# Patient Record
Sex: FEMALE | Race: WHITE
[De-identification: names, ages, dates, MRNs, and addresses within clinical notes are randomized per-mention and may not be internally consistent; named-entity substitution may affect disease eponyms.]

---

## 2017-01-25 ENCOUNTER — APPOINTMENT (OUTPATIENT)
Dept: COLORECTAL SURGERY | Facility: CLINIC | Age: 52
End: 2017-01-25

## 2017-01-25 VITALS
BODY MASS INDEX: 28.89 KG/M2 | TEMPERATURE: 97.9 F | SYSTOLIC BLOOD PRESSURE: 118 MMHG | HEART RATE: 86 BPM | WEIGHT: 157 LBS | DIASTOLIC BLOOD PRESSURE: 80 MMHG | HEIGHT: 62 IN

## 2017-01-25 DIAGNOSIS — R15.9 FULL INCONTINENCE OF FECES: ICD-10-CM

## 2017-01-25 DIAGNOSIS — Z71.9 COUNSELING, UNSPECIFIED: ICD-10-CM

## 2017-01-26 ENCOUNTER — OTHER (OUTPATIENT)
Age: 52
End: 2017-01-26

## 2017-01-26 PROBLEM — R15.9 FECAL INCONTINENCE: Status: ACTIVE | Noted: 2017-01-26

## 2017-01-26 PROBLEM — Z71.9 REASON FOR CONSULTATION: Status: ACTIVE | Noted: 2017-01-25

## 2017-02-01 ENCOUNTER — OUTPATIENT (OUTPATIENT)
Dept: OUTPATIENT SERVICES | Facility: HOSPITAL | Age: 52
LOS: 1 days | Discharge: HOME | End: 2017-02-01
Payer: COMMERCIAL

## 2017-02-01 ENCOUNTER — OTHER (OUTPATIENT)
Age: 52
End: 2017-02-01

## 2017-02-01 VITALS
SYSTOLIC BLOOD PRESSURE: 110 MMHG | RESPIRATION RATE: 18 BRPM | HEART RATE: 88 BPM | HEIGHT: 62 IN | WEIGHT: 197.98 LBS | OXYGEN SATURATION: 100 % | TEMPERATURE: 98 F | DIASTOLIC BLOOD PRESSURE: 72 MMHG

## 2017-02-01 DIAGNOSIS — Q78.0 OSTEOGENESIS IMPERFECTA: Chronic | ICD-10-CM

## 2017-02-01 DIAGNOSIS — S96.812A STRAIN OF OTHER SPECIFIED MUSCLES AND TENDONS AT ANKLE AND FOOT LEVEL, LEFT FOOT, INITIAL ENCOUNTER: Chronic | ICD-10-CM

## 2017-02-01 DIAGNOSIS — R15.9 FULL INCONTINENCE OF FECES: ICD-10-CM

## 2017-02-01 DIAGNOSIS — Z98.890 OTHER SPECIFIED POSTPROCEDURAL STATES: Chronic | ICD-10-CM

## 2017-02-01 LAB
ANION GAP SERPL CALC-SCNC: 8 MMOL/L — SIGNIFICANT CHANGE UP (ref 5–17)
APTT BLD: 33.1 SEC — SIGNIFICANT CHANGE UP (ref 27.5–37.4)
BASOPHILS # BLD AUTO: 0.1 K/UL — SIGNIFICANT CHANGE UP (ref 0–0.2)
BASOPHILS NFR BLD AUTO: 1.3 % — SIGNIFICANT CHANGE UP (ref 0–2)
BUN SERPL-MCNC: 9 MG/DL — SIGNIFICANT CHANGE UP (ref 7–23)
CALCIUM SERPL-MCNC: 9.3 MG/DL — SIGNIFICANT CHANGE UP (ref 8.5–10.1)
CHLORIDE SERPL-SCNC: 103 MMOL/L — SIGNIFICANT CHANGE UP (ref 96–108)
CO2 SERPL-SCNC: 29 MMOL/L — SIGNIFICANT CHANGE UP (ref 22–31)
CREAT SERPL-MCNC: 0.87 MG/DL — SIGNIFICANT CHANGE UP (ref 0.5–1.3)
EOSINOPHIL # BLD AUTO: 0.1 K/UL — SIGNIFICANT CHANGE UP (ref 0–0.5)
EOSINOPHIL NFR BLD AUTO: 2.1 % — SIGNIFICANT CHANGE UP (ref 0–6)
GLUCOSE SERPL-MCNC: 105 MG/DL — HIGH (ref 70–99)
HCT VFR BLD CALC: 41.6 % — SIGNIFICANT CHANGE UP (ref 34.5–45)
HGB BLD-MCNC: 13.9 G/DL — SIGNIFICANT CHANGE UP (ref 11.5–15.5)
INR BLD: 1.04 RATIO — SIGNIFICANT CHANGE UP (ref 0.88–1.16)
LYMPHOCYTES # BLD AUTO: 3.1 K/UL — SIGNIFICANT CHANGE UP (ref 1–3.3)
LYMPHOCYTES # BLD AUTO: 51.1 % — HIGH (ref 13–44)
MCHC RBC-ENTMCNC: 29 PG — SIGNIFICANT CHANGE UP (ref 27–34)
MCHC RBC-ENTMCNC: 33.3 GM/DL — SIGNIFICANT CHANGE UP (ref 32–36)
MCV RBC AUTO: 87.3 FL — SIGNIFICANT CHANGE UP (ref 80–100)
MONOCYTES # BLD AUTO: 0.4 K/UL — SIGNIFICANT CHANGE UP (ref 0–0.9)
MONOCYTES NFR BLD AUTO: 6.5 % — SIGNIFICANT CHANGE UP (ref 2–14)
NEUTROPHILS # BLD AUTO: 2.4 K/UL — SIGNIFICANT CHANGE UP (ref 1.8–7.4)
NEUTROPHILS NFR BLD AUTO: 39 % — LOW (ref 43–77)
PLATELET # BLD AUTO: 223 K/UL — SIGNIFICANT CHANGE UP (ref 150–400)
POTASSIUM SERPL-MCNC: 3.2 MMOL/L — LOW (ref 3.5–5.3)
POTASSIUM SERPL-SCNC: 3.2 MMOL/L — LOW (ref 3.5–5.3)
PROTHROM AB SERPL-ACNC: 11.5 SEC — SIGNIFICANT CHANGE UP (ref 10–13.1)
RBC # BLD: 4.77 M/UL — SIGNIFICANT CHANGE UP (ref 3.8–5.2)
RBC # FLD: 11.3 % — SIGNIFICANT CHANGE UP (ref 10.3–14.5)
SODIUM SERPL-SCNC: 140 MMOL/L — SIGNIFICANT CHANGE UP (ref 135–145)
WBC # BLD: 6 K/UL — SIGNIFICANT CHANGE UP (ref 3.8–10.5)
WBC # FLD AUTO: 6 K/UL — SIGNIFICANT CHANGE UP (ref 3.8–10.5)

## 2017-02-01 PROCEDURE — 93010 ELECTROCARDIOGRAM REPORT: CPT

## 2017-02-01 NOTE — H&P PST ADULT - HISTORY OF PRESENT ILLNESS
51 years old female with  "damaged" sacral nerves. Patient had difficulty with bowel movements. She had a sacral nerve stimulator placed January , 2015. She admitted to improvement in bowel movements "for a couple of months". "And then it stopped working. Last bowel movement 1/20/ 2017. Admits to lower abdominal cramping. Fecal matter "in my vagina" at present.

## 2017-02-01 NOTE — H&P PST ADULT - PMH
Acid reflux    Ahn esophagus    Depression    Endometriosis  Hysterectomy  Gall bladder stones  History of. Gall bladder removed  Hemorrhoids, unspecified hemorrhoid type    Hepatic adenoma  History of. 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

## 2017-02-01 NOTE — H&P PST ADULT - ASSESSMENT
51 years old female present to PST prior to removal of sacral nerve stimulator. medical clearance Dr. Rodriguez. Cardiac clearance Dr. Kim.

## 2017-02-01 NOTE — H&P PST ADULT - FAMILY HISTORY
Father  Still living? No  Family history of heart attack, Age at diagnosis: Age Unknown  Family history of hypertension, Age at diagnosis: Age Unknown     Mother  Still living? No  Family history of hypertension, Age at diagnosis: Age Unknown  Family history of uterine cancer, Age at diagnosis: Age Unknown

## 2017-02-01 NOTE — H&P PST ADULT - PSH
Aplasia ossea microplastica    Hepatic adenoma  resected 2006  S/P appendectomy  age 14  S/P breast augmentation  reduction 2003  S/P exploratory laparotomy  12/12 cholecystectomy, ROZINA, endometriosis  S/P hysterectomy with oophorectomy  with mesh for stress incontinence 3/12  S/P LASIK surgery of both eyes  2000  S/P Nissen fundoplication (with gastrostomy tube placement)  for barrets esophagus 2006  S/P tonsillectomy  age 14  Stress incontinence, female  right side of mesh "cut" adjusted 9/12  Tendon rupture, post-op  repaired right elbow 2011  Traumatic rupture of plantar fascia of left foot, initial encounter  surgery

## 2017-02-01 NOTE — H&P PST ADULT - NEGATIVE FEMALE-SPECIFIC SYMPTOMS
no menorrhagia/no abnormal vaginal bleeding/no spotting/no amenorrhea/no dysmenorrhea/no pelvic pain/no irregular menses

## 2017-02-01 NOTE — H&P PST ADULT - TEACHING/LEARNING LEARNING PREFERENCES
video/skill demonstration/written material/computer/internet/individual instruction/verbal instruction/pictorial/group instruction/audio

## 2017-02-06 ENCOUNTER — APPOINTMENT (OUTPATIENT)
Dept: COLORECTAL SURGERY | Facility: HOSPITAL | Age: 52
End: 2017-02-06

## 2017-02-06 ENCOUNTER — OUTPATIENT (OUTPATIENT)
Dept: OUTPATIENT SERVICES | Facility: HOSPITAL | Age: 52
LOS: 1 days | Discharge: HOME | End: 2017-02-06
Payer: COMMERCIAL

## 2017-02-06 VITALS
HEART RATE: 83 BPM | TEMPERATURE: 98 F | SYSTOLIC BLOOD PRESSURE: 101 MMHG | RESPIRATION RATE: 16 BRPM | OXYGEN SATURATION: 95 % | DIASTOLIC BLOOD PRESSURE: 61 MMHG

## 2017-02-06 VITALS
HEART RATE: 84 BPM | SYSTOLIC BLOOD PRESSURE: 113 MMHG | TEMPERATURE: 98 F | DIASTOLIC BLOOD PRESSURE: 56 MMHG | OXYGEN SATURATION: 97 % | RESPIRATION RATE: 16 BRPM | WEIGHT: 198.42 LBS | HEIGHT: 62 IN

## 2017-02-06 DIAGNOSIS — Z98.890 OTHER SPECIFIED POSTPROCEDURAL STATES: Chronic | ICD-10-CM

## 2017-02-06 DIAGNOSIS — S96.812A STRAIN OF OTHER SPECIFIED MUSCLES AND TENDONS AT ANKLE AND FOOT LEVEL, LEFT FOOT, INITIAL ENCOUNTER: Chronic | ICD-10-CM

## 2017-02-06 DIAGNOSIS — Q78.0 OSTEOGENESIS IMPERFECTA: Chronic | ICD-10-CM

## 2017-02-06 PROCEDURE — 64585 REV/RMV PERPH NSTIM ELTRD RA: CPT

## 2017-02-06 PROCEDURE — 64595 REV/RMV PRPH SAC/GSTR NPG/R: CPT

## 2017-02-06 RX ORDER — FAMOTIDINE 10 MG/ML
20 INJECTION INTRAVENOUS ONCE
Qty: 0 | Refills: 0 | Status: COMPLETED | OUTPATIENT
Start: 2017-02-06 | End: 2017-02-06

## 2017-02-06 RX ORDER — SODIUM CHLORIDE 9 MG/ML
3 INJECTION INTRAMUSCULAR; INTRAVENOUS; SUBCUTANEOUS EVERY 8 HOURS
Qty: 0 | Refills: 0 | Status: DISCONTINUED | OUTPATIENT
Start: 2017-02-06 | End: 2017-02-06

## 2017-02-06 RX ORDER — OXYCODONE HYDROCHLORIDE 5 MG/1
10 TABLET ORAL ONCE
Qty: 0 | Refills: 0 | Status: DISCONTINUED | OUTPATIENT
Start: 2017-02-06 | End: 2017-02-06

## 2017-02-06 RX ORDER — FUROSEMIDE 40 MG
1 TABLET ORAL
Qty: 0 | Refills: 0 | COMMUNITY

## 2017-02-06 RX ORDER — CELECOXIB 200 MG/1
200 CAPSULE ORAL ONCE
Qty: 0 | Refills: 0 | Status: COMPLETED | OUTPATIENT
Start: 2017-02-06 | End: 2017-02-06

## 2017-02-06 RX ORDER — SODIUM CHLORIDE 9 MG/ML
1000 INJECTION, SOLUTION INTRAVENOUS
Qty: 0 | Refills: 0 | Status: DISCONTINUED | OUTPATIENT
Start: 2017-02-06 | End: 2017-02-21

## 2017-02-06 RX ORDER — FENTANYL CITRATE 50 UG/ML
50 INJECTION INTRAVENOUS
Qty: 0 | Refills: 0 | Status: DISCONTINUED | OUTPATIENT
Start: 2017-02-06 | End: 2017-02-06

## 2017-02-06 RX ORDER — SODIUM CHLORIDE 9 MG/ML
1000 INJECTION, SOLUTION INTRAVENOUS
Qty: 0 | Refills: 0 | Status: DISCONTINUED | OUTPATIENT
Start: 2017-02-06 | End: 2017-02-06

## 2017-02-06 RX ORDER — ONDANSETRON 8 MG/1
4 TABLET, FILM COATED ORAL ONCE
Qty: 0 | Refills: 0 | Status: DISCONTINUED | OUTPATIENT
Start: 2017-02-06 | End: 2017-02-06

## 2017-02-06 RX ORDER — ACETAMINOPHEN 500 MG
975 TABLET ORAL ONCE
Qty: 0 | Refills: 0 | Status: COMPLETED | OUTPATIENT
Start: 2017-02-06 | End: 2017-02-06

## 2017-02-06 RX ADMIN — SODIUM CHLORIDE 75 MILLILITER(S): 9 INJECTION, SOLUTION INTRAVENOUS at 10:00

## 2017-02-06 RX ADMIN — Medication 975 MILLIGRAM(S): at 07:16

## 2017-02-06 RX ADMIN — FAMOTIDINE 20 MILLIGRAM(S): 10 INJECTION INTRAVENOUS at 07:16

## 2017-02-06 RX ADMIN — OXYCODONE HYDROCHLORIDE 10 MILLIGRAM(S): 5 TABLET ORAL at 07:16

## 2017-02-06 NOTE — ASU DISCHARGE PLAN (ADULT/PEDIATRIC). - NURSING INSTRUCTIONS
For any problems or concerns,contact your doctor. [facility] patients should call the [facility]. If you cannot reach the doctor or clinic, call [facility] Emergency Department at [phone] or go to your local Emergency Department.  A responsible adult should be with you for the rest of the day and night for your safety and to help you if you needed. Resume your medications as listed on the attached Medication Record.

## 2017-02-06 NOTE — ASU DISCHARGE PLAN (ADULT/PEDIATRIC). - MEDICATION SUMMARY - MEDICATIONS TO TAKE
I will START or STAY ON the medications listed below when I get home from the hospital:    oxyCODONE 10 mg oral tablet  -- 10 milligram(s) by mouth 3 times a day  -- Indication: For home med    Valium 5 mg oral tablet  -- 5 milligram(s) by mouth 2 times a day  -- Indication: For home med    Sarafem 20 mg oral capsule  -- 1 cap(s) by mouth once a day (in the morning)  -- Indication: For home med    Elavil 10 mg oral tablet  -- 2 tab(s) by mouth once a day (at bedtime)  -- Indication: For home med    Bystolic 10 mg oral tablet  -- 1 tab(s) by mouth once a day (in the morning)  -- Indication: For home med    Movantik 25 mg oral tablet  -- 1 tab(s) by mouth once a day (in the morning)  -- Indication: For home med    Vitamin D2 50,000 intl units (1.25 mg) oral capsule  -- 1 cap(s) by mouth once a week  -- taken on Sundays  -- Indication: For home med

## 2017-02-08 DIAGNOSIS — Z46.2 ENCOUNTER FOR FITTING AND ADJUSTMENT OF OTHER DEVICES RELATED TO NERVOUS SYSTEM AND SPECIAL SENSES: ICD-10-CM

## 2017-02-08 DIAGNOSIS — R15.9 FULL INCONTINENCE OF FECES: ICD-10-CM

## 2017-02-08 DIAGNOSIS — Z88.2 ALLERGY STATUS TO SULFONAMIDES: ICD-10-CM

## 2017-02-08 DIAGNOSIS — E78.5 HYPERLIPIDEMIA, UNSPECIFIED: ICD-10-CM

## 2017-02-08 DIAGNOSIS — K57.30 DIVERTICULOSIS OF LARGE INTESTINE WITHOUT PERFORATION OR ABSCESS WITHOUT BLEEDING: ICD-10-CM

## 2017-02-08 DIAGNOSIS — K21.9 GASTRO-ESOPHAGEAL REFLUX DISEASE WITHOUT ESOPHAGITIS: ICD-10-CM

## 2017-02-08 DIAGNOSIS — K58.9 IRRITABLE BOWEL SYNDROME WITHOUT DIARRHEA: ICD-10-CM

## 2017-02-08 DIAGNOSIS — I10 ESSENTIAL (PRIMARY) HYPERTENSION: ICD-10-CM

## 2017-02-14 ENCOUNTER — APPOINTMENT (OUTPATIENT)
Dept: COLORECTAL SURGERY | Facility: CLINIC | Age: 52
End: 2017-02-14

## 2017-02-17 ENCOUNTER — APPOINTMENT (OUTPATIENT)
Dept: COLORECTAL SURGERY | Facility: CLINIC | Age: 52
End: 2017-02-17

## 2017-02-17 VITALS
SYSTOLIC BLOOD PRESSURE: 136 MMHG | HEIGHT: 62 IN | WEIGHT: 157 LBS | DIASTOLIC BLOOD PRESSURE: 90 MMHG | HEART RATE: 82 BPM | TEMPERATURE: 98.1 F | BODY MASS INDEX: 28.89 KG/M2

## 2017-02-17 RX ORDER — ERGOCALCIFEROL 1.25 MG/1
1.25 MG CAPSULE, LIQUID FILLED ORAL
Qty: 4 | Refills: 0 | Status: ACTIVE | COMMUNITY
Start: 2016-10-18

## 2017-02-20 ENCOUNTER — OTHER (OUTPATIENT)
Age: 52
End: 2017-02-20

## 2017-02-21 ENCOUNTER — OTHER (OUTPATIENT)
Age: 52
End: 2017-02-21

## 2017-03-07 ENCOUNTER — FORM ENCOUNTER (OUTPATIENT)
Age: 52
End: 2017-03-07

## 2017-03-08 ENCOUNTER — APPOINTMENT (OUTPATIENT)
Dept: MRI IMAGING | Facility: CLINIC | Age: 52
End: 2017-03-08

## 2017-03-08 ENCOUNTER — OUTPATIENT (OUTPATIENT)
Dept: OUTPATIENT SERVICES | Facility: HOSPITAL | Age: 52
LOS: 1 days | End: 2017-03-08
Payer: COMMERCIAL

## 2017-03-08 DIAGNOSIS — Q78.0 OSTEOGENESIS IMPERFECTA: Chronic | ICD-10-CM

## 2017-03-08 DIAGNOSIS — Z00.8 ENCOUNTER FOR OTHER GENERAL EXAMINATION: ICD-10-CM

## 2017-03-08 DIAGNOSIS — S96.812A STRAIN OF OTHER SPECIFIED MUSCLES AND TENDONS AT ANKLE AND FOOT LEVEL, LEFT FOOT, INITIAL ENCOUNTER: Chronic | ICD-10-CM

## 2017-03-08 DIAGNOSIS — Z98.890 OTHER SPECIFIED POSTPROCEDURAL STATES: Chronic | ICD-10-CM

## 2017-03-08 PROCEDURE — 82565 ASSAY OF CREATININE: CPT

## 2017-03-08 PROCEDURE — A9585: CPT

## 2017-03-08 PROCEDURE — 72197 MRI PELVIS W/O & W/DYE: CPT

## 2017-03-21 ENCOUNTER — APPOINTMENT (OUTPATIENT)
Dept: COLORECTAL SURGERY | Facility: CLINIC | Age: 52
End: 2017-03-21

## 2017-03-22 VITALS
BODY MASS INDEX: 28.89 KG/M2 | RESPIRATION RATE: 14 BRPM | WEIGHT: 157 LBS | DIASTOLIC BLOOD PRESSURE: 86 MMHG | HEIGHT: 62 IN | HEART RATE: 78 BPM | TEMPERATURE: 98 F | SYSTOLIC BLOOD PRESSURE: 128 MMHG

## 2017-03-30 ENCOUNTER — APPOINTMENT (OUTPATIENT)
Dept: UROLOGY | Facility: CLINIC | Age: 52
End: 2017-03-30

## 2017-03-30 DIAGNOSIS — N39.3 STRESS INCONTINENCE (FEMALE) (MALE): ICD-10-CM

## 2017-03-31 ENCOUNTER — APPOINTMENT (OUTPATIENT)
Dept: COLORECTAL SURGERY | Facility: CLINIC | Age: 52
End: 2017-03-31

## 2017-03-31 VITALS
BODY MASS INDEX: 28.89 KG/M2 | TEMPERATURE: 98.1 F | WEIGHT: 157 LBS | SYSTOLIC BLOOD PRESSURE: 129 MMHG | DIASTOLIC BLOOD PRESSURE: 82 MMHG | HEIGHT: 62 IN | HEART RATE: 69 BPM

## 2017-03-31 DIAGNOSIS — N32.1 VESICOINTESTINAL FISTULA: ICD-10-CM

## 2017-04-11 PROBLEM — N32.1 COLOVESICAL FISTULA: Status: ACTIVE | Noted: 2017-02-17

## 2017-04-14 ENCOUNTER — OUTPATIENT (OUTPATIENT)
Dept: OUTPATIENT SERVICES | Facility: HOSPITAL | Age: 52
LOS: 1 days | Discharge: HOME | End: 2017-04-14
Payer: COMMERCIAL

## 2017-04-14 VITALS
OXYGEN SATURATION: 97 % | RESPIRATION RATE: 14 BRPM | DIASTOLIC BLOOD PRESSURE: 68 MMHG | HEIGHT: 62.5 IN | HEART RATE: 70 BPM | TEMPERATURE: 98 F | SYSTOLIC BLOOD PRESSURE: 111 MMHG | WEIGHT: 194.01 LBS

## 2017-04-14 DIAGNOSIS — Z98.890 OTHER SPECIFIED POSTPROCEDURAL STATES: Chronic | ICD-10-CM

## 2017-04-14 DIAGNOSIS — N73.6 FEMALE PELVIC PERITONEAL ADHESIONS (POSTINFECTIVE): ICD-10-CM

## 2017-04-14 DIAGNOSIS — S96.812A STRAIN OF OTHER SPECIFIED MUSCLES AND TENDONS AT ANKLE AND FOOT LEVEL, LEFT FOOT, INITIAL ENCOUNTER: Chronic | ICD-10-CM

## 2017-04-14 DIAGNOSIS — N82.4 OTHER FEMALE INTESTINAL-GENITAL TRACT FISTULAE: ICD-10-CM

## 2017-04-14 DIAGNOSIS — N32.1 VESICOINTESTINAL FISTULA: ICD-10-CM

## 2017-04-14 DIAGNOSIS — Q78.0 OSTEOGENESIS IMPERFECTA: Chronic | ICD-10-CM

## 2017-04-14 LAB
ALBUMIN SERPL ELPH-MCNC: 4.3 G/DL — SIGNIFICANT CHANGE UP (ref 3.3–5)
ALP SERPL-CCNC: 107 U/L — SIGNIFICANT CHANGE UP (ref 40–120)
ALT FLD-CCNC: 36 U/L — SIGNIFICANT CHANGE UP (ref 12–78)
ANION GAP SERPL CALC-SCNC: 6 MMOL/L — SIGNIFICANT CHANGE UP (ref 5–17)
APPEARANCE UR: CLEAR — SIGNIFICANT CHANGE UP
APTT BLD: 34.2 SEC — SIGNIFICANT CHANGE UP (ref 27.5–37.4)
AST SERPL-CCNC: 24 U/L — SIGNIFICANT CHANGE UP (ref 15–37)
BASOPHILS # BLD AUTO: 0.1 K/UL — SIGNIFICANT CHANGE UP (ref 0–0.2)
BASOPHILS NFR BLD AUTO: 1.6 % — SIGNIFICANT CHANGE UP (ref 0–2)
BILIRUB DIRECT SERPL-MCNC: <0.1 MG/DL — SIGNIFICANT CHANGE UP (ref 0–0.2)
BILIRUB SERPL-MCNC: 0.5 MG/DL — SIGNIFICANT CHANGE UP (ref 0.2–1.2)
BILIRUB UR-MCNC: NEGATIVE — SIGNIFICANT CHANGE UP
BUN SERPL-MCNC: 16 MG/DL — SIGNIFICANT CHANGE UP (ref 7–23)
CALCIUM SERPL-MCNC: 9.1 MG/DL — SIGNIFICANT CHANGE UP (ref 8.5–10.1)
CHLORIDE SERPL-SCNC: 105 MMOL/L — SIGNIFICANT CHANGE UP (ref 96–108)
CO2 SERPL-SCNC: 30 MMOL/L — SIGNIFICANT CHANGE UP (ref 22–31)
COLOR SPEC: YELLOW — SIGNIFICANT CHANGE UP
CREAT SERPL-MCNC: 0.96 MG/DL — SIGNIFICANT CHANGE UP (ref 0.5–1.3)
DIFF PNL FLD: (no result)
EOSINOPHIL # BLD AUTO: 0.1 K/UL — SIGNIFICANT CHANGE UP (ref 0–0.5)
EOSINOPHIL NFR BLD AUTO: 1.6 % — SIGNIFICANT CHANGE UP (ref 0–6)
GLUCOSE SERPL-MCNC: 103 MG/DL — HIGH (ref 70–99)
GLUCOSE UR QL: NEGATIVE MG/DL — SIGNIFICANT CHANGE UP
HBA1C BLD-MCNC: 5.6 % — SIGNIFICANT CHANGE UP (ref 4–5.6)
HCT VFR BLD CALC: 43 % — SIGNIFICANT CHANGE UP (ref 34.5–45)
HGB BLD-MCNC: 14.9 G/DL — SIGNIFICANT CHANGE UP (ref 11.5–15.5)
INR BLD: 1.02 RATIO — SIGNIFICANT CHANGE UP (ref 0.88–1.16)
KETONES UR-MCNC: (no result)
LEUKOCYTE ESTERASE UR-ACNC: (no result)
LYMPHOCYTES # BLD AUTO: 2 K/UL — SIGNIFICANT CHANGE UP (ref 1–3.3)
LYMPHOCYTES # BLD AUTO: 33.1 % — SIGNIFICANT CHANGE UP (ref 13–44)
MCHC RBC-ENTMCNC: 30.8 PG — SIGNIFICANT CHANGE UP (ref 27–34)
MCHC RBC-ENTMCNC: 34.6 GM/DL — SIGNIFICANT CHANGE UP (ref 32–36)
MCV RBC AUTO: 88.9 FL — SIGNIFICANT CHANGE UP (ref 80–100)
MONOCYTES # BLD AUTO: 0.4 K/UL — SIGNIFICANT CHANGE UP (ref 0–0.9)
MONOCYTES NFR BLD AUTO: 7.3 % — SIGNIFICANT CHANGE UP (ref 2–14)
MRSA PCR RESULT.: SIGNIFICANT CHANGE UP
NEUTROPHILS # BLD AUTO: 3.4 K/UL — SIGNIFICANT CHANGE UP (ref 1.8–7.4)
NEUTROPHILS NFR BLD AUTO: 56.5 % — SIGNIFICANT CHANGE UP (ref 43–77)
NITRITE UR-MCNC: NEGATIVE — SIGNIFICANT CHANGE UP
PH UR: 5 — SIGNIFICANT CHANGE UP (ref 4.8–8)
PLATELET # BLD AUTO: 196 K/UL — SIGNIFICANT CHANGE UP (ref 150–400)
POTASSIUM SERPL-MCNC: 4.5 MMOL/L — SIGNIFICANT CHANGE UP (ref 3.5–5.3)
POTASSIUM SERPL-SCNC: 4.5 MMOL/L — SIGNIFICANT CHANGE UP (ref 3.5–5.3)
PROT SERPL-MCNC: 7.6 GM/DL — SIGNIFICANT CHANGE UP (ref 6–8.3)
PROT UR-MCNC: 15 MG/DL
PROTHROM AB SERPL-ACNC: 11 SEC — SIGNIFICANT CHANGE UP (ref 9.8–12.7)
RBC # BLD: 4.84 M/UL — SIGNIFICANT CHANGE UP (ref 3.8–5.2)
RBC # FLD: 12.3 % — SIGNIFICANT CHANGE UP (ref 10.3–14.5)
S AUREUS DNA NOSE QL NAA+PROBE: SIGNIFICANT CHANGE UP
SODIUM SERPL-SCNC: 141 MMOL/L — SIGNIFICANT CHANGE UP (ref 135–145)
SP GR SPEC: 1.02 — SIGNIFICANT CHANGE UP (ref 1.01–1.02)
TYPE + AB SCN PNL BLD: SIGNIFICANT CHANGE UP
UROBILINOGEN FLD QL: NEGATIVE MG/DL — SIGNIFICANT CHANGE UP
WBC # BLD: 6 K/UL — SIGNIFICANT CHANGE UP (ref 3.8–10.5)
WBC # FLD AUTO: 6 K/UL — SIGNIFICANT CHANGE UP (ref 3.8–10.5)

## 2017-04-14 PROCEDURE — 93010 ELECTROCARDIOGRAM REPORT: CPT

## 2017-04-14 RX ORDER — DIAZEPAM 5 MG
5 TABLET ORAL
Qty: 0 | Refills: 0 | COMMUNITY

## 2017-04-14 NOTE — H&P PST ADULT - PMH
Acid reflux    Ahn esophagus    Chronic UTI    Constipation    Endometriosis  Hysterectomy  Hemorrhoids, unspecified hemorrhoid type    Hepatic adenoma  History of. 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

## 2017-04-14 NOTE — H&P PST ADULT - HISTORY OF PRESENT ILLNESS
50yo female presents to PST prior to proposed procedure. Reports multiple  & rectal procedures with mesh. c/o stool & air "coming out of the vagina for at least 6 months. Reports chronic UTIs. Was referred to Dr Loomis by family member. Reports having MRI & it showed "diverticulum of the urethrum". Reports blood in stool. Denies fevers.   Now for said procedure.

## 2017-04-14 NOTE — H&P PST ADULT - GENITOURINARY COMMENTS
Followed by urologist for chronic UTI managed with oral antibiotics. Followed by pain management for pudendal nerve pain & pelvic floor dysfunction.

## 2017-04-14 NOTE — H&P PST ADULT - PSH
Aplasia ossea microplastica    Hepatic adenoma  resected 2006  History of rectal surgery  interstimulator for rectal incontinence  later removed 2016  S/P appendectomy  1979  S/P breast augmentation  reduction 2003  S/P colonoscopy  multiple  S/P exploratory laparotomy  cholecystectomy, ROZINA, endometriosis  S/P hysterectomy with oophorectomy  with mesh for stress incontinence  S/P LASIK surgery of both eyes  2000  S/P Nissen fundoplication (with gastrostomy tube placement)  for barrets esophagus 2006  S/P tonsillectomy  1979  Stress incontinence, female  right side of mesh "cut" adjusted  Tendon rupture, post-op  repaired right elbow 2011  Traumatic rupture of plantar fascia of left foot, initial encounter  surgery

## 2017-04-14 NOTE — H&P PST ADULT - ASSESSMENT
52 yo female scheduled for rectovaginal fistula, colon resection & related procedure with Dr Loomis on 4/24/17.     1. Labs as per surgeon  2. EKG  3. Medical clearance with PCP Dr Madhuri Rodriguez  4. discussed EZ sponges & day of procedure instructions

## 2017-04-18 RX ORDER — ALVIMOPAN 12 MG/1
12 CAPSULE ORAL ONCE
Qty: 0 | Refills: 0 | Status: COMPLETED | OUTPATIENT
Start: 2017-04-24 | End: 2017-04-24

## 2017-04-18 RX ORDER — SODIUM CHLORIDE 9 MG/ML
3 INJECTION INTRAMUSCULAR; INTRAVENOUS; SUBCUTANEOUS EVERY 8 HOURS
Qty: 0 | Refills: 0 | Status: DISCONTINUED | OUTPATIENT
Start: 2017-04-24 | End: 2017-04-25

## 2017-04-21 ENCOUNTER — OTHER (OUTPATIENT)
Age: 52
End: 2017-04-21

## 2017-04-21 RX ORDER — FAMOTIDINE 10 MG/ML
20 INJECTION INTRAVENOUS ONCE
Qty: 0 | Refills: 0 | Status: COMPLETED | OUTPATIENT
Start: 2017-04-24 | End: 2017-04-24

## 2017-04-21 RX ORDER — SODIUM CHLORIDE 9 MG/ML
3 INJECTION INTRAMUSCULAR; INTRAVENOUS; SUBCUTANEOUS EVERY 8 HOURS
Qty: 0 | Refills: 0 | Status: DISCONTINUED | OUTPATIENT
Start: 2017-04-24 | End: 2017-04-25

## 2017-04-21 RX ORDER — OXYCODONE HYDROCHLORIDE 5 MG/1
10 TABLET ORAL ONCE
Qty: 0 | Refills: 0 | Status: DISCONTINUED | OUTPATIENT
Start: 2017-04-24 | End: 2017-04-24

## 2017-04-21 RX ORDER — ACETAMINOPHEN 500 MG
975 TABLET ORAL ONCE
Qty: 0 | Refills: 0 | Status: COMPLETED | OUTPATIENT
Start: 2017-04-24 | End: 2017-04-24

## 2017-04-23 ENCOUNTER — RESULT REVIEW (OUTPATIENT)
Age: 52
End: 2017-04-23

## 2017-04-24 ENCOUNTER — INPATIENT (INPATIENT)
Facility: HOSPITAL | Age: 52
LOS: 0 days | Discharge: HOME | End: 2017-04-25
Attending: COLON & RECTAL SURGERY | Admitting: COLON & RECTAL SURGERY
Payer: COMMERCIAL

## 2017-04-24 ENCOUNTER — APPOINTMENT (OUTPATIENT)
Dept: COLORECTAL SURGERY | Facility: HOSPITAL | Age: 52
End: 2017-04-24

## 2017-04-24 VITALS
WEIGHT: 156.97 LBS | HEIGHT: 62 IN | HEART RATE: 72 BPM | OXYGEN SATURATION: 95 % | RESPIRATION RATE: 16 BRPM | DIASTOLIC BLOOD PRESSURE: 67 MMHG | TEMPERATURE: 98 F | SYSTOLIC BLOOD PRESSURE: 131 MMHG

## 2017-04-24 DIAGNOSIS — Z98.890 OTHER SPECIFIED POSTPROCEDURAL STATES: Chronic | ICD-10-CM

## 2017-04-24 DIAGNOSIS — S96.812A STRAIN OF OTHER SPECIFIED MUSCLES AND TENDONS AT ANKLE AND FOOT LEVEL, LEFT FOOT, INITIAL ENCOUNTER: Chronic | ICD-10-CM

## 2017-04-24 DIAGNOSIS — Q78.0 OSTEOGENESIS IMPERFECTA: Chronic | ICD-10-CM

## 2017-04-24 LAB
ANION GAP SERPL CALC-SCNC: 11 MMOL/L — SIGNIFICANT CHANGE UP (ref 5–17)
BASOPHILS # BLD AUTO: 0 K/UL — SIGNIFICANT CHANGE UP (ref 0–0.2)
BASOPHILS NFR BLD AUTO: 0.1 % — SIGNIFICANT CHANGE UP (ref 0–2)
BUN SERPL-MCNC: 12 MG/DL — SIGNIFICANT CHANGE UP (ref 7–23)
CALCIUM SERPL-MCNC: 8 MG/DL — LOW (ref 8.5–10.1)
CHLORIDE SERPL-SCNC: 110 MMOL/L — HIGH (ref 96–108)
CO2 SERPL-SCNC: 20 MMOL/L — LOW (ref 22–31)
CREAT SERPL-MCNC: 1.28 MG/DL — SIGNIFICANT CHANGE UP (ref 0.5–1.3)
EOSINOPHIL # BLD AUTO: 0 K/UL — SIGNIFICANT CHANGE UP (ref 0–0.5)
EOSINOPHIL NFR BLD AUTO: 0 % — SIGNIFICANT CHANGE UP (ref 0–6)
GLUCOSE SERPL-MCNC: 164 MG/DL — HIGH (ref 70–99)
HCT VFR BLD CALC: 41.9 % — SIGNIFICANT CHANGE UP (ref 34.5–45)
HGB BLD-MCNC: 13.5 G/DL — SIGNIFICANT CHANGE UP (ref 11.5–15.5)
LYMPHOCYTES # BLD AUTO: 0.9 K/UL — LOW (ref 1–3.3)
LYMPHOCYTES # BLD AUTO: 9.4 % — LOW (ref 13–44)
MCHC RBC-ENTMCNC: 28.8 PG — SIGNIFICANT CHANGE UP (ref 27–34)
MCHC RBC-ENTMCNC: 32.3 GM/DL — SIGNIFICANT CHANGE UP (ref 32–36)
MCV RBC AUTO: 89.3 FL — SIGNIFICANT CHANGE UP (ref 80–100)
MONOCYTES # BLD AUTO: 0.1 K/UL — SIGNIFICANT CHANGE UP (ref 0–0.9)
MONOCYTES NFR BLD AUTO: 0.6 % — LOW (ref 2–14)
NEUTROPHILS # BLD AUTO: 8.5 K/UL — HIGH (ref 1.8–7.4)
NEUTROPHILS NFR BLD AUTO: 89.8 % — HIGH (ref 43–77)
PLATELET # BLD AUTO: 167 K/UL — SIGNIFICANT CHANGE UP (ref 150–400)
POTASSIUM SERPL-MCNC: 3.7 MMOL/L — SIGNIFICANT CHANGE UP (ref 3.5–5.3)
POTASSIUM SERPL-SCNC: 3.7 MMOL/L — SIGNIFICANT CHANGE UP (ref 3.5–5.3)
RBC # BLD: 4.69 M/UL — SIGNIFICANT CHANGE UP (ref 3.8–5.2)
RBC # FLD: 12.3 % — SIGNIFICANT CHANGE UP (ref 10.3–14.5)
SODIUM SERPL-SCNC: 141 MMOL/L — SIGNIFICANT CHANGE UP (ref 135–145)
WBC # BLD: 9.5 K/UL — SIGNIFICANT CHANGE UP (ref 3.8–10.5)
WBC # FLD AUTO: 9.5 K/UL — SIGNIFICANT CHANGE UP (ref 3.8–10.5)

## 2017-04-24 PROCEDURE — 49321 LAPAROSCOPY BIOPSY: CPT | Mod: AS

## 2017-04-24 PROCEDURE — 58662 LAPAROSCOPY EXCISE LESIONS: CPT

## 2017-04-24 PROCEDURE — 49203: CPT | Mod: AS

## 2017-04-24 PROCEDURE — 88305 TISSUE EXAM BY PATHOLOGIST: CPT | Mod: 26

## 2017-04-24 PROCEDURE — 58662 LAPAROSCOPY EXCISE LESIONS: CPT | Mod: AS

## 2017-04-24 PROCEDURE — 49203: CPT

## 2017-04-24 PROCEDURE — 49321 LAPAROSCOPY BIOPSY: CPT | Mod: 59

## 2017-04-24 RX ORDER — CEFOTETAN DISODIUM 1 G
2 VIAL (EA) INJECTION ONCE
Qty: 0 | Refills: 0 | Status: COMPLETED | OUTPATIENT
Start: 2017-04-25 | End: 2017-04-25

## 2017-04-24 RX ORDER — CEFOTETAN DISODIUM 1 G
2 VIAL (EA) INJECTION ONCE
Qty: 0 | Refills: 0 | Status: COMPLETED | OUTPATIENT
Start: 2017-04-24 | End: 2017-04-24

## 2017-04-24 RX ORDER — SODIUM CHLORIDE 9 MG/ML
1000 INJECTION, SOLUTION INTRAVENOUS
Qty: 0 | Refills: 0 | Status: DISCONTINUED | OUTPATIENT
Start: 2017-04-24 | End: 2017-04-24

## 2017-04-24 RX ORDER — ONDANSETRON 8 MG/1
4 TABLET, FILM COATED ORAL EVERY 6 HOURS
Qty: 0 | Refills: 0 | Status: DISCONTINUED | OUTPATIENT
Start: 2017-04-24 | End: 2017-04-25

## 2017-04-24 RX ORDER — SODIUM CHLORIDE 9 MG/ML
1000 INJECTION INTRAMUSCULAR; INTRAVENOUS; SUBCUTANEOUS
Qty: 0 | Refills: 0 | Status: DISCONTINUED | OUTPATIENT
Start: 2017-04-24 | End: 2017-04-25

## 2017-04-24 RX ORDER — HEPARIN SODIUM 5000 [USP'U]/ML
5000 INJECTION INTRAVENOUS; SUBCUTANEOUS EVERY 8 HOURS
Qty: 0 | Refills: 0 | Status: DISCONTINUED | OUTPATIENT
Start: 2017-04-25 | End: 2017-04-25

## 2017-04-24 RX ORDER — ONDANSETRON 8 MG/1
4 TABLET, FILM COATED ORAL ONCE
Qty: 0 | Refills: 0 | Status: DISCONTINUED | OUTPATIENT
Start: 2017-04-24 | End: 2017-04-25

## 2017-04-24 RX ORDER — ACETAMINOPHEN 500 MG
1000 TABLET ORAL ONCE
Qty: 0 | Refills: 0 | Status: COMPLETED | OUTPATIENT
Start: 2017-04-24 | End: 2017-04-24

## 2017-04-24 RX ORDER — HYDROMORPHONE HYDROCHLORIDE 2 MG/ML
0.5 INJECTION INTRAMUSCULAR; INTRAVENOUS; SUBCUTANEOUS
Qty: 0 | Refills: 0 | Status: DISCONTINUED | OUTPATIENT
Start: 2017-04-24 | End: 2017-04-25

## 2017-04-24 RX ORDER — HEPARIN SODIUM 5000 [USP'U]/ML
5000 INJECTION INTRAVENOUS; SUBCUTANEOUS ONCE
Qty: 0 | Refills: 0 | Status: COMPLETED | OUTPATIENT
Start: 2017-04-24 | End: 2017-04-24

## 2017-04-24 RX ORDER — NALOXONE HYDROCHLORIDE 4 MG/.1ML
0.1 SPRAY NASAL
Qty: 0 | Refills: 0 | Status: DISCONTINUED | OUTPATIENT
Start: 2017-04-24 | End: 2017-04-25

## 2017-04-24 RX ORDER — HYDROMORPHONE HYDROCHLORIDE 2 MG/ML
30 INJECTION INTRAMUSCULAR; INTRAVENOUS; SUBCUTANEOUS
Qty: 0 | Refills: 0 | Status: DISCONTINUED | OUTPATIENT
Start: 2017-04-24 | End: 2017-04-25

## 2017-04-24 RX ORDER — MEPERIDINE HYDROCHLORIDE 50 MG/ML
12.5 INJECTION INTRAMUSCULAR; INTRAVENOUS; SUBCUTANEOUS
Qty: 0 | Refills: 0 | Status: DISCONTINUED | OUTPATIENT
Start: 2017-04-24 | End: 2017-04-25

## 2017-04-24 RX ADMIN — HYDROMORPHONE HYDROCHLORIDE 30 MILLILITER(S): 2 INJECTION INTRAMUSCULAR; INTRAVENOUS; SUBCUTANEOUS at 14:26

## 2017-04-24 RX ADMIN — Medication 400 MILLIGRAM(S): at 21:51

## 2017-04-24 RX ADMIN — SODIUM CHLORIDE 3 MILLILITER(S): 9 INJECTION INTRAMUSCULAR; INTRAVENOUS; SUBCUTANEOUS at 21:24

## 2017-04-24 RX ADMIN — Medication 1000 MILLIGRAM(S): at 22:10

## 2017-04-24 RX ADMIN — SODIUM CHLORIDE 100 MILLILITER(S): 9 INJECTION INTRAMUSCULAR; INTRAVENOUS; SUBCUTANEOUS at 21:49

## 2017-04-24 RX ADMIN — OXYCODONE HYDROCHLORIDE 10 MILLIGRAM(S): 5 TABLET ORAL at 10:50

## 2017-04-24 RX ADMIN — HEPARIN SODIUM 5000 UNIT(S): 5000 INJECTION INTRAVENOUS; SUBCUTANEOUS at 10:51

## 2017-04-24 RX ADMIN — FAMOTIDINE 20 MILLIGRAM(S): 10 INJECTION INTRAVENOUS at 10:50

## 2017-04-24 RX ADMIN — Medication 975 MILLIGRAM(S): at 10:50

## 2017-04-24 RX ADMIN — ALVIMOPAN 12 MILLIGRAM(S): 12 CAPSULE ORAL at 10:50

## 2017-04-24 NOTE — BRIEF OPERATIVE NOTE - OPERATION/FINDINGS
upper abdomen adhesions, left pelvic wall adhesions, right pelvic wall endometriosis, no evidence of colovaginal or colovesical fistula

## 2017-04-24 NOTE — BRIEF OPERATIVE NOTE - PROCEDURE
Endometrial ablation  04/24/2017    Active  MBERRONESJR  Laparoscopy, diagnostic  04/24/2017    Active  MBERRONESJR

## 2017-04-24 NOTE — BRIEF OPERATIVE NOTE - POST-OP DX
Endometriosis of pelvis  04/24/2017    Active  Mick Fink  Peritoneal adhesions  04/24/2017    Active  Mick Fink

## 2017-04-25 ENCOUNTER — TRANSCRIPTION ENCOUNTER (OUTPATIENT)
Age: 52
End: 2017-04-25

## 2017-04-25 VITALS
OXYGEN SATURATION: 98 % | DIASTOLIC BLOOD PRESSURE: 55 MMHG | HEART RATE: 77 BPM | RESPIRATION RATE: 16 BRPM | TEMPERATURE: 98 F | SYSTOLIC BLOOD PRESSURE: 100 MMHG

## 2017-04-25 LAB
MAGNESIUM SERPL-MCNC: 1.8 MG/DL — SIGNIFICANT CHANGE UP (ref 1.8–2.4)
PHOSPHATE SERPL-MCNC: 2.8 MG/DL — SIGNIFICANT CHANGE UP (ref 2.5–4.5)

## 2017-04-25 RX ADMIN — SODIUM CHLORIDE 3 MILLILITER(S): 9 INJECTION INTRAMUSCULAR; INTRAVENOUS; SUBCUTANEOUS at 05:25

## 2017-04-25 RX ADMIN — SODIUM CHLORIDE 3 MILLILITER(S): 9 INJECTION INTRAMUSCULAR; INTRAVENOUS; SUBCUTANEOUS at 14:39

## 2017-04-25 RX ADMIN — SODIUM CHLORIDE 3 MILLILITER(S): 9 INJECTION INTRAMUSCULAR; INTRAVENOUS; SUBCUTANEOUS at 05:24

## 2017-04-25 RX ADMIN — HEPARIN SODIUM 5000 UNIT(S): 5000 INJECTION INTRAVENOUS; SUBCUTANEOUS at 05:27

## 2017-04-25 RX ADMIN — SODIUM CHLORIDE 100 MILLILITER(S): 9 INJECTION INTRAMUSCULAR; INTRAVENOUS; SUBCUTANEOUS at 08:43

## 2017-04-25 RX ADMIN — HEPARIN SODIUM 5000 UNIT(S): 5000 INJECTION INTRAVENOUS; SUBCUTANEOUS at 15:17

## 2017-04-25 RX ADMIN — Medication 100 GRAM(S): at 02:58

## 2017-04-25 NOTE — DISCHARGE NOTE ADULT - MEDICATION SUMMARY - MEDICATIONS TO TAKE
I will START or STAY ON the medications listed below when I get home from the hospital:    oxyCODONE 10 mg oral tablet  -- 10 milligram(s) by mouth 3 times a day  -- Indication: For pain    Valium 5 mg oral tablet  -- 5 milligram(s) by mouth 4 times a day, As Needed  -- Indication: For home med    Sarafem 20 mg oral capsule  -- 1 cap(s) by mouth once a day (in the morning)  -- Indication: For home med    Elavil 10 mg oral tablet  -- 2 tab(s) by mouth once a day (at bedtime)  -- Indication: For home med    Bystolic 10 mg oral tablet  -- 1 tab(s) by mouth once a day (in the morning)  -- Indication: For home med    Movantik 25 mg oral tablet  -- 1 tab(s) by mouth once a day (in the morning)  -- Indication: For home med    methenamine hippurate 1 g oral tablet  -- 1 tab(s) by mouth once a day  -- Indication: For home med    Macrobid  -- 50 milligram(s) by mouth once a day  -- Indication: For home med    Vitamin D2 50,000 intl units (1.25 mg) oral capsule  -- 1 cap(s) by mouth once a week  -- taken on Sundays  -- Indication: For home med

## 2017-04-25 NOTE — DISCHARGE NOTE ADULT - CARE PROVIDERS DIRECT ADDRESSES
[email or web address]

## 2017-04-25 NOTE — PROGRESS NOTE ADULT - SUBJECTIVE AND OBJECTIVE BOX
Feels well this morning. Had some left lower abdominal pain yesterday which has improved since passing flatus. Tolerating diet    Exam:  Vital Signs Last 24 Hrs  T(C): 36.8, Max: 36.8 (04-24 @ 16:08)  T(F): 98.3, Max: 98.3 (04-25 @ 00:09)  HR: 65 (65 - 87)  BP: 111/60 (100/51 - 131/67)  RR: 16 (12 - 16)  SpO2: 95% (94% - 100%)    General: in no distress  Respiratory: non labored breathing on room air  Abdomen: soft, non tender, non distended   Neuro: alert and oriented x 3                          13.5   9.5   )-----------( 167      ( 24 Apr 2017 19:36 )             41.9   04-24    141  |  110<H>  |  12  ----------------------------<  164<H>  3.7   |  20<L>  |  1.28    Ca    8.0<L>      24 Apr 2017 19:36  Phos  2.8     04-25  Mg     1.8     04-25

## 2017-04-25 NOTE — PROGRESS NOTE ADULT - ASSESSMENT
POD 1 s.p diagnostic laparoscopy and ablation of endometriosis. Doing well    On regular diet and anticipate discharge later this afternoon.

## 2017-04-25 NOTE — DISCHARGE NOTE ADULT - CARE PROVIDER_API CALL
Mark Loomis), ColonRectal Surgery; Surgery  [address] [facility] [address] [place] [address]  Phone: [phone]  Fax: [phone]

## 2017-04-25 NOTE — DISCHARGE NOTE ADULT - PATIENT PORTAL LINK FT
“You can access the [facility] Patient Portal, offered by [facility], by registering with the following website: [email or web address]”

## 2017-04-25 NOTE — DISCHARGE NOTE ADULT - HOSPITAL COURSE
Patient was admitted to the hospital for elective surgery. She has symptoms concerning for colovaginal fistula and diagnostic laparoscopy was performed. There was no evidence of fistula but endometriosis was noted and ablated. She was observed overnight and did well from surgery. She was tolerating a diet and voiding independently at the time of discharge

## 2017-04-25 NOTE — DISCHARGE NOTE ADULT - CARE PLAN
Principal Discharge DX:	Endometriosis  Goal:	recover  Instructions for follow-up, activity and diet:	resume regular diet  Secondary Diagnosis:	Pudendal neuralgia  Secondary Diagnosis:	Incontinence of feces, unspecified fecal incontinence type

## 2017-04-28 DIAGNOSIS — G58.8 OTHER SPECIFIED MONONEUROPATHIES: ICD-10-CM

## 2017-04-28 DIAGNOSIS — N73.6 FEMALE PELVIC PERITONEAL ADHESIONS (POSTINFECTIVE): ICD-10-CM

## 2017-04-28 DIAGNOSIS — N80.3 ENDOMETRIOSIS OF PELVIC PERITONEUM: ICD-10-CM

## 2017-04-28 DIAGNOSIS — R15.9 FULL INCONTINENCE OF FECES: ICD-10-CM

## 2017-04-28 DIAGNOSIS — I10 ESSENTIAL (PRIMARY) HYPERTENSION: ICD-10-CM

## 2017-04-28 DIAGNOSIS — E78.5 HYPERLIPIDEMIA, UNSPECIFIED: ICD-10-CM

## 2017-04-28 LAB — SURGICAL PATHOLOGY FINAL REPORT - CH: SIGNIFICANT CHANGE UP

## 2017-05-06 ENCOUNTER — INPATIENT (INPATIENT)
Facility: HOSPITAL | Age: 52
LOS: 5 days | Discharge: HOME | End: 2017-05-12
Attending: INTERNAL MEDICINE | Admitting: INTERNAL MEDICINE
Payer: COMMERCIAL

## 2017-05-06 VITALS
RESPIRATION RATE: 20 BRPM | HEART RATE: 88 BPM | TEMPERATURE: 98 F | WEIGHT: 190.04 LBS | DIASTOLIC BLOOD PRESSURE: 60 MMHG | SYSTOLIC BLOOD PRESSURE: 125 MMHG | OXYGEN SATURATION: 100 %

## 2017-05-06 DIAGNOSIS — Z98.890 OTHER SPECIFIED POSTPROCEDURAL STATES: Chronic | ICD-10-CM

## 2017-05-06 DIAGNOSIS — S96.812A STRAIN OF OTHER SPECIFIED MUSCLES AND TENDONS AT ANKLE AND FOOT LEVEL, LEFT FOOT, INITIAL ENCOUNTER: Chronic | ICD-10-CM

## 2017-05-06 DIAGNOSIS — Q78.0 OSTEOGENESIS IMPERFECTA: Chronic | ICD-10-CM

## 2017-05-06 LAB
ADD ON TEST-SPECIMEN IN LAB: SIGNIFICANT CHANGE UP
ADD ON TEST-SPECIMEN IN LAB: SIGNIFICANT CHANGE UP
ALBUMIN SERPL ELPH-MCNC: 3.8 G/DL — SIGNIFICANT CHANGE UP (ref 3.3–5)
ALP SERPL-CCNC: 99 U/L — SIGNIFICANT CHANGE UP (ref 40–120)
ALT FLD-CCNC: 32 U/L — SIGNIFICANT CHANGE UP (ref 12–78)
ANION GAP SERPL CALC-SCNC: 10 MMOL/L — SIGNIFICANT CHANGE UP (ref 5–17)
ANISOCYTOSIS BLD QL: SIGNIFICANT CHANGE UP
APPEARANCE UR: (no result)
APTT BLD: 31 SEC — SIGNIFICANT CHANGE UP (ref 27.5–37.4)
AST SERPL-CCNC: 44 U/L — HIGH (ref 15–37)
BACTERIA # UR AUTO: (no result)
BASO STIPL BLD QL SMEAR: SLIGHT — SIGNIFICANT CHANGE UP
BASOPHILS # BLD AUTO: 0.2 K/UL — SIGNIFICANT CHANGE UP (ref 0–0.2)
BASOPHILS NFR BLD AUTO: 1 % — SIGNIFICANT CHANGE UP (ref 0–2)
BILIRUB DIRECT SERPL-MCNC: 0.5 MG/DL — HIGH (ref 0–0.2)
BILIRUB SERPL-MCNC: 3.2 MG/DL — HIGH (ref 0.2–1.2)
BILIRUB UR-MCNC: NEGATIVE — SIGNIFICANT CHANGE UP
BLD GP AB SCN SERPL QL: SIGNIFICANT CHANGE UP
BUN SERPL-MCNC: 14 MG/DL — SIGNIFICANT CHANGE UP (ref 7–23)
CALCIUM SERPL-MCNC: 9.1 MG/DL — SIGNIFICANT CHANGE UP (ref 8.5–10.1)
CHLORIDE SERPL-SCNC: 105 MMOL/L — SIGNIFICANT CHANGE UP (ref 96–108)
CO2 SERPL-SCNC: 26 MMOL/L — SIGNIFICANT CHANGE UP (ref 22–31)
COLOR SPEC: YELLOW — SIGNIFICANT CHANGE UP
CREAT SERPL-MCNC: 0.92 MG/DL — SIGNIFICANT CHANGE UP (ref 0.5–1.3)
DIFF PNL FLD: (no result)
EOSINOPHIL # BLD AUTO: 0.3 K/UL — SIGNIFICANT CHANGE UP (ref 0–0.5)
EOSINOPHIL NFR BLD AUTO: 5 % — SIGNIFICANT CHANGE UP (ref 0–6)
EPI CELLS # UR: SIGNIFICANT CHANGE UP
FERRITIN SERPL-MCNC: 1128 NG/ML — HIGH (ref 15–150)
FOLATE SERPL-MCNC: 6.7 NG/ML — SIGNIFICANT CHANGE UP (ref 4.8–24.2)
GLUCOSE SERPL-MCNC: 103 MG/DL — HIGH (ref 70–99)
GLUCOSE UR QL: NEGATIVE MG/DL — SIGNIFICANT CHANGE UP
HCT VFR BLD CALC: 22 % — LOW (ref 34.5–45)
HCT VFR BLD CALC: 23 % — LOW (ref 34.5–45)
HGB BLD-MCNC: 8 G/DL — LOW (ref 11.5–15.5)
HGB BLD-MCNC: 8.6 G/DL — LOW (ref 11.5–15.5)
HYPOCHROMIA BLD QL: SLIGHT — SIGNIFICANT CHANGE UP
INR BLD: 1.11 RATIO — SIGNIFICANT CHANGE UP (ref 0.88–1.16)
IRON SATN MFR SERPL: 280 UG/DL — HIGH (ref 30–160)
IRON SATN MFR SERPL: 94 % — HIGH (ref 14–50)
KETONES UR-MCNC: NEGATIVE — SIGNIFICANT CHANGE UP
LACTATE SERPL-SCNC: 1.3 MMOL/L — SIGNIFICANT CHANGE UP (ref 0.7–2)
LDH SERPL L TO P-CCNC: 769 U/L — HIGH (ref 50–242)
LEUKOCYTE ESTERASE UR-ACNC: (no result)
LIDOCAIN IGE QN: 122 U/L — SIGNIFICANT CHANGE UP (ref 73–393)
LYMPHOCYTES # BLD AUTO: 32 % — SIGNIFICANT CHANGE UP (ref 13–44)
LYMPHOCYTES # BLD AUTO: 5.8 K/UL — HIGH (ref 1–3.3)
MACROCYTES BLD QL: SLIGHT — SIGNIFICANT CHANGE UP
MAGNESIUM SERPL-MCNC: 2.3 MG/DL — SIGNIFICANT CHANGE UP (ref 1.8–2.4)
MANUAL DIF COMMENT BLD-IMP: SIGNIFICANT CHANGE UP
MCHC RBC-ENTMCNC: 34.2 PG — HIGH (ref 27–34)
MCHC RBC-ENTMCNC: 34.3 PG — HIGH (ref 27–34)
MCHC RBC-ENTMCNC: 36.5 GM/DL — HIGH (ref 32–36)
MCHC RBC-ENTMCNC: 37.4 GM/DL — HIGH (ref 32–36)
MCV RBC AUTO: 91.2 FL — SIGNIFICANT CHANGE UP (ref 80–100)
MCV RBC AUTO: 94.2 FL — SIGNIFICANT CHANGE UP (ref 80–100)
METAMYELOCYTES # FLD: 1 % — HIGH (ref 0–0)
MICROCYTES BLD QL: SLIGHT — SIGNIFICANT CHANGE UP
MONOCYTES # BLD AUTO: 1 K/UL — HIGH (ref 0–0.9)
MONOCYTES NFR BLD AUTO: 8 % — SIGNIFICANT CHANGE UP (ref 2–14)
MYELOCYTES NFR BLD: 1 % — HIGH (ref 0–0)
NEUTROPHILS # BLD AUTO: 10.6 K/UL — HIGH (ref 1.8–7.4)
NEUTROPHILS NFR BLD AUTO: 52 % — SIGNIFICANT CHANGE UP (ref 43–77)
NITRITE UR-MCNC: NEGATIVE — SIGNIFICANT CHANGE UP
NRBC # BLD: 6 /100 — HIGH (ref 0–0)
PH UR: 6 — SIGNIFICANT CHANGE UP (ref 5–8)
PLAT MORPH BLD: NORMAL — SIGNIFICANT CHANGE UP
PLATELET # BLD AUTO: 241 K/UL — SIGNIFICANT CHANGE UP (ref 150–400)
PLATELET # BLD AUTO: 276 K/UL — SIGNIFICANT CHANGE UP (ref 150–400)
POIKILOCYTOSIS BLD QL AUTO: SLIGHT — SIGNIFICANT CHANGE UP
POLYCHROMASIA BLD QL SMEAR: SIGNIFICANT CHANGE UP
POTASSIUM SERPL-MCNC: 3.8 MMOL/L — SIGNIFICANT CHANGE UP (ref 3.5–5.3)
POTASSIUM SERPL-SCNC: 3.8 MMOL/L — SIGNIFICANT CHANGE UP (ref 3.5–5.3)
PROT SERPL-MCNC: 7.2 GM/DL — SIGNIFICANT CHANGE UP (ref 6–8.3)
PROT UR-MCNC: NEGATIVE MG/DL — SIGNIFICANT CHANGE UP
PROTHROM AB SERPL-ACNC: 12 SEC — SIGNIFICANT CHANGE UP (ref 9.8–12.7)
RBC # BLD: 2.34 M/UL — LOW (ref 3.8–5.2)
RBC # BLD: 2.35 M/UL — LOW (ref 3.8–5.2)
RBC # BLD: 2.52 M/UL — LOW (ref 3.8–5.2)
RBC # FLD: 26 % — HIGH (ref 10.3–14.5)
RBC # FLD: 27.5 % — HIGH (ref 10.3–14.5)
RBC BLD AUTO: (no result)
RBC CASTS # UR COMP ASSIST: (no result) /HPF (ref 0–4)
RETICS #: 296.6 K/UL — HIGH (ref 25–125)
RETICS/RBC NFR: 12.6 % — HIGH (ref 0.5–2.5)
SODIUM SERPL-SCNC: 141 MMOL/L — SIGNIFICANT CHANGE UP (ref 135–145)
SP GR SPEC: 1 — LOW (ref 1.01–1.02)
SPHEROCYTES BLD QL SMEAR: SLIGHT — SIGNIFICANT CHANGE UP
TIBC SERPL-MCNC: 298 UG/DL — SIGNIFICANT CHANGE UP (ref 220–430)
TROPONIN I SERPL-MCNC: <0.015 NG/ML — SIGNIFICANT CHANGE UP (ref 0.01–0.04)
TROPONIN I SERPL-MCNC: <0.015 NG/ML — SIGNIFICANT CHANGE UP (ref 0.01–0.04)
TSH SERPL-MCNC: 2.28 UU/ML — SIGNIFICANT CHANGE UP (ref 0.36–3.74)
TYPE + AB SCN PNL BLD: SIGNIFICANT CHANGE UP
UIBC SERPL-MCNC: 18 UG/DL — LOW (ref 110–370)
UROBILINOGEN FLD QL: NEGATIVE MG/DL — SIGNIFICANT CHANGE UP
VIT B12 SERPL-MCNC: 608 PG/ML — SIGNIFICANT CHANGE UP (ref 243–894)
WBC # BLD: 15.9 K/UL — HIGH (ref 3.8–10.5)
WBC # BLD: 17.2 K/UL — HIGH (ref 3.8–10.5)
WBC # FLD AUTO: 15.9 K/UL — HIGH (ref 3.8–10.5)
WBC # FLD AUTO: 17.2 K/UL — HIGH (ref 3.8–10.5)
WBC UR QL: (no result)

## 2017-05-06 PROCEDURE — 93010 ELECTROCARDIOGRAM REPORT: CPT

## 2017-05-06 PROCEDURE — 99291 CRITICAL CARE FIRST HOUR: CPT

## 2017-05-06 PROCEDURE — 74177 CT ABD & PELVIS W/CONTRAST: CPT | Mod: 26

## 2017-05-06 RX ORDER — METHENAMINE MANDELATE 1 G
1 TABLET ORAL
Qty: 0 | Refills: 0 | COMMUNITY

## 2017-05-06 RX ORDER — NALOXEGOL OXALATE 12.5 MG/1
1 TABLET, FILM COATED ORAL
Qty: 0 | Refills: 0 | COMMUNITY

## 2017-05-06 RX ORDER — CEFTRIAXONE 500 MG/1
1 INJECTION, POWDER, FOR SOLUTION INTRAMUSCULAR; INTRAVENOUS EVERY 24 HOURS
Qty: 0 | Refills: 0 | Status: DISCONTINUED | OUTPATIENT
Start: 2017-05-06 | End: 2017-05-11

## 2017-05-06 RX ORDER — ONDANSETRON 8 MG/1
4 TABLET, FILM COATED ORAL ONCE
Qty: 0 | Refills: 0 | Status: COMPLETED | OUTPATIENT
Start: 2017-05-06 | End: 2017-05-06

## 2017-05-06 RX ORDER — ONDANSETRON 8 MG/1
4 TABLET, FILM COATED ORAL EVERY 6 HOURS
Qty: 0 | Refills: 0 | Status: DISCONTINUED | OUTPATIENT
Start: 2017-05-06 | End: 2017-05-12

## 2017-05-06 RX ORDER — NITROFURANTOIN MACROCRYSTAL 50 MG
50 CAPSULE ORAL
Qty: 0 | Refills: 0 | COMMUNITY

## 2017-05-06 RX ORDER — ERGOCALCIFEROL 1.25 MG/1
1 CAPSULE ORAL
Qty: 0 | Refills: 0 | COMMUNITY

## 2017-05-06 RX ORDER — FLUOXETINE HCL 10 MG
20 CAPSULE ORAL DAILY
Qty: 0 | Refills: 0 | Status: DISCONTINUED | OUTPATIENT
Start: 2017-05-06 | End: 2017-05-12

## 2017-05-06 RX ORDER — NEBIVOLOL HYDROCHLORIDE 5 MG/1
1 TABLET ORAL
Qty: 0 | Refills: 0 | COMMUNITY

## 2017-05-06 RX ORDER — ZALEPLON 10 MG
5 CAPSULE ORAL AT BEDTIME
Qty: 0 | Refills: 0 | Status: DISCONTINUED | OUTPATIENT
Start: 2017-05-06 | End: 2017-05-12

## 2017-05-06 RX ORDER — OXYCODONE HYDROCHLORIDE 5 MG/1
10 TABLET ORAL THREE TIMES A DAY
Qty: 0 | Refills: 0 | Status: DISCONTINUED | OUTPATIENT
Start: 2017-05-06 | End: 2017-05-12

## 2017-05-06 RX ORDER — HYDROMORPHONE HYDROCHLORIDE 2 MG/ML
1 INJECTION INTRAMUSCULAR; INTRAVENOUS; SUBCUTANEOUS ONCE
Qty: 0 | Refills: 0 | Status: DISCONTINUED | OUTPATIENT
Start: 2017-05-06 | End: 2017-05-06

## 2017-05-06 RX ORDER — SODIUM CHLORIDE 9 MG/ML
1000 INJECTION, SOLUTION INTRAVENOUS
Qty: 0 | Refills: 0 | Status: DISCONTINUED | OUTPATIENT
Start: 2017-05-06 | End: 2017-05-11

## 2017-05-06 RX ORDER — AMITRIPTYLINE HCL 25 MG
2 TABLET ORAL
Qty: 0 | Refills: 0 | COMMUNITY

## 2017-05-06 RX ORDER — MORPHINE SULFATE 50 MG/1
2 CAPSULE, EXTENDED RELEASE ORAL
Qty: 0 | Refills: 0 | Status: DISCONTINUED | OUTPATIENT
Start: 2017-05-06 | End: 2017-05-12

## 2017-05-06 RX ADMIN — HYDROMORPHONE HYDROCHLORIDE 1 MILLIGRAM(S): 2 INJECTION INTRAMUSCULAR; INTRAVENOUS; SUBCUTANEOUS at 10:45

## 2017-05-06 RX ADMIN — OXYCODONE HYDROCHLORIDE 10 MILLIGRAM(S): 5 TABLET ORAL at 22:30

## 2017-05-06 RX ADMIN — CEFTRIAXONE 100 GRAM(S): 500 INJECTION, POWDER, FOR SOLUTION INTRAMUSCULAR; INTRAVENOUS at 17:01

## 2017-05-06 RX ADMIN — ONDANSETRON 104 MILLIGRAM(S): 8 TABLET, FILM COATED ORAL at 10:45

## 2017-05-06 RX ADMIN — OXYCODONE HYDROCHLORIDE 10 MILLIGRAM(S): 5 TABLET ORAL at 21:56

## 2017-05-06 RX ADMIN — Medication 1 TABLET(S): at 18:03

## 2017-05-06 RX ADMIN — MORPHINE SULFATE 2 MILLIGRAM(S): 50 CAPSULE, EXTENDED RELEASE ORAL at 18:03

## 2017-05-06 RX ADMIN — HYDROMORPHONE HYDROCHLORIDE 1 MILLIGRAM(S): 2 INJECTION INTRAMUSCULAR; INTRAVENOUS; SUBCUTANEOUS at 14:10

## 2017-05-06 RX ADMIN — SODIUM CHLORIDE 100 MILLILITER(S): 9 INJECTION, SOLUTION INTRAVENOUS at 22:12

## 2017-05-06 RX ADMIN — HYDROMORPHONE HYDROCHLORIDE 1 MILLIGRAM(S): 2 INJECTION INTRAMUSCULAR; INTRAVENOUS; SUBCUTANEOUS at 10:30

## 2017-05-06 RX ADMIN — MORPHINE SULFATE 2 MILLIGRAM(S): 50 CAPSULE, EXTENDED RELEASE ORAL at 18:18

## 2017-05-06 RX ADMIN — Medication 20 MILLIGRAM(S): at 18:03

## 2017-05-06 NOTE — H&P ADULT - NSHPLABSRESULTS_GEN_ALL_CORE
CARDIAC MARKERS ( 06 May 2017 13:13 )  <0.015 ng/mL / x     / x     / x     / x      CARDIAC MARKERS ( 06 May 2017 10:29 )  <0.015 ng/mL / x     / x     / x     / x                                8.0    17.2  )-----------( 276      ( 06 May 2017 10:29 )             22.0     06 May 2017 10:29    141    |  105    |  14     ----------------------------<  103    3.8     |  26     |  0.92     Ca    9.1        06 May 2017 10:29  Mg     2.3       06 May 2017 10:29    TPro  7.2    /  Alb  3.8    /  TBili  3.2    /  DBili  0.5    /  AST  44     /  ALT  32     /  AlkPhos  99     06 May 2017 10:29    PT/INR - ( 06 May 2017 10:29 )   PT: 12.0 sec;   INR: 1.11 ratio         PTT - ( 06 May 2017 10:29 )  PTT:31.0 sec  CAPILLARY BLOOD GLUCOSE    LIVER FUNCTIONS - ( 06 May 2017 10:29 )  Alb: 3.8 g/dL / Pro: 7.2 gm/dL / ALK PHOS: 99 U/L / ALT: 32 U/L / AST: 44 U/L / GGT: x           Urinalysis Basic - ( 06 May 2017 12:15 )    Color: Yellow / Appearance: Slightly Turbid / S.005 / pH: x  Gluc: x / Ketone: Negative  / Bili: Negative / Urobili: Negative mg/dL   Blood: x / Protein: Negative mg/dL / Nitrite: Negative   Leuk Esterase: Moderate / RBC: 3-5 /HPF / WBC 11-25   Sq Epi: x / Non Sq Epi: Few / Bacteria: Few

## 2017-05-06 NOTE — CONSULT NOTE ADULT - ASSESSMENT
Dx abdominal pain and nausea  May be related to jaundice no intrabdominal findings on ctscan  treat UTi  will follow

## 2017-05-06 NOTE — H&P ADULT - HISTORY OF PRESENT ILLNESS
Patient is 52yo female with PMHx of Barretts esophagus, Endometriosis, HLD, HTN. Rectovaginal fistula, s/p recent laparoscopic examination of the abd which demonstrated endometriosis on 4/24 presents with weakness and jaundice. Pt reports on Wed, she started feeling weak, fatigued, decreased energy and noted yellowing of her skin and eyes. Pt denies fever chills, cough, chest pain, melena, dark stools. Endorses new onset SOB, without CP/palpitations. No other constitutional symptoms. Upon arrival to the ER HH noted to be 8, baseline 13, 2 weeks ago.

## 2017-05-06 NOTE — H&P ADULT - ASSESSMENT
52yo female a/w anemia    # Anemia  - currently afebrile, HD stable, comfortable on room air  - HH=13, on 4/24, today 8, guiac neg, denies melena  - labs demonstrate an elevated TBilli, with DB 0.5, likely signifying a hemolytic process  - patient currently receiving 1u PRBC  - repeat CBC post transfusion  - check LDH, retic count, peripheral smear  - hematology consult  - GI consult  - hold BP meds  - hold HSQ    # HTN  - hold BP meds for now    # HLD  - patient not on any statins    # leukocytosis  - afebrile, HD stable, has +UA  - treat UTI  - obtain blood cultures, and urine culture      # UTI  - Rocephin    # DVT ppx, SCDs    # Admit, stable

## 2017-05-06 NOTE — ED PROVIDER NOTE - OBJECTIVE STATEMENT
50 yo pt presents for juandice, weakness and near syncope.  Pt s/p surgery for colonic vaginal fistula in mid April.  For the last 3 days pt has become more and more yellow.  Pt has also become more and more weak.  Eating very little.  Denies vomit.  No fall.  Now with increase abd pain.  + BM.  No travel, no sick contact.  Today pt states may have "blacked out" for a moment.

## 2017-05-06 NOTE — ED ADULT TRIAGE NOTE - CHIEF COMPLAINT QUOTE
Pt presents to ED c/o jaundice and weakness/near syncope since Wednesday. Pt reports she had colon sx with Dr Treviño on 4/24/17. Pt reports chills

## 2017-05-06 NOTE — ED ADULT NURSE NOTE - CHPI ED SYMPTOMS NEG
no burning urination/no chills/no vomiting/no abdominal distension/no fever/no hematuria/no diarrhea/no dysuria/no blood in stool

## 2017-05-06 NOTE — H&P ADULT - NSHPREVIEWOFSYSTEMS_GEN_ALL_CORE
(-)Fever, chills, cough, chest pain, headache, dizziness, palpitations, abd pain, n/v/d, leg swelling, melena  (+) weakness, fatigue, jaundice, sob

## 2017-05-06 NOTE — ED PROVIDER NOTE - PROGRESS NOTE DETAILS
Kriss Moody: Rectal exam performed By ED attending Dr. Padron. Lot [id number]. QC control intact. Guaiac negative, yellow stool. Kriss Moody: Discussed with pt R/B/A of blood transfusion and explained transfusion to pt and . Pt and  understand, have no other questions, agree to blood transfusion. Srikanth Moody: Pt's PMD= Dr. Rodriguez. Kriss Moody: Dr. Ruiz (colorectal) paged. Kriss Moody: Case discussed with Dr. Rodriguez (colorectal), will evaluate pt and suggests GI consult. Attending buck Padron/ruthy Mendoza for admisison

## 2017-05-06 NOTE — H&P ADULT - NSHPPHYSICALEXAM_GEN_ALL_CORE
T(C): 36.9, Max: 36.9 (05-06 @ 15:17)  HR: 82 (80 - 89)  BP: 103/73 (96/52 - 125/60)  RR: 16 (13 - 20)  SpO2: 97% (95% - 100%)  Wt(kg): --    Gen: AAOx3, NAD  HEENT: NCAT, EOMI, scleral icterus  Neck: Supple  CV: nml S1S2, RRR  Lungs: CTABL  Abd: Soft, NT, ND, BS+  Ext: No edema  Neuro: Non focal

## 2017-05-07 LAB
ADD ON TEST-SPECIMEN IN LAB: SIGNIFICANT CHANGE UP
ADD ON TEST-SPECIMEN IN LAB: SIGNIFICANT CHANGE UP
ALBUMIN SERPL ELPH-MCNC: 3.3 G/DL — SIGNIFICANT CHANGE UP (ref 3.3–5)
ALBUMIN SERPL ELPH-MCNC: 3.3 G/DL — SIGNIFICANT CHANGE UP (ref 3.3–5)
ALP SERPL-CCNC: 84 U/L — SIGNIFICANT CHANGE UP (ref 40–120)
ALP SERPL-CCNC: 84 U/L — SIGNIFICANT CHANGE UP (ref 40–120)
ALT FLD-CCNC: 32 U/L — SIGNIFICANT CHANGE UP (ref 12–78)
ALT FLD-CCNC: 32 U/L — SIGNIFICANT CHANGE UP (ref 12–78)
ANION GAP SERPL CALC-SCNC: 10 MMOL/L — SIGNIFICANT CHANGE UP (ref 5–17)
ANION GAP SERPL CALC-SCNC: 6 MMOL/L — SIGNIFICANT CHANGE UP (ref 5–17)
AST SERPL-CCNC: 43 U/L — HIGH (ref 15–37)
AST SERPL-CCNC: 43 U/L — HIGH (ref 15–37)
BASOPHILS # BLD AUTO: 0.2 K/UL — SIGNIFICANT CHANGE UP (ref 0–0.2)
BASOPHILS NFR BLD AUTO: 1.1 % — SIGNIFICANT CHANGE UP (ref 0–2)
BILIRUB DIRECT SERPL-MCNC: 0.3 MG/DL — HIGH (ref 0–0.2)
BILIRUB INDIRECT FLD-MCNC: 1.6 MG/DL — HIGH (ref 0.2–1)
BILIRUB SERPL-MCNC: 1.9 MG/DL — HIGH (ref 0.2–1.2)
BILIRUB SERPL-MCNC: 1.9 MG/DL — HIGH (ref 0.2–1.2)
BUN SERPL-MCNC: 10 MG/DL — SIGNIFICANT CHANGE UP (ref 7–23)
BUN SERPL-MCNC: 11 MG/DL — SIGNIFICANT CHANGE UP (ref 7–23)
CALCIUM SERPL-MCNC: 8.3 MG/DL — LOW (ref 8.5–10.1)
CALCIUM SERPL-MCNC: 8.4 MG/DL — LOW (ref 8.5–10.1)
CHLORIDE SERPL-SCNC: 107 MMOL/L — SIGNIFICANT CHANGE UP (ref 96–108)
CHLORIDE SERPL-SCNC: 108 MMOL/L — SIGNIFICANT CHANGE UP (ref 96–108)
CO2 SERPL-SCNC: 26 MMOL/L — SIGNIFICANT CHANGE UP (ref 22–31)
CO2 SERPL-SCNC: 28 MMOL/L — SIGNIFICANT CHANGE UP (ref 22–31)
CREAT SERPL-MCNC: 0.89 MG/DL — SIGNIFICANT CHANGE UP (ref 0.5–1.3)
CREAT SERPL-MCNC: 0.91 MG/DL — SIGNIFICANT CHANGE UP (ref 0.5–1.3)
DIR ANTIGLOB POLYSPECIFIC INTERPRETATION: SIGNIFICANT CHANGE UP
EOSINOPHIL # BLD AUTO: 0.3 K/UL — SIGNIFICANT CHANGE UP (ref 0–0.5)
EOSINOPHIL NFR BLD AUTO: 2.5 % — SIGNIFICANT CHANGE UP (ref 0–6)
GLUCOSE SERPL-MCNC: 112 MG/DL — HIGH (ref 70–99)
GLUCOSE SERPL-MCNC: 115 MG/DL — HIGH (ref 70–99)
HCT VFR BLD CALC: 24 % — LOW (ref 34.5–45)
HGB BLD-MCNC: 8.3 G/DL — LOW (ref 11.5–15.5)
LYMPHOCYTES # BLD AUTO: 34.7 % — SIGNIFICANT CHANGE UP (ref 13–44)
LYMPHOCYTES # BLD AUTO: 4.6 K/UL — HIGH (ref 1–3.3)
MCHC RBC-ENTMCNC: 32.2 PG — SIGNIFICANT CHANGE UP (ref 27–34)
MCHC RBC-ENTMCNC: 34.5 GM/DL — SIGNIFICANT CHANGE UP (ref 32–36)
MCV RBC AUTO: 93.4 FL — SIGNIFICANT CHANGE UP (ref 80–100)
MONOCYTES # BLD AUTO: 0.8 K/UL — SIGNIFICANT CHANGE UP (ref 0–0.9)
MONOCYTES NFR BLD AUTO: 6.1 % — SIGNIFICANT CHANGE UP (ref 2–14)
NEUTROPHILS # BLD AUTO: 7.3 K/UL — SIGNIFICANT CHANGE UP (ref 1.8–7.4)
NEUTROPHILS NFR BLD AUTO: 55.6 % — SIGNIFICANT CHANGE UP (ref 43–77)
PLATELET # BLD AUTO: 259 K/UL — SIGNIFICANT CHANGE UP (ref 150–400)
POTASSIUM SERPL-MCNC: 3.9 MMOL/L — SIGNIFICANT CHANGE UP (ref 3.5–5.3)
POTASSIUM SERPL-MCNC: 4.1 MMOL/L — SIGNIFICANT CHANGE UP (ref 3.5–5.3)
POTASSIUM SERPL-SCNC: 3.9 MMOL/L — SIGNIFICANT CHANGE UP (ref 3.5–5.3)
POTASSIUM SERPL-SCNC: 4.1 MMOL/L — SIGNIFICANT CHANGE UP (ref 3.5–5.3)
PROT SERPL-MCNC: 6.3 GM/DL — SIGNIFICANT CHANGE UP (ref 6–8.3)
PROT SERPL-MCNC: 6.3 GM/DL — SIGNIFICANT CHANGE UP (ref 6–8.3)
RBC # BLD: 2.57 M/UL — LOW (ref 3.8–5.2)
RBC # FLD: 28.8 % — HIGH (ref 10.3–14.5)
SODIUM SERPL-SCNC: 142 MMOL/L — SIGNIFICANT CHANGE UP (ref 135–145)
SODIUM SERPL-SCNC: 143 MMOL/L — SIGNIFICANT CHANGE UP (ref 135–145)
WBC # BLD: 13.1 K/UL — HIGH (ref 3.8–10.5)
WBC # FLD AUTO: 13.1 K/UL — HIGH (ref 3.8–10.5)

## 2017-05-07 PROCEDURE — 99253 IP/OBS CNSLTJ NEW/EST LOW 45: CPT

## 2017-05-07 PROCEDURE — 71010: CPT | Mod: 26

## 2017-05-07 RX ADMIN — OXYCODONE HYDROCHLORIDE 10 MILLIGRAM(S): 5 TABLET ORAL at 22:28

## 2017-05-07 RX ADMIN — SODIUM CHLORIDE 100 MILLILITER(S): 9 INJECTION, SOLUTION INTRAVENOUS at 15:41

## 2017-05-07 RX ADMIN — Medication 1 TABLET(S): at 11:36

## 2017-05-07 RX ADMIN — OXYCODONE HYDROCHLORIDE 10 MILLIGRAM(S): 5 TABLET ORAL at 06:39

## 2017-05-07 RX ADMIN — OXYCODONE HYDROCHLORIDE 10 MILLIGRAM(S): 5 TABLET ORAL at 13:29

## 2017-05-07 RX ADMIN — OXYCODONE HYDROCHLORIDE 10 MILLIGRAM(S): 5 TABLET ORAL at 06:01

## 2017-05-07 RX ADMIN — OXYCODONE HYDROCHLORIDE 10 MILLIGRAM(S): 5 TABLET ORAL at 14:20

## 2017-05-07 RX ADMIN — OXYCODONE HYDROCHLORIDE 10 MILLIGRAM(S): 5 TABLET ORAL at 21:58

## 2017-05-07 RX ADMIN — CEFTRIAXONE 100 GRAM(S): 500 INJECTION, POWDER, FOR SOLUTION INTRAMUSCULAR; INTRAVENOUS at 15:41

## 2017-05-07 RX ADMIN — Medication 20 MILLIGRAM(S): at 11:37

## 2017-05-07 RX ADMIN — SODIUM CHLORIDE 100 MILLILITER(S): 9 INJECTION, SOLUTION INTRAVENOUS at 06:40

## 2017-05-07 NOTE — PROGRESS NOTE ADULT - SUBJECTIVE AND OBJECTIVE BOX
Patient is a 51y old  Female who presents with a chief complaint of Weakness, jaundice (06 May 2017 15:13)    HPI:  Patient is 50yo female with PMHx of Barretts esophagus, Endometriosis, HLD, HTN. Rectovaginal fistula, s/p recent laparoscopic examination of the abd which demonstrated endometriosis on 4/24 presents with weakness and jaundice. Pt reports on Wed, she started feeling weak, fatigued, decreased energy and noted yellowing of her skin and eyes. Pt denies fever chills, cough, chest pain, melena, dark stools. Endorses new onset SOB, without CP/palpitations. No other constitutional symptoms. Upon arrival to the ER HH noted to be 8, baseline 13, 2 weeks ago. (06 May 2017 15:13)    5/7: Pt seen and examined. States she feels a generalized weakness and feels the same as yesterday. States her jaundice has gotten better since admission.    ROS:   All 10 systems reviewed and found to be negative with the exception of what has been described above.    MEDICATIONS  (STANDING):  oxyCODONE IR 10milliGRAM(s) Oral three times a day  FLUoxetine 20milliGRAM(s) Oral daily  multivitamin 1Tablet(s) Oral daily  cefTRIAXone   IVPB 1Gram(s) IV Intermittent every 24 hours  dextrose 5% + sodium chloride 0.45%. 1000milliLiter(s) IV Continuous <Continuous>    MEDICATIONS  (PRN):  diazepam    Tablet 5milliGRAM(s) Oral four times a day PRN anxiety  ondansetron Injectable 4milliGRAM(s) IV Push every 6 hours PRN Nausea  zaleplon 5milliGRAM(s) Oral at bedtime PRN Insomnia  morphine  - Injectable 2milliGRAM(s) IV Push every 3 hours PRN Moderate Pain (4 - 6)    Vital Signs Last 24 Hrs  T(C): 36.7, Max: 37.2 (05-06 @ 22:00)  T(F): 98.1, Max: 99 (05-06 @ 22:00)  HR: 85 (80 - 85)  BP: 111/45 (90/53 - 121/59)  BP(mean): --  RR: 16 (16 - 18)  SpO2: 98% (93% - 98%)    Physical Exam  Constitutional:  Lying in bed, NAD  HEENT: EOMI, PERRL  Neck: supple   Lungs: Clear to auscultation b/l. No rales, rhonchi or wheezing.  CV: s1s2 normal. No murmurs, gallops or rubs  Gastrointestinal:  Nondistended, BS normal, soft and mildly tender to palpation in all 4 quadrants. No rebound or guarding.  MS: full ROM in all extremities  Ext: Non edematous   Psychiatric:  Mood and affect are normal    Labs                      8.3    13.1  )-----------( 259      ( 07 May 2017 05:22 )             24.0     05-07    142  |  108  |  10  ----------------------------<  115<H>  3.9   |  28  |  0.89    Ca    8.4<L>      07 May 2017 05:22  Mg     2.3     05-06    TPro  6.3  /  Alb  3.3  /  TBili  1.9<H>  /  DBili  0.3<H>  /  AST  43<H>  /  ALT  32  /  AlkPhos  84  05-07    Lactate Dehydrogenase, Serum (05.06.17 @ 13:13)    Lactate Dehydrogenase, Serum: 769 U/L    Reticulocyte Count (05.06.17 @ 10:30)    RBC Count: 2.35 M/uL    Reticulocyte Percent: 12.6 %    Absolute Reticulocytes: 296.6 K/uL      Urinalysis (05.06.17 @ 12:15)    Blood, Urine: Trace    Glucose Qualitative, Urine: Negative mg/dL    pH Urine: 6.0: Please Note: New Reference Range as of 4/18/17    Color: Yellow    Urine Appearance: Slightly Turbid    Bilirubin: Negative    Ketone - Urine: Negative    Specific Gravity: 1.005    Protein, Urine: Negative mg/dL    Urobilinogen: Negative mg/dL    Nitrite: Negative    Leukocyte Esterase Concentration: Moderate    EXAM:  CHEST SINGLE VIEW FRONTAL                        PROCEDURE DATE:  05/07/2017    INTERPRETATION:  CHEST SINGLE VIEW FRONTAL  Single AP view  HISTORY:  weakness  Comparison:  none.  The cardiac silhouette is within normal limits. The lungs are clear. No   pleural abnormality.  IMPRESSION: Normal AP chest      Assessment: 50yo female with weakness and jaundice, admitted for anemia    # Anemia - likley hemolytic process  - HH=13, on 4/24, in ED 8 (received 1 unit PRBC), 8.6 after transfusion and 8.3 this morning  - guiac neg, denies melena  - currently afebrile, HD stable, comfortable on room air  - labs demonstrate an elevated TBilli, with DB 0.5, likely signifying a hemolytic process  - LDH increased: 769, retic percent increased: 12.6%  - hematology consult  - GI consult - requests dr navarro   - hold BP meds  - hold heparin    # HTN  - hold BP meds for now    # HLD  - patient not on any statins    # leukocytosis  - afebrile, HD stable, has +UA  - treat UTI  - f/u blood cultures and urine culture      # UTI  - Rocephin

## 2017-05-07 NOTE — PROGRESS NOTE ADULT - ASSESSMENT
Dx s/p exploration for vaginal fistula  COntinue diet  Gi consult for hyprbilrubinemia.  No surgical intervention required at this time.

## 2017-05-07 NOTE — PROGRESS NOTE ADULT - SUBJECTIVE AND OBJECTIVE BOX
HPI:  Patient is 50yo female with PMHx of Barretts esophagus, Endometriosis, HLD, HTN. Rectovaginal fistula, s/p recent laparoscopic examination of the abd which demonstrated endometriosis on  presents with weakness and jaundice. Pt reports on Wed, she started feeling weak, fatigued, decreased energy and noted yellowing of her skin and eyes. Pt denies fever chills, cough, chest pain, melena, dark stools. Endorses new onset SOB, without CP/palpitations. No other constitutional symptoms. Upon arrival to the ER HH noted to be 8, baseline 13, 2 weeks ago. (06 May 2017 15:13)  Patient complains of continued abdominal pain in epigastrium and suprapubic.  She has nausea, but she has no vomiting.  SHe tolerated regular diet.  Patient has a small soft stool and a lot of flatus.  No discharge from the vagina.      PAST MEDICAL & SURGICAL HISTORY:  Hormone replacement therapy  Constipation  Thyroid cyst  Chronic UTI  Obesity  Rectovaginal fistula  Pudendal neuralgia  Pelvic floor dysfunction  Incontinence of feces, unspecified fecal incontinence type  Hepatic adenoma: History of. Surgically removed   Gall bladder stones: History of. Gall bladder removed  Hemorrhoids, unspecified hemorrhoid type  Injury to sacral nerve root, subsequent encounter  Rectocele: History of   Irritable bowel syndrome with both constipation and diarrhea  Acid reflux  Stress incontinence, female  Endometriosis: Hysterectomy  UTI (urinary tract infection): frequent  Depression  Hyperlipidemia  Hypertension  Ahn esophagus  History of rectal surgery: interstimulator for rectal incontinence  later removed 2016  S/P colonoscopy: multiple  S/P foot surgery, left  S/P LASIK surgery of both eyes:   Aplasia ossea microplastica  Traumatic rupture of plantar fascia of left foot, initial encounter: surgery  Tendon rupture, post-op: repaired right elbow   S/P appendectomy:   S/P tonsillectomy:   Hepatic adenoma: resected   S/P Nissen fundoplication (with gastrostomy tube placement): for barrets esophagus   S/P breast augmentation: reduction   Stress incontinence, female: right side of mesh &quot;cut&quot; adjusted  S/P hysterectomy with oophorectomy: with mesh for stress incontinence  S/P exploratory laparotomy: cholecystectomy, ROZINA, endometriosis      REVIEW OF SYSTEMS      General:  Patient feels stronger	    Gastrointestinal: abdominal pain and nausea	    Genitourinary:  No vaginal discharge    All other ROS are negative	    MEDICATIONS  (STANDING):  oxyCODONE IR 10milliGRAM(s) Oral three times a day  FLUoxetine 20milliGRAM(s) Oral daily  multivitamin 1Tablet(s) Oral daily  cefTRIAXone   IVPB 1Gram(s) IV Intermittent every 24 hours  dextrose 5% + sodium chloride 0.45%. 1000milliLiter(s) IV Continuous <Continuous>    MEDICATIONS  (PRN):  diazepam    Tablet 5milliGRAM(s) Oral four times a day PRN anxiety  ondansetron Injectable 4milliGRAM(s) IV Push every 6 hours PRN Nausea  zaleplon 5milliGRAM(s) Oral at bedtime PRN Insomnia  morphine  - Injectable 2milliGRAM(s) IV Push every 3 hours PRN Moderate Pain (4 - 6)      Allergies    sulfa drugs (Anaphylaxis)    Intolerances        SOCIAL HISTORY:    FAMILY HISTORY:  Family history of uterine cancer (Mother): mother  Family history of hypertension (Father, Mother): mother and father  Family history of heart attack (Father): Father      Vital Signs Last 24 Hrs  T(C): 37.1, Max: 37.2 ( @ 22:00)  T(F): 98.8, Max: 99 ( @ 22:00)  HR: 80 (80 - 89)  BP: 102/58 (90/53 - 125/60)  BP(mean): --  RR: 17 (13 - 20)  SpO2: 96% (93% - 100%)    PHYSICAL EXAM:      Constitutional:  Well nourished in NAD    Gastrointestinal:  soft mild tenderness lower quadrants,nondistended    Psychiatric:  Mood and affect are normal        LABS:                        8.6    15.9  )-----------( 241      ( 06 May 2017 19:34 )             23.0         141  |  105  |  14  ----------------------------<  103<H>  3.8   |  26  |  0.92    Ca    9.1      06 May 2017 10:29  Mg     2.3         TPro  7.2  /  Alb  3.8  /  TBili  3.2<H>  /  DBili  0.5<H>  /  AST  44<H>  /  ALT  32  /  AlkPhos  99      PT/INR - ( 06 May 2017 10:29 )   PT: 12.0 sec;   INR: 1.11 ratio         PTT - ( 06 May 2017 10:29 )  PTT:31.0 sec  Urinalysis Basic - ( 06 May 2017 12:15 )    Color: Yellow / Appearance: Slightly Turbid / S.005 / pH: x  Gluc: x / Ketone: Negative  / Bili: Negative / Urobili: Negative mg/dL   Blood: x / Protein: Negative mg/dL / Nitrite: Negative   Leuk Esterase: Moderate / RBC: 3-5 /HPF / WBC 11-25   Sq Epi: x / Non Sq Epi: Few / Bacteria: Few

## 2017-05-08 LAB
ADD ON TEST-SPECIMEN IN LAB: SIGNIFICANT CHANGE UP
ALBUMIN SERPL ELPH-MCNC: 3.2 G/DL — LOW (ref 3.3–5)
ALP SERPL-CCNC: 87 U/L — SIGNIFICANT CHANGE UP (ref 40–120)
ALT FLD-CCNC: 30 U/L — SIGNIFICANT CHANGE UP (ref 12–78)
ANION GAP SERPL CALC-SCNC: 8 MMOL/L — SIGNIFICANT CHANGE UP (ref 5–17)
AST SERPL-CCNC: 30 U/L — SIGNIFICANT CHANGE UP (ref 15–37)
BILIRUB SERPL-MCNC: 1.6 MG/DL — HIGH (ref 0.2–1.2)
BUN SERPL-MCNC: 9 MG/DL — SIGNIFICANT CHANGE UP (ref 7–23)
CALCIUM SERPL-MCNC: 8.4 MG/DL — LOW (ref 8.5–10.1)
CHLORIDE SERPL-SCNC: 110 MMOL/L — HIGH (ref 96–108)
CO2 SERPL-SCNC: 27 MMOL/L — SIGNIFICANT CHANGE UP (ref 22–31)
CREAT SERPL-MCNC: 0.8 MG/DL — SIGNIFICANT CHANGE UP (ref 0.5–1.3)
CULTURE RESULTS: SIGNIFICANT CHANGE UP
GLUCOSE SERPL-MCNC: 105 MG/DL — HIGH (ref 70–99)
HAPTOGLOB SERPL-MCNC: <20 MG/DL — LOW (ref 34–200)
HAV IGM SER-ACNC: SIGNIFICANT CHANGE UP
HBV CORE IGM SER-ACNC: SIGNIFICANT CHANGE UP
HBV SURFACE AG SER-ACNC: SIGNIFICANT CHANGE UP
HCT VFR BLD CALC: 23.3 % — LOW (ref 34.5–45)
HCV AB S/CO SERPL IA: 0.14 S/CO — SIGNIFICANT CHANGE UP
HCV AB SERPL-IMP: SIGNIFICANT CHANGE UP
HGB BLD-MCNC: 7.9 G/DL — LOW (ref 11.5–15.5)
LDH SERPL L TO P-CCNC: 669 U/L — HIGH (ref 50–242)
MCHC RBC-ENTMCNC: 33.1 PG — SIGNIFICANT CHANGE UP (ref 27–34)
MCHC RBC-ENTMCNC: 33.8 GM/DL — SIGNIFICANT CHANGE UP (ref 32–36)
MCV RBC AUTO: 97.9 FL — SIGNIFICANT CHANGE UP (ref 80–100)
PLATELET # BLD AUTO: 248 K/UL — SIGNIFICANT CHANGE UP (ref 150–400)
POTASSIUM SERPL-MCNC: 3.7 MMOL/L — SIGNIFICANT CHANGE UP (ref 3.5–5.3)
POTASSIUM SERPL-SCNC: 3.7 MMOL/L — SIGNIFICANT CHANGE UP (ref 3.5–5.3)
PROT SERPL-MCNC: 6.1 GM/DL — SIGNIFICANT CHANGE UP (ref 6–8.3)
RBC # BLD: 2.38 M/UL — LOW (ref 3.8–5.2)
RBC # FLD: 28.3 % — HIGH (ref 10.3–14.5)
SODIUM SERPL-SCNC: 145 MMOL/L — SIGNIFICANT CHANGE UP (ref 135–145)
SPECIMEN SOURCE: SIGNIFICANT CHANGE UP
WBC # BLD: 11.7 K/UL — HIGH (ref 3.8–10.5)
WBC # FLD AUTO: 11.7 K/UL — HIGH (ref 3.8–10.5)

## 2017-05-08 RX ORDER — PREGABALIN 225 MG/1
500 CAPSULE ORAL DAILY
Qty: 0 | Refills: 0 | Status: DISCONTINUED | OUTPATIENT
Start: 2017-05-08 | End: 2017-05-12

## 2017-05-08 RX ORDER — HYDROCORTISONE 1 %
1 OINTMENT (GRAM) TOPICAL
Qty: 0 | Refills: 0 | Status: DISCONTINUED | OUTPATIENT
Start: 2017-05-08 | End: 2017-05-12

## 2017-05-08 RX ORDER — FOLIC ACID 0.8 MG
1 TABLET ORAL DAILY
Qty: 0 | Refills: 0 | Status: DISCONTINUED | OUTPATIENT
Start: 2017-05-08 | End: 2017-05-12

## 2017-05-08 RX ADMIN — Medication 20 MILLIGRAM(S): at 11:06

## 2017-05-08 RX ADMIN — OXYCODONE HYDROCHLORIDE 10 MILLIGRAM(S): 5 TABLET ORAL at 23:00

## 2017-05-08 RX ADMIN — Medication 5 MILLIGRAM(S): at 00:00

## 2017-05-08 RX ADMIN — CEFTRIAXONE 100 GRAM(S): 500 INJECTION, POWDER, FOR SOLUTION INTRAMUSCULAR; INTRAVENOUS at 14:05

## 2017-05-08 RX ADMIN — PREGABALIN 500 MICROGRAM(S): 225 CAPSULE ORAL at 22:33

## 2017-05-08 RX ADMIN — SODIUM CHLORIDE 100 MILLILITER(S): 9 INJECTION, SOLUTION INTRAVENOUS at 05:48

## 2017-05-08 RX ADMIN — OXYCODONE HYDROCHLORIDE 10 MILLIGRAM(S): 5 TABLET ORAL at 05:47

## 2017-05-08 RX ADMIN — Medication 1 TABLET(S): at 11:06

## 2017-05-08 RX ADMIN — OXYCODONE HYDROCHLORIDE 10 MILLIGRAM(S): 5 TABLET ORAL at 13:53

## 2017-05-08 RX ADMIN — Medication 1 MILLIGRAM(S): at 22:33

## 2017-05-08 RX ADMIN — OXYCODONE HYDROCHLORIDE 10 MILLIGRAM(S): 5 TABLET ORAL at 06:17

## 2017-05-08 RX ADMIN — SODIUM CHLORIDE 100 MILLILITER(S): 9 INJECTION, SOLUTION INTRAVENOUS at 00:00

## 2017-05-08 RX ADMIN — OXYCODONE HYDROCHLORIDE 10 MILLIGRAM(S): 5 TABLET ORAL at 14:00

## 2017-05-08 RX ADMIN — OXYCODONE HYDROCHLORIDE 10 MILLIGRAM(S): 5 TABLET ORAL at 22:32

## 2017-05-08 NOTE — PROGRESS NOTE ADULT - SUBJECTIVE AND OBJECTIVE BOX
Patient is a 51y old  Female who presents with a chief complaint of Weakness, jaundice (06 May 2017 15:13)    HPI:  Patient is 52yo female with PMHx of Barretts esophagus, Endometriosis, HLD, HTN. Rectovaginal fistula, s/p recent laparoscopic examination of the abd which demonstrated endometriosis on 4/24 presents with weakness and jaundice. Pt reports on Wed, she started feeling weak, fatigued, decreased energy and noted yellowing of her skin and eyes. Pt denies fever chills, cough, chest pain, melena, dark stools. Endorses new onset SOB, without CP/palpitations. No other constitutional symptoms. Upon arrival to the ER HH noted to be 8, baseline 13, 2 weeks ago. (06 May 2017 15:13)    5/7: Pt seen and examined. States she feels a generalized weakness and feels the same as yesterday. States her jaundice has gotten better since admission.  5/8: Pt seen and examined. States she still feels a generalized weakness and feels dizzy when she stands. She passed a large, soft orange stool last night which helped relieve some of her abdominal pain.    ROS:   All 10 systems reviewed and found to be negative with the exception of what has been described above.    MEDICATIONS  (STANDING):  oxyCODONE IR 10milliGRAM(s) Oral three times a day  FLUoxetine 20milliGRAM(s) Oral daily  multivitamin 1Tablet(s) Oral daily  cefTRIAXone   IVPB 1Gram(s) IV Intermittent every 24 hours  dextrose 5% + sodium chloride 0.45%. 1000milliLiter(s) IV Continuous <Continuous>  hydrocortisone 2.5% Rectal Cream 1Application(s) Rectal two times a day    MEDICATIONS  (PRN):  diazepam    Tablet 5milliGRAM(s) Oral four times a day PRN anxiety  ondansetron Injectable 4milliGRAM(s) IV Push every 6 hours PRN Nausea  zaleplon 5milliGRAM(s) Oral at bedtime PRN Insomnia  morphine  - Injectable 2milliGRAM(s) IV Push every 3 hours PRN Moderate Pain (4 - 6)    Vital Signs Last 24 Hrs  T(C): 36.6, Max: 36.8 (05-07 @ 21:00)  T(F): 97.9, Max: 98.2 (05-07 @ 21:00)  HR: 83 (83 - 84)  BP: 107/62 (104/55 - 111/48)  BP(mean): --  RR: 16 (15 - 16)  SpO2: 100% (93% - 100%)    Physical Exam  Constitutional:  Lying in bed, NAD  HEENT: EOMI, PERRL  Neck: supple   Lungs: Clear to auscultation b/l. No rales, rhonchi or wheezing.  CV: s1s2 normal. No murmurs, gallops or rubs  Gastrointestinal:  Distended, BS normal, soft and mildly tender to palpation in all 4 quadrants. No rebound or guarding.  MS: full ROM in all extremities  Ext: Non edematous   Psychiatric:  Mood and affect are normal    Labs                          7.9    11.7  )-----------( 248      ( 08 May 2017 05:59 )             23.3                       8.3    13.1  )-----------( 259      ( 07 May 2017 05:22 )             24.0     05-08    145  |  110<H>  |  9   ----------------------------<  105<H>  3.7   |  27  |  0.80    Ca    8.4<L>      08 May 2017 05:59    TPro  6.1  /  Alb  3.2<L>  /  TBili  1.6<H>  /  DBili  x   /  AST  30  /  ALT  30  /  AlkPhos  87  05-08 05-07    142  |  108  |  10  ----------------------------<  115<H>  3.9   |  28  |  0.89    Ca    8.4<L>      07 May 2017 05:22  Mg     2.3     05-06    TPro  6.3  /  Alb  3.3  /  TBili  1.9<H>  /  DBili  0.3<H>  /  AST  43<H>  /  ALT  32  /  AlkPhos  84  05-07    Culture - Blood (05.06.17 @ 10:37)    Specimen Source: .Blood Blood-Peripheral - only aero botlle recv'buck    Culture Results:   No growth to date.    Lactate Dehydrogenase, Serum (05.06.17 @ 13:13)    Lactate Dehydrogenase, Serum: 769 U/L    Reticulocyte Count (05.06.17 @ 10:30)    RBC Count: 2.35 M/uL    Reticulocyte Percent: 12.6 %    Absolute Reticulocytes: 296.6 K/uL      Urinalysis (05.06.17 @ 12:15)    Blood, Urine: Trace    Glucose Qualitative, Urine: Negative mg/dL    pH Urine: 6.0: Please Note: New Reference Range as of 4/18/17    Color: Yellow    Urine Appearance: Slightly Turbid    Bilirubin: Negative    Ketone - Urine: Negative    Specific Gravity: 1.005    Protein, Urine: Negative mg/dL    Urobilinogen: Negative mg/dL    Nitrite: Negative    Leukocyte Esterase Concentration: Moderate    EXAM:  CHEST SINGLE VIEW FRONTAL                        PROCEDURE DATE:  05/07/2017    INTERPRETATION:  CHEST SINGLE VIEW FRONTAL  Single AP view  HISTORY:  weakness  Comparison:  none.  The cardiac silhouette is within normal limits. The lungs are clear. No   pleural abnormality.  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  - HH=13, on 4/24, in ED 8 (received 1 unit PRBC), 8.3 yesterday and 7.9 today  - Will transfuse 1 unit PRBC   - guiac neg, denies melena  - currently afebrile, comfortable on room air  - labs demonstrate an elevated TBilli, with DB 0.5, likely signifying a hemolytic process  - Tbili trending down: 1.9 --> 1.6  - LDH trending down: 769 --> 669  -  retic percent increased: 12.6%  - hematology consult appreciated-  f/i Rosio direct and indirect and haptoglobin  - GI consult - requests dr navarro- f/u hemochromatosis gene, hepatitis serologies, Analpram for hemorrhoids  - hold BP meds  - hold heparin    # HTN  - hold BP meds for now    # HLD  - patient not on any statins    # leukocytosis  - afebrile, HD stable, has +UA  - treat UTI with ceftriaxone  - f/u blood cultures- no growth to date  - f/u urine culture      # UTI  - Rocephin

## 2017-05-08 NOTE — PROGRESS NOTE ADULT - SUBJECTIVE AND OBJECTIVE BOX
HPI:  Patient is 52yo female with PMHx of Barretts esophagus, Endometriosis, HLD, HTN. Rectovaginal fistula, s/p recent laparoscopic examination of the abd which demonstrated endometriosis on 4/24 presents with weakness and jaundice. Pt reports on Wed, she started feeling weak, fatigued, decreased energy and noted yellowing of her skin and eyes. Pt denies fever chills, cough, chest pain, melena, dark stools. Endorses new onset SOB, without CP/palpitations. No other constitutional symptoms. Upon arrival to the ER HH noted to be 8, baseline 13, 2 weeks ago. (06 May 2017 15:13)    Patient has still complaints of abdominal pain and vaginal discharge.      PAST MEDICAL & SURGICAL HISTORY:  Hormone replacement therapy  Constipation  Thyroid cyst  Chronic UTI  Obesity  Rectovaginal fistula  Pudendal neuralgia  Pelvic floor dysfunction  Incontinence of feces, unspecified fecal incontinence type  Hepatic adenoma: History of. Surgically removed 2006  Gall bladder stones: History of. Gall bladder removed  Hemorrhoids, unspecified hemorrhoid type  Injury to sacral nerve root, subsequent encounter  Rectocele: History of 2015  Irritable bowel syndrome with both constipation and diarrhea  Acid reflux  Stress incontinence, female  Endometriosis: Hysterectomy  UTI (urinary tract infection): frequent  Depression  Hyperlipidemia  Hypertension  Ahn esophagus  History of rectal surgery: interstimulator for rectal incontinence  later removed 2016  S/P colonoscopy: multiple  S/P foot surgery, left  S/P LASIK surgery of both eyes: 2000  Aplasia ossea microplastica  Traumatic rupture of plantar fascia of left foot, initial encounter: surgery  Tendon rupture, post-op: repaired right elbow 2011  S/P appendectomy: 1979  S/P tonsillectomy: 1979  Hepatic adenoma: resected 2006  S/P Nissen fundoplication (with gastrostomy tube placement): for barrets esophagus 2006  S/P breast augmentation: reduction 2003  Stress incontinence, female: right side of mesh &quot;cut&quot; adjusted  S/P hysterectomy with oophorectomy: with mesh for stress incontinence  S/P exploratory laparotomy: cholecystectomy, ROZINA, endometriosis      REVIEW OF SYSTEMS      General:	    Skin/Breast:  	  Ophthalmologic:  	  ENMT:	    Respiratory and Thorax:  	  Cardiovascular:	    Gastrointestinal:	    Genitourinary:	    Musculoskeletal:	    Neurological:	    Psychiatric:	    Hematology/Lymphatics:	    Endocrine:	    Allergic/Immunologic:	    MEDICATIONS  (STANDING):  oxyCODONE IR 10milliGRAM(s) Oral three times a day  FLUoxetine 20milliGRAM(s) Oral daily  multivitamin 1Tablet(s) Oral daily  cefTRIAXone   IVPB 1Gram(s) IV Intermittent every 24 hours  dextrose 5% + sodium chloride 0.45%. 1000milliLiter(s) IV Continuous <Continuous>  hydrocortisone 2.5% Rectal Cream 1Application(s) Rectal two times a day    MEDICATIONS  (PRN):  diazepam    Tablet 5milliGRAM(s) Oral four times a day PRN anxiety  ondansetron Injectable 4milliGRAM(s) IV Push every 6 hours PRN Nausea  zaleplon 5milliGRAM(s) Oral at bedtime PRN Insomnia  morphine  - Injectable 2milliGRAM(s) IV Push every 3 hours PRN Moderate Pain (4 - 6)      Allergies    sulfa drugs (Anaphylaxis)    Intolerances        SOCIAL HISTORY:    FAMILY HISTORY:  Family history of uterine cancer (Mother): mother  Family history of hypertension (Father, Mother): mother and father  Family history of heart attack (Father): Father      Vital Signs Last 24 Hrs  T(C): 36.7, Max: 36.8 (05-07 @ 21:00)  T(F): 98, Max: 98.2 (05-07 @ 21:00)  HR: 83 (83 - 85)  BP: 111/48 (104/55 - 111/48)  BP(mean): --  RR: 15 (15 - 16)  SpO2: 93% (93% - 98%)    PHYSICAL EXAM:      Constitutional:    Eyes:    ENMT:    Neck:    Breasts:    Back:    Respiratory:    Cardiovascular:    Gastrointestinal:  soft NT ND    Genitourinary:    Rectal:    Extremities:    Vascular:    Neurological:    Skin:    Lymph Nodes:    Musculoskeletal:    Psychiatric:        LABS:                        7.9    11.7  )-----------( 248      ( 08 May 2017 05:59 )             23.3     05-08    145  |  110<H>  |  9   ----------------------------<  105<H>  3.7   |  27  |  0.80    Ca    8.4<L>      08 May 2017 05:59    TPro  6.1  /  Alb  3.2<L>  /  TBili  1.6<H>  /  DBili  x   /  AST  30  /  ALT  30  /  AlkPhos  87  05-08          RADIOLOGY & ADDITIONAL STUDIES:

## 2017-05-08 NOTE — CONSULT NOTE ADULT - ASSESSMENT
Imp:  Presentation c/w hemolysis. Stool guaaic negative -- no significant GI bleeding    Rec:  Check hemochromatosis gene, hepatitis serologies  Analpram for hemorrhoids

## 2017-05-08 NOTE — PROGRESS NOTE ADULT - SUBJECTIVE AND OBJECTIVE BOX
patient stable , states urine less dark  Still passing fecal material through Vagina    Laboratory studies show a haptoglobin less than 20, this supports hemolysis  Sterling direct and indirect negative, argues against a immune mediated hemolytic anemia  Peripheral smear shows significant poikil and ansiocytosis(RDW 23) and various spherocytes  No significant schistocytes noted   I believe 2 problems are occurring and it is difficult to relate them.  1) patient has some slow to GI-GYN fistulous tract; it appears feces persists in discharge  from her vagina  needs followup with colorectal surgery and GYN and  possibly  service  2) patient had hemolytic anemia possibly related to an infectious process or drug  Also possible is some sort of hereditary spherocytosis, also possible is an autoimmune process which was missed with the original sterling  Plan recheck LDH,bilrubin( appears as if the process is slowing down as the bilirubin  and  LDH  are declining)  check urine for hemosiderin,  laboratory to do a"supersensitive"Sterling to rule out occult autoimmune process  check for G6PD, and sent flow to rule out mesenteric disorder such as PNH ( which I doubt)  Add folic acid and B12 as patient will need this to "retic up" and make more red cells.

## 2017-05-09 ENCOUNTER — APPOINTMENT (OUTPATIENT)
Dept: COLORECTAL SURGERY | Facility: CLINIC | Age: 52
End: 2017-05-09

## 2017-05-09 LAB
ALBUMIN SERPL ELPH-MCNC: 3.4 G/DL — SIGNIFICANT CHANGE UP (ref 3.3–5)
ALP SERPL-CCNC: 89 U/L — SIGNIFICANT CHANGE UP (ref 40–120)
ALT FLD-CCNC: 28 U/L — SIGNIFICANT CHANGE UP (ref 12–78)
ANION GAP SERPL CALC-SCNC: 10 MMOL/L — SIGNIFICANT CHANGE UP (ref 5–17)
AST SERPL-CCNC: 32 U/L — SIGNIFICANT CHANGE UP (ref 15–37)
BILIRUB DIRECT SERPL-MCNC: 0.2 MG/DL — SIGNIFICANT CHANGE UP (ref 0–0.2)
BILIRUB INDIRECT FLD-MCNC: 1.2 MG/DL — HIGH (ref 0.2–1)
BILIRUB SERPL-MCNC: 1.4 MG/DL — HIGH (ref 0.2–1.2)
BILIRUB SERPL-MCNC: 1.4 MG/DL — HIGH (ref 0.2–1.2)
BUN SERPL-MCNC: 8 MG/DL — SIGNIFICANT CHANGE UP (ref 7–23)
CALCIUM SERPL-MCNC: 8.6 MG/DL — SIGNIFICANT CHANGE UP (ref 8.5–10.1)
CHLORIDE SERPL-SCNC: 106 MMOL/L — SIGNIFICANT CHANGE UP (ref 96–108)
CO2 SERPL-SCNC: 27 MMOL/L — SIGNIFICANT CHANGE UP (ref 22–31)
CREAT SERPL-MCNC: 0.83 MG/DL — SIGNIFICANT CHANGE UP (ref 0.5–1.3)
CRP SERPL-MCNC: 0.6 MG/DL — HIGH (ref 0–0.4)
ERYTHROCYTE [SEDIMENTATION RATE] IN BLOOD: 25 MM/HR — HIGH (ref 0–20)
GLUCOSE SERPL-MCNC: 103 MG/DL — HIGH (ref 70–99)
HCT VFR BLD CALC: 27.5 % — LOW (ref 34.5–45)
HGB BLD-MCNC: 9.6 G/DL — LOW (ref 11.5–15.5)
LDH SERPL L TO P-CCNC: 644 U/L — HIGH (ref 50–242)
MCHC RBC-ENTMCNC: 34.6 PG — HIGH (ref 27–34)
MCHC RBC-ENTMCNC: 34.8 GM/DL — SIGNIFICANT CHANGE UP (ref 32–36)
MCV RBC AUTO: 99.3 FL — SIGNIFICANT CHANGE UP (ref 80–100)
PLATELET # BLD AUTO: 247 K/UL — SIGNIFICANT CHANGE UP (ref 150–400)
POTASSIUM SERPL-MCNC: 3.7 MMOL/L — SIGNIFICANT CHANGE UP (ref 3.5–5.3)
POTASSIUM SERPL-SCNC: 3.7 MMOL/L — SIGNIFICANT CHANGE UP (ref 3.5–5.3)
PROT SERPL-MCNC: 6.4 GM/DL — SIGNIFICANT CHANGE UP (ref 6–8.3)
RBC # BLD: 2.75 M/UL — LOW (ref 3.8–5.2)
RBC # BLD: 2.77 M/UL — LOW (ref 3.8–5.2)
RBC # FLD: 24.9 % — HIGH (ref 10.3–14.5)
RETICS #: 461.5 K/UL — HIGH (ref 25–125)
RETICS/RBC NFR: 16.8 % — HIGH (ref 0.5–2.5)
SODIUM SERPL-SCNC: 143 MMOL/L — SIGNIFICANT CHANGE UP (ref 135–145)
WBC # BLD: 9.4 K/UL — SIGNIFICANT CHANGE UP (ref 3.8–10.5)
WBC # FLD AUTO: 9.4 K/UL — SIGNIFICANT CHANGE UP (ref 3.8–10.5)

## 2017-05-09 RX ORDER — SODIUM FERRIC GLUCONAT/SUCROSE 62.5MG/5ML
125 AMPUL (ML) INTRAVENOUS AT BEDTIME
Qty: 0 | Refills: 0 | Status: DISCONTINUED | OUTPATIENT
Start: 2017-05-09 | End: 2017-05-12

## 2017-05-09 RX ADMIN — SODIUM CHLORIDE 100 MILLILITER(S): 9 INJECTION, SOLUTION INTRAVENOUS at 23:23

## 2017-05-09 RX ADMIN — Medication 135 MILLIGRAM(S): at 13:22

## 2017-05-09 RX ADMIN — Medication 5 MILLIGRAM(S): at 02:03

## 2017-05-09 RX ADMIN — SODIUM CHLORIDE 100 MILLILITER(S): 9 INJECTION, SOLUTION INTRAVENOUS at 02:01

## 2017-05-09 RX ADMIN — SODIUM CHLORIDE 100 MILLILITER(S): 9 INJECTION, SOLUTION INTRAVENOUS at 12:10

## 2017-05-09 RX ADMIN — OXYCODONE HYDROCHLORIDE 10 MILLIGRAM(S): 5 TABLET ORAL at 15:38

## 2017-05-09 RX ADMIN — OXYCODONE HYDROCHLORIDE 10 MILLIGRAM(S): 5 TABLET ORAL at 05:50

## 2017-05-09 RX ADMIN — PREGABALIN 500 MICROGRAM(S): 225 CAPSULE ORAL at 12:16

## 2017-05-09 RX ADMIN — Medication 1 APPLICATION(S): at 05:50

## 2017-05-09 RX ADMIN — Medication 20 MILLIGRAM(S): at 12:16

## 2017-05-09 RX ADMIN — Medication 1 MILLIGRAM(S): at 12:16

## 2017-05-09 RX ADMIN — Medication 1 TABLET(S): at 12:16

## 2017-05-09 RX ADMIN — OXYCODONE HYDROCHLORIDE 10 MILLIGRAM(S): 5 TABLET ORAL at 13:22

## 2017-05-09 RX ADMIN — CEFTRIAXONE 100 GRAM(S): 500 INJECTION, POWDER, FOR SOLUTION INTRAMUSCULAR; INTRAVENOUS at 16:26

## 2017-05-09 NOTE — PROGRESS NOTE ADULT - SUBJECTIVE AND OBJECTIVE BOX
HPI:  Patient is 52yo female with PMHx of Barretts esophagus, Endometriosis, HLD, HTN. Rectovaginal fistula, s/p recent laparoscopic examination of the abd which demonstrated endometriosis on 4/24 presents with weakness and jaundice. Pt reports on Wed, she started feeling weak, fatigued, decreased energy and noted yellowing of her skin and eyes. Pt denies fever chills, cough, chest pain, melena, dark stools. Endorses new onset SOB, without CP/palpitations. No other constitutional symptoms. Upon arrival to the ER HH noted to be 8, baseline 13, 2 weeks ago. (06 May 2017 15:13)    Patient has complaints of mild abdominal pain.  Patient complains of stool discharge from the vagina.  Patient is tolerating diet.      PAST MEDICAL & SURGICAL HISTORY:  Hormone replacement therapy  Constipation  Thyroid cyst  Chronic UTI  Obesity  Rectovaginal fistula  Pudendal neuralgia  Pelvic floor dysfunction  Incontinence of feces, unspecified fecal incontinence type  Hepatic adenoma: History of. Surgically removed 2006  Gall bladder stones: History of. Gall bladder removed  Hemorrhoids, unspecified hemorrhoid type  Injury to sacral nerve root, subsequent encounter  Rectocele: History of 2015  Irritable bowel syndrome with both constipation and diarrhea  Acid reflux  Stress incontinence, female  Endometriosis: Hysterectomy  UTI (urinary tract infection): frequent  Depression  Hyperlipidemia  Hypertension  Ahn esophagus  History of rectal surgery: interstimulator for rectal incontinence  later removed 2016  S/P colonoscopy: multiple  S/P foot surgery, left  S/P LASIK surgery of both eyes: 2000  Aplasia ossea microplastica  Traumatic rupture of plantar fascia of left foot, initial encounter: surgery  Tendon rupture, post-op: repaired right elbow 2011  S/P appendectomy: 1979  S/P tonsillectomy: 1979  Hepatic adenoma: resected 2006  S/P Nissen fundoplication (with gastrostomy tube placement): for barrets esophagus 2006  S/P breast augmentation: reduction 2003  Stress incontinence, female: right side of mesh &quot;cut&quot; adjusted  S/P hysterectomy with oophorectomy: with mesh for stress incontinence  S/P exploratory laparotomy: cholecystectomy, ROZINA, endometriosis      REVIEW OF SYSTEMS      General:  Patient has no generalized complaints.	    Gastrointestinal:	  abdominal pain    Genitourinary: vaginal discharge 	    All of the above otherwise negative.    MEDICATIONS  (STANDING):  oxyCODONE IR 10milliGRAM(s) Oral three times a day  FLUoxetine 20milliGRAM(s) Oral daily  multivitamin 1Tablet(s) Oral daily  cefTRIAXone   IVPB 1Gram(s) IV Intermittent every 24 hours  dextrose 5% + sodium chloride 0.45%. 1000milliLiter(s) IV Continuous <Continuous>  hydrocortisone 2.5% Rectal Cream 1Application(s) Rectal two times a day  folic acid 1milliGRAM(s) Oral daily  cyanocobalamin 500MICROGram(s) Oral daily  sodium ferric gluconate complex IVPB 125milliGRAM(s) IV Intermittent daily    MEDICATIONS  (PRN):  diazepam    Tablet 5milliGRAM(s) Oral four times a day PRN anxiety  ondansetron Injectable 4milliGRAM(s) IV Push every 6 hours PRN Nausea  zaleplon 5milliGRAM(s) Oral at bedtime PRN Insomnia  morphine  - Injectable 2milliGRAM(s) IV Push every 3 hours PRN Moderate Pain (4 - 6)      Allergies    sulfa drugs (Anaphylaxis)    Intolerances        SOCIAL HISTORY:    FAMILY HISTORY:  Family history of uterine cancer (Mother): mother  Family history of hypertension (Father, Mother): mother and father  Family history of heart attack (Father): Father      Vital Signs Last 24 Hrs  T(C): 36.7, Max: 36.9 (05-08 @ 16:06)  T(F): 98, Max: 98.5 (05-08 @ 20:12)  HR: 75 (75 - 83)  BP: 109/58 (107/62 - 137/48)  BP(mean): --  RR: 18 (16 - 19)  SpO2: 98% (97% - 100%)    PHYSICAL EXAM:      Constitutional:  well nourished in NAD    Gastrointestinal:  soft nt nd    Psychiatric:  Mood and affect are normal        LABS:                        9.6    9.4   )-----------( 247      ( 09 May 2017 05:36 )             27.5     05-09    143  |  106  |  8   ----------------------------<  103<H>  3.7   |  27  |  0.83    Ca    8.6      09 May 2017 05:36    TPro  6.4  /  Alb  3.4  /  TBili  1.4<H>  /  DBili  0.2  /  AST  32  /  ALT  28  /  AlkPhos  89  05-09

## 2017-05-09 NOTE — PROGRESS NOTE ADULT - SUBJECTIVE AND OBJECTIVE BOX
Patient is a 51y old  Female who presents with a chief complaint of Weakness, jaundice (06 May 2017 15:13)      Subective:  Still with stool from vagina    PAST MEDICAL & SURGICAL HISTORY:  Hormone replacement therapy  Constipation  Thyroid cyst  Chronic UTI  Obesity  Rectovaginal fistula  Pudendal neuralgia  Pelvic floor dysfunction  Incontinence of feces, unspecified fecal incontinence type  Hepatic adenoma: History of. Surgically removed 2006  Gall bladder stones: History of. Gall bladder removed  Hemorrhoids, unspecified hemorrhoid type  Injury to sacral nerve root, subsequent encounter  Rectocele: History of 2015  Irritable bowel syndrome with both constipation and diarrhea  Acid reflux  Stress incontinence, female  Endometriosis: Hysterectomy  UTI (urinary tract infection): frequent  Depression  Hyperlipidemia  Hypertension  Ahn esophagus  History of rectal surgery: interstimulator for rectal incontinence  later removed 2016  S/P colonoscopy: multiple  S/P foot surgery, left  S/P LASIK surgery of both eyes: 2000  Aplasia ossea microplastica  Traumatic rupture of plantar fascia of left foot, initial encounter: surgery  Tendon rupture, post-op: repaired right elbow 2011  S/P appendectomy: 1979  S/P tonsillectomy: 1979  Hepatic adenoma: resected 2006  S/P Nissen fundoplication (with gastrostomy tube placement): for barrets esophagus 2006  S/P breast augmentation: reduction 2003  Stress incontinence, female: right side of mesh &quot;cut&quot; adjusted  S/P hysterectomy with oophorectomy: with mesh for stress incontinence  S/P exploratory laparotomy: cholecystectomy, ROZINA, endometriosis      MEDICATIONS  (STANDING):  oxyCODONE IR 10milliGRAM(s) Oral three times a day  FLUoxetine 20milliGRAM(s) Oral daily  multivitamin 1Tablet(s) Oral daily  cefTRIAXone   IVPB 1Gram(s) IV Intermittent every 24 hours  dextrose 5% + sodium chloride 0.45%. 1000milliLiter(s) IV Continuous <Continuous>  hydrocortisone 2.5% Rectal Cream 1Application(s) Rectal two times a day  folic acid 1milliGRAM(s) Oral daily  cyanocobalamin 500MICROGram(s) Oral daily  sodium ferric gluconate complex IVPB 125milliGRAM(s) IV Intermittent daily    MEDICATIONS  (PRN):  diazepam    Tablet 5milliGRAM(s) Oral four times a day PRN anxiety  ondansetron Injectable 4milliGRAM(s) IV Push every 6 hours PRN Nausea  zaleplon 5milliGRAM(s) Oral at bedtime PRN Insomnia  morphine  - Injectable 2milliGRAM(s) IV Push every 3 hours PRN Moderate Pain (4 - 6)      REVIEW OF SYSTEMS:    RESPIRATORY: No shortness of breath  CARDIOVASCULAR: No chest pain  All other review of systems is negative unless indicated above.    Vital Signs Last 24 Hrs  T(C): 36.7, Max: 36.9 (05-08 @ 16:06)  T(F): 98, Max: 98.5 (05-08 @ 20:12)  HR: 75 (75 - 83)  BP: 109/58 (107/62 - 137/48)  BP(mean): --  RR: 18 (16 - 19)  SpO2: 98% (97% - 100%)    PHYSICAL EXAM:    Constitutional: NAD, well-developed  Respiratory: CTAB  Cardiovascular: S1 and S2, RRR  Gastrointestinal: BS+, soft, NT/ND  Extremities: No peripheral edema  Psychiatric: Normal mood, normal affect    LABS:                        9.6    9.4   )-----------( 247      ( 09 May 2017 05:36 )             27.5     05-09    143  |  106  |  8   ----------------------------<  103<H>  3.7   |  27  |  0.83    Ca    8.6      09 May 2017 05:36    TPro  6.4  /  Alb  3.4  /  TBili  1.4<H>  /  DBili  0.2  /  AST  32  /  ALT  28  /  AlkPhos  89  05-09      LIVER FUNCTIONS - ( 09 May 2017 05:36 )  Alb: 3.4 g/dL / Pro: 6.4 gm/dL / ALK PHOS: 89 U/L / ALT: 28 U/L / AST: 32 U/L / GGT: x             RADIOLOGY & ADDITIONAL STUDIES:

## 2017-05-09 NOTE — PROGRESS NOTE ADULT - ASSESSMENT
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 Silveira regarding flex sig to re-attempt to localize fistula  Await additional heme eval ordered by Dr. Cutler

## 2017-05-09 NOTE — PROGRESS NOTE ADULT - SUBJECTIVE AND OBJECTIVE BOX
Patient is a 51y old  Female who presents with a chief complaint of Weakness, jaundice (06 May 2017 15:13)    HPI:  Patient is 52yo female with PMHx of Barretts esophagus, Endometriosis, HLD, HTN. Rectovaginal fistula, s/p recent laparoscopic examination of the abd which demonstrated endometriosis on 4/24 presents with weakness and jaundice. Pt reports on Wed, she started feeling weak, fatigued, decreased energy and noted yellowing of her skin and eyes. Pt denies fever chills, cough, chest pain, melena, dark stools. Endorses new onset SOB, without CP/palpitations. No other constitutional symptoms. Upon arrival to the ER HH noted to be 8, baseline 13, 2 weeks ago. (06 May 2017 15:13)    5/7: Pt seen and examined. States she feels a generalized weakness and feels the same as yesterday. States her jaundice has gotten better since admission.  5/8: Pt seen and examined. States she still feels a generalized weakness and feels dizzy when she stands. She passed a large, soft orange stool last night which helped relieve some of her abdominal pain.  5/9: States she feels less weak than yesterday. She is still having feces in her vaginal discharge.    ROS:   All 10 systems reviewed and found to be negative with the exception of what has been described above.    MEDICATIONS  (STANDING):  oxyCODONE IR 10milliGRAM(s) Oral three times a day  FLUoxetine 20milliGRAM(s) Oral daily  multivitamin 1Tablet(s) Oral daily  cefTRIAXone   IVPB 1Gram(s) IV Intermittent every 24 hours  dextrose 5% + sodium chloride 0.45%. 1000milliLiter(s) IV Continuous <Continuous>  hydrocortisone 2.5% Rectal Cream 1Application(s) Rectal two times a day  folic acid 1milliGRAM(s) Oral daily  cyanocobalamin 500MICROGram(s) Oral daily  sodium ferric gluconate complex IVPB 125milliGRAM(s) IV Intermittent daily    MEDICATIONS  (PRN):  diazepam    Tablet 5milliGRAM(s) Oral four times a day PRN anxiety  ondansetron Injectable 4milliGRAM(s) IV Push every 6 hours PRN Nausea  zaleplon 5milliGRAM(s) Oral at bedtime PRN Insomnia  morphine  - Injectable 2milliGRAM(s) IV Push every 3 hours PRN Moderate Pain (4 - 6)    Vital Signs Last 24 Hrs  T(C): 36.5, Max: 36.9 (05-08 @ 16:06)  T(F): 97.7, Max: 98.5 (05-08 @ 20:12)  HR: 76 (75 - 80)  BP: 120/61 (109/58 - 137/48)  BP(mean): --  RR: 18 (18 - 19)  SpO2: 99% (97% - 99%)    Physical Exam  Constitutional:  Lying in bed, NAD  HEENT: EOMI, PERRL  Neck: supple   Lungs: Clear to auscultation b/l. No rales, rhonchi or wheezing.  CV: s1s2 normal. No murmurs, gallops or rubs  Gastrointestinal:  Distended, BS normal, soft and mildly tender to palpation in all 4 quadrants. No rebound or guarding.  MS: full ROM in all extremities  Ext: Non edematous   Psychiatric:  Mood and affect are normal    Labs                          9.6    9.4   )-----------( 247      ( 09 May 2017 05:36 )             27.5                         7.9    11.7  )-----------( 248      ( 08 May 2017 05:59 )             23.3                       8.3    13.1  )-----------( 259      ( 07 May 2017 05:22 )             24.0     05-09    143  |  106  |  8   ----------------------------<  103<H>  3.7   |  27  |  0.83    Ca    8.6      09 May 2017 05:36    TPro  6.4  /  Alb  3.4  /  TBili  1.4<H>  /  DBili  0.2  /  AST  32  /  ALT  28  /  AlkPhos  89  05-09 05-08    145  |  110<H>  |  9   ----------------------------<  105<H>  3.7   |  27  |  0.80    Ca    8.4<L>      08 May 2017 05:59    TPro  6.1  /  Alb  3.2<L>  /  TBili  1.6<H>  /  DBili  x   /  AST  30  /  ALT  30  /  AlkPhos  87  05-08 05-07    142  |  108  |  10  ----------------------------<  115<H>  3.9   |  28  |  0.89    Ca    8.4<L>      07 May 2017 05:22  Mg     2.3     05-06    TPro  6.3  /  Alb  3.3  /  TBili  1.9<H>  /  DBili  0.3<H>  /  AST  43<H>  /  ALT  32  /  AlkPhos  84  05-07    C-Reactive Protein, Serum (05.09.17 @ 05:36)    C-Reactive Protein, Serum: 0.60 mg/dL    Sedimentation Rate, Erythrocyte (05.09.17 @ 05:36)    Sedimentation Rate, Erythrocyte: 25 mm/hr    Direct Rosio Profile (05.06.17 @ 10:30)    Dir Antiglob Polyspecific Interpretation: NEG    Haptoglobin, Serum (05.06.17 @ 10:30)    Haptoglobin, Serum: <20: test repeated mg/dL    Culture - Blood (05.06.17 @ 10:37)    Specimen Source: .Blood Blood-Peripheral - only aero zaid recv'd    Culture Results:   No growth to date.    Lactate Dehydrogenase, Serum (05.06.17 @ 13:13)    Lactate Dehydrogenase, Serum: 769 U/L    Reticulocyte Count (05.06.17 @ 10:30)    RBC Count: 2.35 M/uL    Reticulocyte Percent: 12.6 %    Absolute Reticulocytes: 296.6 K/uL    Urinalysis (05.06.17 @ 12:15)    Blood, Urine: Trace    Glucose Qualitative, Urine: Negative mg/dL    pH Urine: 6.0: Please Note: New Reference Range as of 4/18/17    Color: Yellow    Urine Appearance: Slightly Turbid    Bilirubin: Negative    Ketone - Urine: Negative    Specific Gravity: 1.005    Protein, Urine: Negative mg/dL    Urobilinogen: Negative mg/dL    Nitrite: Negative    Leukocyte Esterase Concentration: Moderate    Culture - Urine (05.06.17 @ 12:15)    Specimen Source: .Urine Clean Catch (Midstream)    Culture Results:   Culture grew 3 or more types of organisms which indicate  collection contamination; consider recollection only if clinically  indicated.      EXAM:  CHEST SINGLE VIEW FRONTAL                        PROCEDURE DATE:  05/07/2017    INTERPRETATION:  CHEST SINGLE VIEW FRONTAL  Single AP view  HISTORY:  weakness  Comparison:  none.  The cardiac silhouette is within normal limits. The lungs are clear. No   pleural abnormality.  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  - s/p 1 unit PRBC transfusion on admission and 1 unit PRBC yesterday 5/8  - H/H increased appropriately: 7.9 --> 9.6  - Monitor CBC, transfuse as needed  - guiac neg, denies melena  - currently afebrile, comfortable on room air  - labs demonstrate an elevated TBilli, with DB 0.5, likely signifying a hemolytic process  - Tbili trending down: 1.9 --> 1.6 --> 1.4  - LDH trending down: 769 --> 669 --> 664  -  retic percent increased: 12.6% --> 16.8%  - hematology consult appreciated-  Rosio negative and haptoglobin<20. Check for G6PD, check urine for hemosiderine, laboratory to do a "supersensitive" Rosio to rule out occult autoimmune process, flow to rule out mesenteric disorder  add folic acid and B12  - GI consult - requests dr navarro- f/u hemochromatosis gene, hepatitis serologies, Analpram for hemorrhoids  - colorectal consult appreciated- possible flex sigmoidoscopy to localize fistula  - ID consult appreciated- check for parvovirus B19, CMV, EBV serology and HIV testing as these are known to contribute to anemia  - hold BP meds  - hold heparin    # HTN  - hold BP meds for now    # HLD  - patient not on any statins    # leukocytosis  - afebrile, HD stable, has +UA  - treat UTI with ceftriaxone  - f/u blood cultures- no growth to date  - f/u urine culture- contamination       # UTI  - Rocephin

## 2017-05-10 LAB
ALBUMIN SERPL ELPH-MCNC: 3.6 G/DL — SIGNIFICANT CHANGE UP (ref 3.3–5)
ALBUMIN SERPL ELPH-MCNC: 3.7 G/DL — SIGNIFICANT CHANGE UP (ref 3.3–5)
ALP SERPL-CCNC: 96 U/L — SIGNIFICANT CHANGE UP (ref 40–120)
ALP SERPL-CCNC: 98 U/L — SIGNIFICANT CHANGE UP (ref 40–120)
ALT FLD-CCNC: 34 U/L — SIGNIFICANT CHANGE UP (ref 12–78)
ALT FLD-CCNC: 35 U/L — SIGNIFICANT CHANGE UP (ref 12–78)
ANA TITR SER: NEGATIVE — SIGNIFICANT CHANGE UP
ANION GAP SERPL CALC-SCNC: 8 MMOL/L — SIGNIFICANT CHANGE UP (ref 5–17)
ANION GAP SERPL CALC-SCNC: 9 MMOL/L — SIGNIFICANT CHANGE UP (ref 5–17)
AST SERPL-CCNC: 28 U/L — SIGNIFICANT CHANGE UP (ref 15–37)
AST SERPL-CCNC: 30 U/L — SIGNIFICANT CHANGE UP (ref 15–37)
BILIRUB SERPL-MCNC: 0.7 MG/DL — SIGNIFICANT CHANGE UP (ref 0.2–1.2)
BILIRUB SERPL-MCNC: 1.1 MG/DL — SIGNIFICANT CHANGE UP (ref 0.2–1.2)
BUN SERPL-MCNC: 10 MG/DL — SIGNIFICANT CHANGE UP (ref 7–23)
BUN SERPL-MCNC: 7 MG/DL — SIGNIFICANT CHANGE UP (ref 7–23)
CALCIUM SERPL-MCNC: 8.6 MG/DL — SIGNIFICANT CHANGE UP (ref 8.5–10.1)
CALCIUM SERPL-MCNC: 8.7 MG/DL — SIGNIFICANT CHANGE UP (ref 8.5–10.1)
CHLORIDE SERPL-SCNC: 107 MMOL/L — SIGNIFICANT CHANGE UP (ref 96–108)
CHLORIDE SERPL-SCNC: 109 MMOL/L — HIGH (ref 96–108)
CMV IGG FLD QL: <0.2 U/ML — SIGNIFICANT CHANGE UP
CMV IGG SERPL-IMP: NEGATIVE — SIGNIFICANT CHANGE UP
CMV IGM FLD-ACNC: <8 AU/ML — SIGNIFICANT CHANGE UP
CMV IGM SERPL QL: NEGATIVE — SIGNIFICANT CHANGE UP
CO2 SERPL-SCNC: 26 MMOL/L — SIGNIFICANT CHANGE UP (ref 22–31)
CO2 SERPL-SCNC: 29 MMOL/L — SIGNIFICANT CHANGE UP (ref 22–31)
CREAT SERPL-MCNC: 0.85 MG/DL — SIGNIFICANT CHANGE UP (ref 0.5–1.3)
CREAT SERPL-MCNC: 0.98 MG/DL — SIGNIFICANT CHANGE UP (ref 0.5–1.3)
EBV EA AB SER IA-ACNC: 16 U/ML — HIGH
EBV EA AB TITR SER IF: POSITIVE
EBV EA IGG SER-ACNC: POSITIVE
EBV NA IGG SER IA-ACNC: 436 U/ML — HIGH
EBV PATRN SPEC IB-IMP: SIGNIFICANT CHANGE UP
EBV VCA IGG AVIDITY SER QL IA: POSITIVE
EBV VCA IGM SER IA-ACNC: 18.8 U/ML — SIGNIFICANT CHANGE UP
EBV VCA IGM SER IA-ACNC: 430 U/ML — HIGH
EBV VCA IGM TITR FLD: NEGATIVE — SIGNIFICANT CHANGE UP
GLUCOSE SERPL-MCNC: 106 MG/DL — HIGH (ref 70–99)
GLUCOSE SERPL-MCNC: 130 MG/DL — HIGH (ref 70–99)
HCT VFR BLD CALC: 31 % — LOW (ref 34.5–45)
HGB BLD-MCNC: 10.1 G/DL — LOW (ref 11.5–15.5)
HIV 1+2 AB+HIV1 P24 AG SERPL QL IA: SIGNIFICANT CHANGE UP
MCHC RBC-ENTMCNC: 32.7 GM/DL — SIGNIFICANT CHANGE UP (ref 32–36)
MCHC RBC-ENTMCNC: 32.9 PG — SIGNIFICANT CHANGE UP (ref 27–34)
MCV RBC AUTO: 100.5 FL — HIGH (ref 80–100)
PLATELET # BLD AUTO: 239 K/UL — SIGNIFICANT CHANGE UP (ref 150–400)
POTASSIUM SERPL-MCNC: 3.6 MMOL/L — SIGNIFICANT CHANGE UP (ref 3.5–5.3)
POTASSIUM SERPL-MCNC: 3.9 MMOL/L — SIGNIFICANT CHANGE UP (ref 3.5–5.3)
POTASSIUM SERPL-SCNC: 3.6 MMOL/L — SIGNIFICANT CHANGE UP (ref 3.5–5.3)
POTASSIUM SERPL-SCNC: 3.9 MMOL/L — SIGNIFICANT CHANGE UP (ref 3.5–5.3)
PROT SERPL-MCNC: 6.9 GM/DL — SIGNIFICANT CHANGE UP (ref 6–8.3)
PROT SERPL-MCNC: 7.1 GM/DL — SIGNIFICANT CHANGE UP (ref 6–8.3)
RBC # BLD: 3.08 M/UL — LOW (ref 3.8–5.2)
RBC # FLD: 24.1 % — HIGH (ref 10.3–14.5)
SODIUM SERPL-SCNC: 144 MMOL/L — SIGNIFICANT CHANGE UP (ref 135–145)
SODIUM SERPL-SCNC: 144 MMOL/L — SIGNIFICANT CHANGE UP (ref 135–145)
WBC # BLD: 7.8 K/UL — SIGNIFICANT CHANGE UP (ref 3.8–10.5)
WBC # FLD AUTO: 7.8 K/UL — SIGNIFICANT CHANGE UP (ref 3.8–10.5)

## 2017-05-10 RX ORDER — MULTIVIT WITH MIN/MFOLATE/K2 340-15/3 G
296 POWDER (GRAM) ORAL ONCE
Qty: 0 | Refills: 0 | Status: COMPLETED | OUTPATIENT
Start: 2017-05-10 | End: 2017-05-10

## 2017-05-10 RX ADMIN — OXYCODONE HYDROCHLORIDE 10 MILLIGRAM(S): 5 TABLET ORAL at 13:08

## 2017-05-10 RX ADMIN — PREGABALIN 500 MICROGRAM(S): 225 CAPSULE ORAL at 11:59

## 2017-05-10 RX ADMIN — OXYCODONE HYDROCHLORIDE 10 MILLIGRAM(S): 5 TABLET ORAL at 00:07

## 2017-05-10 RX ADMIN — Medication 110 MILLIGRAM(S): at 11:59

## 2017-05-10 RX ADMIN — OXYCODONE HYDROCHLORIDE 10 MILLIGRAM(S): 5 TABLET ORAL at 07:00

## 2017-05-10 RX ADMIN — SODIUM CHLORIDE 100 MILLILITER(S): 9 INJECTION, SOLUTION INTRAVENOUS at 09:42

## 2017-05-10 RX ADMIN — OXYCODONE HYDROCHLORIDE 10 MILLIGRAM(S): 5 TABLET ORAL at 06:28

## 2017-05-10 RX ADMIN — Medication 1 APPLICATION(S): at 06:34

## 2017-05-10 RX ADMIN — OXYCODONE HYDROCHLORIDE 10 MILLIGRAM(S): 5 TABLET ORAL at 21:27

## 2017-05-10 RX ADMIN — Medication 1 MILLIGRAM(S): at 11:59

## 2017-05-10 RX ADMIN — Medication 20 MILLIGRAM(S): at 11:59

## 2017-05-10 RX ADMIN — Medication 1 APPLICATION(S): at 17:20

## 2017-05-10 RX ADMIN — OXYCODONE HYDROCHLORIDE 10 MILLIGRAM(S): 5 TABLET ORAL at 22:15

## 2017-05-10 RX ADMIN — Medication 296 MILLILITER(S): at 20:40

## 2017-05-10 RX ADMIN — Medication 1 TABLET(S): at 11:59

## 2017-05-10 RX ADMIN — CEFTRIAXONE 100 GRAM(S): 500 INJECTION, POWDER, FOR SOLUTION INTRAMUSCULAR; INTRAVENOUS at 16:34

## 2017-05-10 NOTE — PROGRESS NOTE ADULT - SUBJECTIVE AND OBJECTIVE BOX
Patient is a 51y old  Female who presents with a chief complaint of Weakness, jaundice (06 May 2017 15:13)      Subective:  Still with stool from vagina    PAST MEDICAL & SURGICAL HISTORY:  Hormone replacement therapy  Constipation  Thyroid cyst  Chronic UTI  Obesity  Rectovaginal fistula  Pudendal neuralgia  Pelvic floor dysfunction  Incontinence of feces, unspecified fecal incontinence type  Hepatic adenoma: History of. Surgically removed 2006  Gall bladder stones: History of. Gall bladder removed  Hemorrhoids, unspecified hemorrhoid type  Injury to sacral nerve root, subsequent encounter  Rectocele: History of 2015  Irritable bowel syndrome with both constipation and diarrhea  Acid reflux  Stress incontinence, female  Endometriosis: Hysterectomy  UTI (urinary tract infection): frequent  Depression  Hyperlipidemia  Hypertension  Ahn esophagus  History of rectal surgery: interstimulator for rectal incontinence  later removed 2016  S/P colonoscopy: multiple  S/P foot surgery, left  S/P LASIK surgery of both eyes: 2000  Aplasia ossea microplastica  Traumatic rupture of plantar fascia of left foot, initial encounter: surgery  Tendon rupture, post-op: repaired right elbow 2011  S/P appendectomy: 1979  S/P tonsillectomy: 1979  Hepatic adenoma: resected 2006  S/P Nissen fundoplication (with gastrostomy tube placement): for barrets esophagus 2006  S/P breast augmentation: reduction 2003  Stress incontinence, female: right side of mesh &quot;cut&quot; adjusted  S/P hysterectomy with oophorectomy: with mesh for stress incontinence  S/P exploratory laparotomy: cholecystectomy, ROZINA, endometriosis      MEDICATIONS  (STANDING):  oxyCODONE IR 10milliGRAM(s) Oral three times a day  FLUoxetine 20milliGRAM(s) Oral daily  multivitamin 1Tablet(s) Oral daily  cefTRIAXone   IVPB 1Gram(s) IV Intermittent every 24 hours  dextrose 5% + sodium chloride 0.45%. 1000milliLiter(s) IV Continuous <Continuous>  hydrocortisone 2.5% Rectal Cream 1Application(s) Rectal two times a day  folic acid 1milliGRAM(s) Oral daily  cyanocobalamin 500MICROGram(s) Oral daily  sodium ferric gluconate complex IVPB 125milliGRAM(s) IV Intermittent daily  magnesium citrate Solution 296milliLiter(s) Oral once    MEDICATIONS  (PRN):  diazepam    Tablet 5milliGRAM(s) Oral four times a day PRN anxiety  ondansetron Injectable 4milliGRAM(s) IV Push every 6 hours PRN Nausea  zaleplon 5milliGRAM(s) Oral at bedtime PRN Insomnia  morphine  - Injectable 2milliGRAM(s) IV Push every 3 hours PRN Moderate Pain (4 - 6)      REVIEW OF SYSTEMS:    RESPIRATORY: No shortness of breath  CARDIOVASCULAR: No chest pain  All other review of systems is negative unless indicated above.    Vital Signs Last 24 Hrs  T(C): 36.4, Max: 36.7 (05-10 @ 05:05)  T(F): 97.6, Max: 98 (05-10 @ 05:05)  HR: 73 (67 - 76)  BP: 144/79 (116/42 - 144/79)  BP(mean): --  RR: 17 (17 - 18)  SpO2: 100% (97% - 100%)    PHYSICAL EXAM:    Constitutional: NAD, well-developed  Respiratory: CTAB  Cardiovascular: S1 and S2, RRR  Gastrointestinal: BS+, soft, NT/ND  Extremities: No peripheral edema  Psychiatric: Normal mood, normal affect    LABS:                        10.1   7.8   )-----------( 239      ( 10 May 2017 09:23 )             31.0     05-10    144  |  109<H>  |  7   ----------------------------<  130<H>  3.6   |  26  |  0.85    Ca    8.6      10 May 2017 09:23    TPro  6.9  /  Alb  3.6  /  TBili  1.1  /  DBili  x   /  AST  28  /  ALT  35  /  AlkPhos  96  05-10      LIVER FUNCTIONS - ( 10 May 2017 09:23 )  Alb: 3.6 g/dL / Pro: 6.9 gm/dL / ALK PHOS: 96 U/L / ALT: 35 U/L / AST: 28 U/L / GGT: x

## 2017-05-10 NOTE — PROGRESS NOTE ADULT - SUBJECTIVE AND OBJECTIVE BOX
clinically doing better; mild increase in energy  Urine clearing, no longer dark  Hemoglobin increased 10.1/hematocrit 31  Total bilirubin continues to decrease now normal 1.1    Impression :hemolytic anemia Rosio direct and indirect negative  Probably related to medication, anesthesia, or an occult infectious process  Without any specific therapeutic intervention she appears to be improving.    Plan:/Recheck  LFTs/LDH CBC in a.m.   sigmoidoscopy in a.m. to look for a source of possible rectovaginal fistula.  continue nutritional supports with folic acid/ iron.  above reviewed with patient and family.

## 2017-05-10 NOTE — PROGRESS NOTE ADULT - SUBJECTIVE AND OBJECTIVE BOX
Patient is a 51y old  Female who presents with a chief complaint of Weakness, jaundice (06 May 2017 15:13)    HPI:  Patient is 52yo female with PMHx of Barretts esophagus, Endometriosis, HLD, HTN. Rectovaginal fistula, s/p recent laparoscopic examination of the abd which demonstrated endometriosis on 4/24 presents with weakness and jaundice. Pt reports on Wed, she started feeling weak, fatigued, decreased energy and noted yellowing of her skin and eyes. Pt denies fever chills, cough, chest pain, melena, dark stools. Endorses new onset SOB, without CP/palpitations. No other constitutional symptoms. Upon arrival to the ER HH noted to be 8, baseline 13, 2 weeks ago. (06 May 2017 15:13)    5/7: Pt seen and examined. States she feels a generalized weakness and feels the same as yesterday. States her jaundice has gotten better since admission.  5/8: Pt seen and examined. States she still feels a generalized weakness and feels dizzy when she stands. She passed a large, soft orange stool last night which helped relieve some of her abdominal pain.  5/9: States she feels less weak than yesterday. She is still having feces in her vaginal discharge.  5/10: States she feels much better. She is not weak and is moving around. Still has feces from her vagina and some abd/ groin pain.    ROS:   All 10 systems reviewed and found to be negative with the exception of what has been described above.    MEDICATIONS  (STANDING):  oxyCODONE IR 10milliGRAM(s) Oral three times a day  FLUoxetine 20milliGRAM(s) Oral daily  multivitamin 1Tablet(s) Oral daily  cefTRIAXone   IVPB 1Gram(s) IV Intermittent every 24 hours  dextrose 5% + sodium chloride 0.45%. 1000milliLiter(s) IV Continuous <Continuous>  hydrocortisone 2.5% Rectal Cream 1Application(s) Rectal two times a day  folic acid 1milliGRAM(s) Oral daily  cyanocobalamin 500MICROGram(s) Oral daily  sodium ferric gluconate complex IVPB 125milliGRAM(s) IV Intermittent daily  magnesium citrate Solution 296milliLiter(s) Oral once    MEDICATIONS  (PRN):  diazepam    Tablet 5milliGRAM(s) Oral four times a day PRN anxiety  ondansetron Injectable 4milliGRAM(s) IV Push every 6 hours PRN Nausea  zaleplon 5milliGRAM(s) Oral at bedtime PRN Insomnia  morphine  - Injectable 2milliGRAM(s) IV Push every 3 hours PRN Moderate Pain (4 - 6)    Vital Signs Last 24 Hrs  T(C): 36.4, Max: 36.7 (05-10 @ 05:05)  T(F): 97.6, Max: 98 (05-10 @ 05:05)  HR: 73 (67 - 73)  BP: 144/79 (116/42 - 144/79)  BP(mean): --  RR: 17 (17 - 18)  SpO2: 100% (97% - 100%)    Physical Exam  Constitutional:  Lying in bed, NAD  HEENT: EOMI, PERRL  Neck: supple   Lungs: Clear to auscultation b/l. No rales, rhonchi or wheezing.  CV: s1s2 normal. No murmurs, gallops or rubs  Gastrointestinal:  Distended, BS normal, soft and mildly tender to palpation in all 4 quadrants. No rebound or guarding.  MS: full ROM in all extremities  Ext: Non edematous   Psychiatric:  Mood and affect are normal    Labs                          10.1   7.8   )-----------( 239      ( 10 May 2017 09:23 )             31.0                         9.6    9.4   )-----------( 247      ( 09 May 2017 05:36 )             27.5                         7.9    11.7  )-----------( 248      ( 08 May 2017 05:59 )             23.3                       8.3    13.1  )-----------( 259      ( 07 May 2017 05:22 )             24.0     05-10    144  |  109<H>  |  7   ----------------------------<  130<H>  3.6   |  26  |  0.85    Ca    8.6      10 May 2017 09:23    TPro  6.9  /  Alb  3.6  /  TBili  1.1  /  DBili  x   /  AST  28  /  ALT  35  /  AlkPhos  96  05-10    05-09    143  |  106  |  8   ----------------------------<  103<H>  3.7   |  27  |  0.83    Ca    8.6      09 May 2017 05:36    TPro  6.4  /  Alb  3.4  /  TBili  1.4<H>  /  DBili  0.2  /  AST  32  /  ALT  28  /  AlkPhos  89  05-09 05-08    145  |  110<H>  |  9   ----------------------------<  105<H>  3.7   |  27  |  0.80    Ca    8.4<L>      08 May 2017 05:59    TPro  6.1  /  Alb  3.2<L>  /  TBili  1.6<H>  /  DBili  x   /  AST  30  /  ALT  30  /  AlkPhos  87  05-08 05-07    142  |  108  |  10  ----------------------------<  115<H>  3.9   |  28  |  0.89    Ca    8.4<L>      07 May 2017 05:22  Mg     2.3     05-06    TPro  6.3  /  Alb  3.3  /  TBili  1.9<H>  /  DBili  0.3<H>  /  AST  43<H>  /  ALT  32  /  AlkPhos  84  05-07    C-Reactive Protein, Serum (05.09.17 @ 05:36)    C-Reactive Protein, Serum: 0.60 mg/dL    Sedimentation Rate, Erythrocyte (05.09.17 @ 05:36)    Sedimentation Rate, Erythrocyte: 25 mm/hr    Direct Rosio Profile (05.06.17 @ 10:30)    Dir Antiglob Polyspecific Interpretation: NEG    Haptoglobin, Serum (05.06.17 @ 10:30)    Haptoglobin, Serum: <20: test repeated mg/dL    Culture - Blood (05.06.17 @ 10:37)    Specimen Source: .Blood Blood-Peripheral - only aero botlle recv'd    Culture Results:   No growth to date.    Lactate Dehydrogenase, Serum (05.06.17 @ 13:13)    Lactate Dehydrogenase, Serum: 769 U/L    Reticulocyte Count (05.06.17 @ 10:30)    RBC Count: 2.35 M/uL    Reticulocyte Percent: 12.6 %    Absolute Reticulocytes: 296.6 K/uL    Urinalysis (05.06.17 @ 12:15)    Blood, Urine: Trace    Glucose Qualitative, Urine: Negative mg/dL    pH Urine: 6.0: Please Note: New Reference Range as of 4/18/17    Color: Yellow    Urine Appearance: Slightly Turbid    Bilirubin: Negative    Ketone - Urine: Negative    Specific Gravity: 1.005    Protein, Urine: Negative mg/dL    Urobilinogen: Negative mg/dL    Nitrite: Negative    Leukocyte Esterase Concentration: Moderate    Culture - Urine (05.06.17 @ 12:15)    Specimen Source: .Urine Clean Catch (Midstream)    Culture Results:   Culture grew 3 or more types of organisms which indicate  collection contamination; consider recollection only if clinically  indicated.      EXAM:  CHEST SINGLE VIEW FRONTAL                        PROCEDURE DATE:  05/07/2017    INTERPRETATION:  CHEST SINGLE VIEW FRONTAL  Single AP view  HISTORY:  weakness  Comparison:  none.  The cardiac silhouette is within normal limits. The lungs are clear. No   pleural abnormality.  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  - s/p 1 unit PRBC transfusion on admission and 1 unit PRBC  5/8  - H/H increasing appropriately: 7.9 --> 9.6 --> 10.1  - Monitor CBC, transfuse as needed  - guiac neg, denies melena  - currently afebrile, comfortable on room air  - labs demonstrate an elevated TBilli, with DB 0.5, likely signifying a hemolytic process  - Tbili and LDH trending down  - hematology consult appreciated-  Rosio negative and haptoglobin<20. Check for G6PD, check urine for hemosiderine, laboratory to do a "supersensitive" Rosio to rule out occult autoimmune process, flow to rule out mesenteric disorder  - continue folic acid and B12  - GI consult - requests dr navarro- hepatitis serologies negative, f/u hemochromatosis gene   - continue Analpram for hemorrhoids  - colorectal consult appreciated-  plan for flex sigmoidoscopy to localize fistula tomorrow  - ID consult appreciated- CMV neg, EBV neg acute. f/u parvovirus B19, and HIV testing as these are known to contribute to anemia  - hold BP meds  - hold heparin    # HTN  - hold BP meds for now    # HLD  - patient not on any statins    # leukocytosis  - afebrile, HD stable, has +UA  - treat UTI with ceftriaxone  - f/u blood cultures- no growth to date  - f/u urine culture- contamination     # UTI  - Rocephin Patient is a 51y old  Female who presents with a chief complaint of Weakness, jaundice (06 May 2017 15:13)    HPI:  Patient is 50yo female with PMHx of Barretts esophagus, Endometriosis, HLD, HTN. Rectovaginal fistula, s/p recent laparoscopic examination of the abd which demonstrated endometriosis on 4/24 presents with weakness and jaundice. Pt reports on Wed, she started feeling weak, fatigued, decreased energy and noted yellowing of her skin and eyes. Pt denies fever chills, cough, chest pain, melena, dark stools. Endorses new onset SOB, without CP/palpitations. No other constitutional symptoms. Upon arrival to the ER HH noted to be 8, baseline 13, 2 weeks ago. (06 May 2017 15:13)    5/7: Pt seen and examined. States she feels a generalized weakness and feels the same as yesterday. States her jaundice has gotten better since admission.  5/8: Pt seen and examined. States she still feels a generalized weakness and feels dizzy when she stands. She passed a large, soft orange stool last night which helped relieve some of her abdominal pain.  5/9: States she feels less weak than yesterday. She is still having feces in her vaginal discharge.  5/10: States she feels much better. She is not weak and is moving around. Still has feces from her vagina and some abd/ groin pain.    ROS:   All 10 systems reviewed and found to be negative with the exception of what has been described above.    MEDICATIONS  (STANDING):  oxyCODONE IR 10milliGRAM(s) Oral three times a day  FLUoxetine 20milliGRAM(s) Oral daily  multivitamin 1Tablet(s) Oral daily  cefTRIAXone   IVPB 1Gram(s) IV Intermittent every 24 hours  dextrose 5% + sodium chloride 0.45%. 1000milliLiter(s) IV Continuous <Continuous>  hydrocortisone 2.5% Rectal Cream 1Application(s) Rectal two times a day  folic acid 1milliGRAM(s) Oral daily  cyanocobalamin 500MICROGram(s) Oral daily  sodium ferric gluconate complex IVPB 125milliGRAM(s) IV Intermittent daily  magnesium citrate Solution 296milliLiter(s) Oral once    MEDICATIONS  (PRN):  diazepam    Tablet 5milliGRAM(s) Oral four times a day PRN anxiety  ondansetron Injectable 4milliGRAM(s) IV Push every 6 hours PRN Nausea  zaleplon 5milliGRAM(s) Oral at bedtime PRN Insomnia  morphine  - Injectable 2milliGRAM(s) IV Push every 3 hours PRN Moderate Pain (4 - 6)    Vital Signs Last 24 Hrs  T(C): 36.4, Max: 36.7 (05-10 @ 05:05)  T(F): 97.6, Max: 98 (05-10 @ 05:05)  HR: 73 (67 - 73)  BP: 144/79 (116/42 - 144/79)  BP(mean): --  RR: 17 (17 - 18)  SpO2: 100% (97% - 100%)    Physical Exam  Constitutional:  Lying in bed, NAD  HEENT: EOMI, PERRL  Neck: supple   Lungs: Clear to auscultation b/l. No rales, rhonchi or wheezing.  CV: s1s2 normal. No murmurs, gallops or rubs  Gastrointestinal:  Distended, BS normal, soft and mildly tender to palpation in all 4 quadrants. No rebound or guarding.  MS: full ROM in all extremities  Ext: Non edematous   Psychiatric:  Mood and affect are normal    Labs                          10.1   7.8   )-----------( 239      ( 10 May 2017 09:23 )             31.0                         9.6    9.4   )-----------( 247      ( 09 May 2017 05:36 )             27.5                         7.9    11.7  )-----------( 248      ( 08 May 2017 05:59 )             23.3                       8.3    13.1  )-----------( 259      ( 07 May 2017 05:22 )             24.0     05-10    144  |  109<H>  |  7   ----------------------------<  130<H>  3.6   |  26  |  0.85    Ca    8.6      10 May 2017 09:23    TPro  6.9  /  Alb  3.6  /  TBili  1.1  /  DBili  x   /  AST  28  /  ALT  35  /  AlkPhos  96  05-10    05-09    143  |  106  |  8   ----------------------------<  103<H>  3.7   |  27  |  0.83    Ca    8.6      09 May 2017 05:36    TPro  6.4  /  Alb  3.4  /  TBili  1.4<H>  /  DBili  0.2  /  AST  32  /  ALT  28  /  AlkPhos  89  05-09 05-08    145  |  110<H>  |  9   ----------------------------<  105<H>  3.7   |  27  |  0.80    Ca    8.4<L>      08 May 2017 05:59    TPro  6.1  /  Alb  3.2<L>  /  TBili  1.6<H>  /  DBili  x   /  AST  30  /  ALT  30  /  AlkPhos  87  05-08 05-07    142  |  108  |  10  ----------------------------<  115<H>  3.9   |  28  |  0.89    Ca    8.4<L>      07 May 2017 05:22  Mg     2.3     05-06    TPro  6.3  /  Alb  3.3  /  TBili  1.9<H>  /  DBili  0.3<H>  /  AST  43<H>  /  ALT  32  /  AlkPhos  84  05-07    C-Reactive Protein, Serum (05.09.17 @ 05:36)    C-Reactive Protein, Serum: 0.60 mg/dL    Sedimentation Rate, Erythrocyte (05.09.17 @ 05:36)    Sedimentation Rate, Erythrocyte: 25 mm/hr    Direct Rosio Profile (05.06.17 @ 10:30)    Dir Antiglob Polyspecific Interpretation: NEG    Haptoglobin, Serum (05.06.17 @ 10:30)    Haptoglobin, Serum: <20: test repeated mg/dL    Culture - Blood (05.06.17 @ 10:37)    Specimen Source: .Blood Blood-Peripheral - only aero botlle recv'd    Culture Results:   No growth to date.    Lactate Dehydrogenase, Serum (05.06.17 @ 13:13)    Lactate Dehydrogenase, Serum: 769 U/L    Reticulocyte Count (05.06.17 @ 10:30)    RBC Count: 2.35 M/uL    Reticulocyte Percent: 12.6 %    Absolute Reticulocytes: 296.6 K/uL    Urinalysis (05.06.17 @ 12:15)    Blood, Urine: Trace    Glucose Qualitative, Urine: Negative mg/dL    pH Urine: 6.0: Please Note: New Reference Range as of 4/18/17    Color: Yellow    Urine Appearance: Slightly Turbid    Bilirubin: Negative    Ketone - Urine: Negative    Specific Gravity: 1.005    Protein, Urine: Negative mg/dL    Urobilinogen: Negative mg/dL    Nitrite: Negative    Leukocyte Esterase Concentration: Moderate    Culture - Urine (05.06.17 @ 12:15)    Specimen Source: .Urine Clean Catch (Midstream)    Culture Results:   Culture grew 3 or more types of organisms which indicate  collection contamination; consider recollection only if clinically  indicated.      EXAM:  CHEST SINGLE VIEW FRONTAL                        PROCEDURE DATE:  05/07/2017    INTERPRETATION:  CHEST SINGLE VIEW FRONTAL  Single AP view  HISTORY:  weakness  Comparison:  none.  The cardiac silhouette is within normal limits. The lungs are clear. No   pleural abnormality.  IMPRESSION: Normal AP chest      Assessment: 50yo female with weakness and jaundice, admitted for anemia    # Anemia -nayana drug induced hemolytic anemia secondary to cefotetan - resolving   - HH=13, on 4/24, in ED 8 (received 1 unit PRBC).  - s/p 1 unit PRBC transfusion on admission and 1 unit PRBC  5/8  - H/H increasing appropriately: 7.9 --> 9.6 --> 10.1  - Monitor CBC, transfuse as needed  - guiac neg, denies melena  - currently afebrile, comfortable on room air  - labs demonstrate an elevated TBilli, with DB 0.5, likely signifying a hemolytic process  - Tbili and LDH trending down  - hematology consult appreciated-  Rosio negative and haptoglobin<20. Check for G6PD, check urine for hemosiderine, laboratory to do a "supersensitive" Rosio to rule out occult autoimmune process, flow to rule out mesenteric disorder  - continue folic acid and B12  - GI consult - requests dr navarro- hepatitis serologies negative, f/u hemochromatosis gene   - continue Analpram for hemorrhoids  - colorectal consult appreciated-  plan for flex sigmoidoscopy to localize fistula tomorrow  - ID consult appreciated- CMV neg, EBV neg acute. f/u parvovirus B19, and HIV testing as these are known to contribute to anemia  - hold BP meds  - hold heparin    # HTN  - hold BP meds for now    # HLD  - patient not on any statins    # leukocytosis  - afebrile, HD stable, has +UA  - treat UTI with ceftriaxone  - f/u blood cultures- no growth to date  - f/u urine culture- contamination     # UTI  - Rocephin    attempted to call dtr in law sarkar who is a nurse at [phone] at patients request. left voicemail

## 2017-05-10 NOTE — CONSULT NOTE ADULT - SUBJECTIVE AND OBJECTIVE BOX
32-year-old female whose history dates back to several months ago when she was diagnosed with hyperthyroidism.  Patient states she lost approximately 50+ pounds over the last year.  She saw an endocrinologist to make the diagnosis. She was started on METHINAZOLE.    patient forgot to take her medication and was readmitted with no anorexia abdominal pain nausea and vomiting, hypokalemia and other electrolyte abnormalities.  She was treated with IV fluids, potassium and restarted on methimazole.  Patient noted to have anemia and evidence to comment on this.  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  Tobacco use recently stopped, prior up to one pack a week, alcohol denied drug use denied  Single, 6 children ages 14-3 years of age; healthy  Menses q. month last 2-3 days;; no unusual bleeding or bruising no family history of bleeding or bruising    physical examination:  Well-developed well-nourished 32-year-old female comfortable, in bed  HEENT: unremarkable, neck supple, lungs clear, heart S1-S2, abdomen soft nontender / no masses or organomegaly noted/ extremities no edema/lymph nodes none palpable  WBC 4.7, Hgb 9.3,, HCT 30 platelets 359, total protein 8.8    CT scan abdomen and pelvis reviewed with radiologist Dr. Isaac Stewart. , No evidence of unusual lymphadenopathy or masses, no ovarian cyst seen on CAT scan although previously described of some, no evidence of malignancy below diaphragm.    Anemia probably multifactorial margin related 2 months of thyroid dysfunction, mild iron deficiency caused by menses and recent phlebotomies.  Plan;, recheck iron studies B12 and folate, rule out hemoglobinopathy check globin electrophoresis, check immunofixation and immunoelectrophoresis given the elevated protein  Additional suggestions after the above is complete.
51-year-old female complex medical / surgical history   Rigo fundoplication; 4 severe acid reflux and subsequent Ahn's esophagitis; reflux was so bad it damaged and lead to loss of teeth  2006 underwent resection of 10% of the liver due to a "adenoma which was found during the fundoplication procedure; adenoma attributed to oral contraceptives  2012 :/robotic RUSSEL BSO done for fibroids/endometriosis;  and placement of a mid-urethral sling  2012, significant bleeding episode attribute old to the mesh which involved the urethra; underwent additional urologic procedure to release the right side of the mesh  2012 persistent pain in the groin difficulty raising her leg  2012 exploratory laparotomy, gallbladder removed due to stones, endometriosis found to involve "intestines and bowel"  for the last 5 years patient has had recurrent UTIs; persistent pain related to above  patient has had symptoms suggestive of rectal vaginal fistula with findings of stool coming from the urethra  2015 at interest and pacemaker placed into the spine to help control urination  2015 major reconstruction of the vagina, rectocele repaired, surgical mesh unsuccessful  2017 "pacemaker removed as was no longer working  Several weeks ago underwent exploratory laparotomy; source of rectovaginal fistula not found, endometriosis and adhesions noted  Patient has terrible postoperative pain; most notable in the left upper quadrant; has been taking oxycodone for this  States she has not had any significant bowel movements in the last 2 weeks. There has been a 10 pound weight loss  She developed new fatigue, urine the color of iced tea, foul-smelling ;;and mustard colored stool  came to the emergency room and found to have a drop in hemoglobin from 13 several weeks ago; now approximately 8; with increased bilirubin. An increased LDH.  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 toxic exposures  Tobacco none for 27 years; alcohol on rare social occasions; allergies sulfur drugs me to palpitations  Family history mother had uterine cancer; father  in 56 due to MI; one brother and sister, one son and one daughter; all reportedly healthy  physical examination obese well-developed well-nourished, 51-year-old female in no acute distress  Medications:  oxycodone IR 3 times a day, fluoxetine; MVI;   Neck supple without palpable thyroid lymph nodes;  Lungs:  clear; Breast S/p reduction, well-healed scars, no masses noted   Heart:  S1 S2          Abd: increased girth ,minimal tenderness; no rebound or guarding, Numerous well-healed scars  extremities: no pitting edema; no cyanosis; no clubbing;                                              lymph nodes: none are palpable    WBC 17.9; Hgb 8.; HCT 22; platelets 241; MCV 94 point, RDW 27.5     BUN 14/creatinine 0.92  total bilirubin 3.2; direct bili 0.5; indirect 2.7;  iron elevated to 80, percent saturation 94%, ferritin 1128; reticulocyte 12.6; one metamyelocyte myelocyte and 6 nucleated red blood cells noted  impression: 51-year-old female with long history of recurrent urinary tract infections, at least 10 surgeries for, reconstruction the vagina ; rectovaginal fistula; persistent evidence of feces in urine; persistent abdominal pain, with no significant bowel movement in 10 days,  With leukocytosis, anemia, increased indirect bilirubin,, increased LDH increased reticulocyte,  Laboratory does suggest possible hemolysis however there may be a mechanical process(versus a truly immune mediated one) given the associated right upper quadrant pain, lack of passing stool over a ten-day period, recent surgical intervention leukocytosis.  Will review smear; request Rosio direct and indirect, and haptoglobin from the original type and cross specimen( much of the blood work was drawn after the patient's transfusion began)  await evaluation / discussion with gastroenterology,, urology,and colorectal surgery; consider blood cultures were occult subacute infection.  Above in detail with the patient; all questions answered.
HPI:  Patient is 50yo female with PMHx of Barretts esophagus, Endometriosis, HLD, HTN. Rectovaginal fistula, s/p recent laparoscopic examination of the abd which demonstrated endometriosis on  presents with weakness and jaundice. Pt reports on Wed, she started feeling weak, fatigued, decreased energy and noted yellowing of her skin and eyes. Pt denies fever chills, cough, chest pain, melena, dark stools. Endorses new onset SOB, without CP/palpitations. No other constitutional symptoms. Upon arrival to the ER HH noted to be 8, baseline 13, 2 weeks ago. (06 May 2017 15:13)  Patiient is stauts post explortion for colovaginal fistula.  No fistula was found.  She presents with worsening lower quadrant abdominal pain.  She has been experincing nausea but no vomiting.  She has been having small bm daily.   Additionally she has had two days of worsening weakness and jaudice which is what brought her in.      PAST MEDICAL & SURGICAL HISTORY:  Hormone replacement therapy  Constipation  Thyroid cyst  Chronic UTI  Obesity  Rectovaginal fistula  Pudendal neuralgia  Pelvic floor dysfunction  Incontinence of feces, unspecified fecal incontinence type  Hepatic adenoma: History of. Surgically removed   Gall bladder stones: History of. Gall bladder removed  Hemorrhoids, unspecified hemorrhoid type  Injury to sacral nerve root, subsequent encounter  Rectocele: History of   Irritable bowel syndrome with both constipation and diarrhea  Acid reflux  Stress incontinence, female  Endometriosis: Hysterectomy  UTI (urinary tract infection): frequent  Depression  Hyperlipidemia  Hypertension  Ahn esophagus  History of rectal surgery: interstimulator for rectal incontinence  later removed 2016  S/P colonoscopy: multiple  S/P foot surgery, left  S/P LASIK surgery of both eyes:   Aplasia ossea microplastica  Traumatic rupture of plantar fascia of left foot, initial encounter: surgery  Tendon rupture, post-op: repaired right elbow   S/P appendectomy:   S/P tonsillectomy:   Hepatic adenoma: resected   S/P Nissen fundoplication (with gastrostomy tube placement): for barrets esophagus   S/P breast augmentation: reduction   Stress incontinence, female: right side of mesh &quot;cut&quot; adjusted  S/P hysterectomy with oophorectomy: with mesh for stress incontinence  S/P exploratory laparotomy: cholecystectomy, ROZINA, endometriosis      REVIEW OF SYSTEMS      General:  Patient feels weak and tired	    Gastrointestinal:	  abdominal pain nausea, jaundice	    All other ROS are negative.    MEDICATIONS  (STANDING):  oxyCODONE IR 10milliGRAM(s) Oral three times a day  FLUoxetine 20milliGRAM(s) Oral daily  multivitamin 1Tablet(s) Oral daily  cefTRIAXone   IVPB 1Gram(s) IV Intermittent every 24 hours  dextrose 5% + sodium chloride 0.45%. 1000milliLiter(s) IV Continuous <Continuous>    MEDICATIONS  (PRN):  diazepam    Tablet 5milliGRAM(s) Oral four times a day PRN anxiety  ondansetron Injectable 4milliGRAM(s) IV Push every 6 hours PRN Nausea  zaleplon 5milliGRAM(s) Oral at bedtime PRN Insomnia  morphine  - Injectable 2milliGRAM(s) IV Push every 3 hours PRN Moderate Pain (4 - 6)      Allergies    sulfa drugs (Anaphylaxis)    Intolerances        SOCIAL HISTORY:    FAMILY HISTORY:  Family history of uterine cancer (Mother): mother  Family history of hypertension (Father, Mother): mother and father  Family history of heart attack (Father): Father      Vital Signs Last 24 Hrs  T(C): 37.1, Max: 37.2 (05-06 @ 22:00)  T(F): 98.8, Max: 99 (05-06 @ 22:00)  HR: 80 (80 - 89)  BP: 102/58 (90/53 - 125/60)  BP(mean): --  RR: 17 (13 - 20)  SpO2: 96% (93% - 100%)    PHYSICAL EXAM:      Constitutional:  Well nourished in NAD.  She appears comfortable.    Eyes:  PERRL    ENMT:  NCAT    Neck:  soft FROM    Breasts:  Deffered    Back:  Normal    Respiratory:  CTA good inspitory effort    Cardiovascular:  S1 and S2 regular    Gastrointestinal:  soft minimal lower quadrant tenderness no rebound or guarding    Genitourinary:  deffered    Rectal:  deferred    Extremities:  no edema    Vascular:  normal    Neurological:  AAOX3, motor and sensory intact    Skin:  no rashes or lesions    Musculoskeletal:  FROM X4    Psychiatric:  Mood and affect are normal        LABS:                        8.6    15.9  )-----------( 241      ( 06 May 2017 19:34 )             23.0     05-06    141  |  105  |  14  ----------------------------<  103<H>  3.8   |  26  |  0.92    Ca    9.1      06 May 2017 10:29  Mg     2.3     -    TPro  7.2  /  Alb  3.8  /  TBili  3.2<H>  /  DBili  0.5<H>  /  AST  44<H>  /  ALT  32  /  AlkPhos  99  -    PT/INR - ( 06 May 2017 10:29 )   PT: 12.0 sec;   INR: 1.11 ratio         PTT - ( 06 May 2017 10:29 )  PTT:31.0 sec  Urinalysis Basic - ( 06 May 2017 12:15 )    Color: Yellow / Appearance: Slightly Turbid / S.005 / pH: x  Gluc: x / Ketone: Negative  / Bili: Negative / Urobili: Negative mg/dL   Blood: x / Protein: Negative mg/dL / Nitrite: Negative   Leuk Esterase: Moderate / RBC: 3-5 /HPF / WBC 11-25   Sq Epi: x / Non Sq Epi: Few / Bacteria: Few        RADIOLOGY & ADDITIONAL STUDIES:
HPI:  Patient is 50yo female with PMHx of Endometriosis, HLD, HTN. Rectovaginal fistula, s/p recent laparoscopic examination of the abd which demonstrated endometriosis on 4/24 presents with weakness and jaundice. Pt reports on Wed, she started feeling weak, fatigued, decreased energy and noted yellowing of her skin and eyes. Pt denies fever chills, cough, chest pain, melena, dark stools. Endorses new onset SOB, without CP/palpitations. No other constitutional symptoms. Upon arrival to the ER HH noted to be 8, baseline 13, 2 weeks ago. (06 May 2017 15:13)    Has some BRBPR and pruritus ani, no melena    PAST MEDICAL & SURGICAL HISTORY:  Hormone replacement therapy  Constipation  Thyroid cyst  Chronic UTI  Obesity  Rectovaginal fistula  Pudendal neuralgia  Pelvic floor dysfunction  Incontinence of feces, unspecified fecal incontinence type  Hepatic adenoma: History of. Surgically removed 2006  Gall bladder stones: History of. Gall bladder removed  Hemorrhoids, unspecified hemorrhoid type  Injury to sacral nerve root, subsequent encounter  Rectocele: History of 2015  Irritable bowel syndrome with both constipation and diarrhea  Acid reflux  Stress incontinence, female  Endometriosis: Hysterectomy  UTI (urinary tract infection): frequent  Depression  Hyperlipidemia  Hypertension  Ahn esophagus  History of rectal surgery: interstimulator for rectal incontinence  later removed 2016  S/P colonoscopy: multiple  S/P foot surgery, left  S/P LASIK surgery of both eyes: 2000  Aplasia ossea microplastica  Traumatic rupture of plantar fascia of left foot, initial encounter: surgery  Tendon rupture, post-op: repaired right elbow 2011  S/P appendectomy: 1979  S/P tonsillectomy: 1979  Hepatic adenoma: resected 2006  S/P Nissen fundoplication (with gastrostomy tube placement): for barrets esophagus 2006  S/P breast augmentation: reduction 2003  Stress incontinence, female: right side of mesh &quot;cut&quot; adjusted  S/P hysterectomy with oophorectomy: with mesh for stress incontinence  S/P exploratory laparotomy: cholecystectomy, ROZINA, endometriosis      MEDICATIONS  (STANDING):  oxyCODONE IR 10milliGRAM(s) Oral three times a day  FLUoxetine 20milliGRAM(s) Oral daily  multivitamin 1Tablet(s) Oral daily  cefTRIAXone   IVPB 1Gram(s) IV Intermittent every 24 hours  dextrose 5% + sodium chloride 0.45%. 1000milliLiter(s) IV Continuous <Continuous>  hydrocortisone 2.5% Rectal Cream 1Application(s) Rectal two times a day    MEDICATIONS  (PRN):  diazepam    Tablet 5milliGRAM(s) Oral four times a day PRN anxiety  ondansetron Injectable 4milliGRAM(s) IV Push every 6 hours PRN Nausea  zaleplon 5milliGRAM(s) Oral at bedtime PRN Insomnia  morphine  - Injectable 2milliGRAM(s) IV Push every 3 hours PRN Moderate Pain (4 - 6)      Allergies    sulfa drugs (Anaphylaxis)    Intolerances        FAMILY HISTORY:  Family history of uterine cancer (Mother): mother  Family history of hypertension (Father, Mother): mother and father  Family history of heart attack (Father): Father      As above  Otherwise unremarkable    Vital Signs Last 24 Hrs  T(C): 36.7, Max: 36.8 (05-07 @ 21:00)  T(F): 98, Max: 98.2 (05-07 @ 21:00)  HR: 83 (83 - 85)  BP: 111/48 (104/55 - 111/48)  BP(mean): --  RR: 15 (15 - 16)  SpO2: 93% (93% - 98%)    Constitutional: NAD, well-developed  Respiratory: CTAB  Cardiovascular: S1 and S2, RRR  Gastrointestinal: BS+, soft, NT/ND  Extremities: No peripheral edema  Psychiatric: Normal mood, normal affect  Skin: No rashes  Rectal: Yellow-brown stool, guaaic negative    LABS:                        8.3    13.1  )-----------( 259      ( 07 May 2017 05:22 )             24.0     05-08    145  |  110<H>  |  9   ----------------------------<  105<H>  3.7   |  27  |  0.80    Ca    8.4<L>      08 May 2017 05:59  Mg     2.3     05-06    TPro  6.1  /  Alb  3.2<L>  /  TBili  1.6<H>  /  DBili  x   /  AST  30  /  ALT  30  /  AlkPhos  87  05-08    PT/INR - ( 06 May 2017 10:29 )   PT: 12.0 sec;   INR: 1.11 ratio         PTT - ( 06 May 2017 10:29 )  PTT:31.0 sec  LIVER FUNCTIONS - ( 08 May 2017 05:59 )  Alb: 3.2 g/dL / Pro: 6.1 gm/dL / ALK PHOS: 87 U/L / ALT: 30 U/L / AST: 30 U/L / GGT: x
Patient is a 51y old  Female who presents with a chief complaint of Weakness, jaundice (06 May 2017 15:13)      HPI:  Patient is 50yo female with PMHx of Barretts esophagus, Endometriosis, HLD, HTN. Rectovaginal fistula, s/p recent laparoscopic examination of the abd which demonstrated endometriosis on 4/24 admitted 5/6  with weakness and jaundice. Pt reports on Wed prior to admission- she started feeling weak, fatigued, decreased energy and noted yellowing of her skin and eyes. Upon arrival to the ER HH noted to be 8, baseline 13, 2 weeks ago with elevated bili to 3.2, indirect 1.6, occasionally has had some brbpr d/t hemorrhoids but no active bleeding, she reports having feces output from her vagina for quite some time now after all her previous surgeries and 2 weeks ago surgery did not reveal any notable fistula, adhesions and changes c/w endometriosis were seen. She is being eval by GI and colo-rectal surgery with plans for flex/sigmoidoscopy to eval for fistula. 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 She denies any cough, no diarrhea, no abd pain, no nasal congestion or URI symptoms. No rashes, no recent travel. No sick contacts. Pt only sexually active with . Of note, her wbc ct was elevated to 17.2 on admit, UA was positive, cx contaminated and she has been on IV rocephin since 5/6.  I.D was consulted to r/o infectious etiologies for anemia.      PMH: as above    PSH: as above    Meds: per reconciliation sheet, noted below    MEDICATIONS  (STANDING):  oxyCODONE IR 10milliGRAM(s) Oral three times a day  FLUoxetine 20milliGRAM(s) Oral daily  multivitamin 1Tablet(s) Oral daily  cefTRIAXone   IVPB 1Gram(s) IV Intermittent every 24 hours  dextrose 5% + sodium chloride 0.45%. 1000milliLiter(s) IV Continuous <Continuous>  hydrocortisone 2.5% Rectal Cream 1Application(s) Rectal two times a day  folic acid 1milliGRAM(s) Oral daily  cyanocobalamin 500MICROGram(s) Oral daily  sodium ferric gluconate complex IVPB 125milliGRAM(s) IV Intermittent daily      Allergies    sulfa drugs (Anaphylaxis)    Intolerances        Social: no smoking, no alcohol, no illegal drugs; no recent travel, no exposure to TB    Family history:  Family history of uterine cancer (Mother): mother  Family history of hypertension (Father, Mother): mother and father  Family history of heart attack (Father): Father      ROS: the patient denies fever, no chills, no HA, no dizziness, no sore throat, no blurry vision, no CP, no palpitations, no SOB, no cough, no abdominal pain, no diarrhea, no N/V,  no leg pain, no claudication, no rash, no joint aches, no rectal pain or bleeding, no night sweats    Vital Signs Last 24 Hrs  T(C): 36.7, Max: 36.9 (05-08 @ 16:06)  T(F): 98, Max: 98.5 (05-08 @ 20:12)  HR: 75 (75 - 83)  BP: 109/58 (107/62 - 137/48)  BP(mean): --  RR: 18 (16 - 19)  SpO2: 98% (97% - 100%)      PE:  Constitutional: NAD  HEENT: NC/AT, EOMI, PERRLA  Neck: supple  Back: no tenderness  Respiratory: clear  Cardiovascular: S1S2 regular, no murmurs  Abdomen: soft, not tender, not distended, positive BS  Genitourinary: deferred  Rectal: deferred  Musculoskeletal: no muscle tenderness, no joint swelling or tenderness  Extremities: no pedal edema  Neurological: AxOx3, moving all extremities, no focal deficits  Skin: no rashes    Labs:                        9.6    9.4   )-----------( 247      ( 09 May 2017 05:36 )             27.5     05-09    143  |  106  |  8   ----------------------------<  103<H>  3.7   |  27  |  0.83    Ca    8.6      09 May 2017 05:36    TPro  6.4  /  Alb  3.4  /  TBili  1.4<H>  /  DBili  0.2  /  AST  32  /  ALT  28  /  AlkPhos  89  05-09     LIVER FUNCTIONS - ( 09 May 2017 05:36 )  Alb: 3.4 g/dL / Pro: 6.4 gm/dL / ALK PHOS: 89 U/L / ALT: 28 U/L / AST: 32 U/L / GGT: x           Culture - Urine (05.06.17 @ 12:15)    Specimen Source: .Urine Clean Catch (Midstream)    Culture Results:   Culture grew 3 or more types of organisms which indicate  collection contamination; consider recollection only if clinically  indicated.        Culture - Urine (05.06.17 @ 12:15)    Specimen Source: .Urine Clean Catch (Midstream)    Culture Results:   Culture grew 3 or more types of organisms which indicate  collection contamination; consider recollection only if clinically  indicated.    Culture - Blood (05.06.17 @ 10:29)    Specimen Source: .Blood None    Culture Results:   No growth to date.            Radiology:   EXAM:  CT ABDOMEN AND PELVIS OC IC                            PROCEDURE DATE:  05/06/2017        INTERPRETATION:  CT Abdomen  and Pelvis with IV contrast         CLINICAL INFORMATION:  Abdominal pain and jaundice  , history of   colovaginal fistula    TECHNIQUE: Contiguous axial 5 mm images were obtained from a single   helical acquisition through the abdomen and pelvis during the intravenous   administration of 90 cc of Omnipaque 350. 10 cc were discarded .   Oral   contrast was also administered.         FINDINGS:   Prior CT of the abdomen pelvis dated 11/18/2016 is available   for review.    The lung bases are clear.  There is a small hiatal hernia.    The liver demonstrates that the patient is status post left hepatectomy.   No focal lesions are seen in the liver. No intrahepatic biliary ductal   dilatation is seen. Common bile duct measures 7 mm. This is within normal   limits in this patient who is status post cholecystectomy.    The   gallbladder surgically absent. The pancreas is intact without ductal   dilatation or focal lesion.   The spleen is normal in size without focal   lesions.    The adrenal glands are intact.   The kidneys demonstrate a normal   enhancement pattern.    No renal mass is found.  No renal calculus,   hydronephrosis or perinephric infiltration is found.  The ureters are not   dilated.    The bladder appears unremarkable.    There is no evidence of pelvic mass or free pelvic fluid.    There is no evidence of lymphadenopathy or ascites.  Patient is status post hysterectomy. Trace air is seen in the vagina. No   contrast is seen in the vagina.  The bowel appears unremarkable without evidence of bowel obstruction,   perforation or abscess.   There is a ventral hernia in the upper anterior abdominalwall in the   midline. Fat protrudes through the hernia defect. There is scarring in   the subcutaneous compartment of the anterior abdominal wall.  Osseous structures are intact.    IMPRESSION: Postoperative changes in the liver. Common bile duct within   normal limits for patient to is status post cholecystectomy.   Small   amount of air in the vagina which may be secondary to colorectal vaginal   fistula. No contrast seen in the vagina.      Advanced directives addressed: full resuscitation

## 2017-05-11 LAB
ALBUMIN SERPL ELPH-MCNC: 3.5 G/DL — SIGNIFICANT CHANGE UP (ref 3.3–5)
ALP SERPL-CCNC: 91 U/L — SIGNIFICANT CHANGE UP (ref 40–120)
ALT FLD-CCNC: 33 U/L — SIGNIFICANT CHANGE UP (ref 12–78)
ANION GAP SERPL CALC-SCNC: 8 MMOL/L — SIGNIFICANT CHANGE UP (ref 5–17)
AST SERPL-CCNC: 36 U/L — SIGNIFICANT CHANGE UP (ref 15–37)
B19V IGG SER-ACNC: 4.1 INDEX — HIGH (ref 0–0.8)
B19V IGG+IGM SER-IMP: POSITIVE
B19V IGG+IGM SER-IMP: SIGNIFICANT CHANGE UP
B19V IGM FLD-ACNC: 0.1 INDEX — SIGNIFICANT CHANGE UP (ref 0–0.8)
B19V IGM SER-ACNC: NEGATIVE — SIGNIFICANT CHANGE UP
BILIRUB SERPL-MCNC: 0.9 MG/DL — SIGNIFICANT CHANGE UP (ref 0.2–1.2)
BUN SERPL-MCNC: 8 MG/DL — SIGNIFICANT CHANGE UP (ref 7–23)
CALCIUM SERPL-MCNC: 8.7 MG/DL — SIGNIFICANT CHANGE UP (ref 8.5–10.1)
CHLORIDE SERPL-SCNC: 108 MMOL/L — SIGNIFICANT CHANGE UP (ref 96–108)
CO2 SERPL-SCNC: 29 MMOL/L — SIGNIFICANT CHANGE UP (ref 22–31)
CREAT SERPL-MCNC: 0.8 MG/DL — SIGNIFICANT CHANGE UP (ref 0.5–1.3)
CULTURE RESULTS: SIGNIFICANT CHANGE UP
CULTURE RESULTS: SIGNIFICANT CHANGE UP
D DIMER BLD IA.RAPID-MCNC: 603 NG/ML DDU — HIGH
G6PD RBC-CCNC: 14.6 U/G HB — HIGH (ref 4.6–13.5)
G6PD RBC-CCNC: 14.8 U/G HB — HIGH (ref 4.6–13.5)
GLUCOSE SERPL-MCNC: 100 MG/DL — HIGH (ref 70–99)
HCT VFR BLD CALC: 30.4 % — LOW (ref 34.5–45)
HEMOSIDERIN UR-MCNC: SIGNIFICANT CHANGE UP
HGB BLD-MCNC: 9.7 G/DL — LOW (ref 11.5–15.5)
LDH SERPL L TO P-CCNC: 671 U/L — HIGH (ref 50–242)
MCHC RBC-ENTMCNC: 32 GM/DL — SIGNIFICANT CHANGE UP (ref 32–36)
MCHC RBC-ENTMCNC: 33.2 PG — SIGNIFICANT CHANGE UP (ref 27–34)
MCV RBC AUTO: 103.6 FL — HIGH (ref 80–100)
PLATELET # BLD AUTO: 226 K/UL — SIGNIFICANT CHANGE UP (ref 150–400)
POTASSIUM SERPL-MCNC: 3.8 MMOL/L — SIGNIFICANT CHANGE UP (ref 3.5–5.3)
POTASSIUM SERPL-SCNC: 3.8 MMOL/L — SIGNIFICANT CHANGE UP (ref 3.5–5.3)
PROT SERPL-MCNC: 6.7 GM/DL — SIGNIFICANT CHANGE UP (ref 6–8.3)
RBC # BLD: 2.93 M/UL — LOW (ref 3.8–5.2)
RBC # FLD: 23 % — HIGH (ref 10.3–14.5)
SODIUM SERPL-SCNC: 145 MMOL/L — SIGNIFICANT CHANGE UP (ref 135–145)
SPECIMEN SOURCE: SIGNIFICANT CHANGE UP
SPECIMEN SOURCE: SIGNIFICANT CHANGE UP
WBC # BLD: 5.3 K/UL — SIGNIFICANT CHANGE UP (ref 3.8–10.5)
WBC # FLD AUTO: 5.3 K/UL — SIGNIFICANT CHANGE UP (ref 3.8–10.5)

## 2017-05-11 RX ORDER — ONDANSETRON 8 MG/1
4 TABLET, FILM COATED ORAL ONCE
Qty: 0 | Refills: 0 | Status: DISCONTINUED | OUTPATIENT
Start: 2017-05-11 | End: 2017-05-11

## 2017-05-11 RX ORDER — SODIUM CHLORIDE 9 MG/ML
1000 INJECTION, SOLUTION INTRAVENOUS
Qty: 0 | Refills: 0 | Status: DISCONTINUED | OUTPATIENT
Start: 2017-05-11 | End: 2017-05-11

## 2017-05-11 RX ORDER — HYDROMORPHONE HYDROCHLORIDE 2 MG/ML
0.5 INJECTION INTRAMUSCULAR; INTRAVENOUS; SUBCUTANEOUS
Qty: 0 | Refills: 0 | Status: DISCONTINUED | OUTPATIENT
Start: 2017-05-11 | End: 2017-05-11

## 2017-05-11 RX ORDER — MEPERIDINE HYDROCHLORIDE 50 MG/ML
12.5 INJECTION INTRAMUSCULAR; INTRAVENOUS; SUBCUTANEOUS
Qty: 0 | Refills: 0 | Status: DISCONTINUED | OUTPATIENT
Start: 2017-05-11 | End: 2017-05-11

## 2017-05-11 RX ORDER — OXYCODONE HYDROCHLORIDE 5 MG/1
5 TABLET ORAL EVERY 4 HOURS
Qty: 0 | Refills: 0 | Status: DISCONTINUED | OUTPATIENT
Start: 2017-05-11 | End: 2017-05-11

## 2017-05-11 RX ADMIN — SODIUM CHLORIDE 100 MILLILITER(S): 9 INJECTION, SOLUTION INTRAVENOUS at 13:29

## 2017-05-11 RX ADMIN — Medication 20 MILLIGRAM(S): at 14:35

## 2017-05-11 RX ADMIN — MORPHINE SULFATE 2 MILLIGRAM(S): 50 CAPSULE, EXTENDED RELEASE ORAL at 22:46

## 2017-05-11 RX ADMIN — OXYCODONE HYDROCHLORIDE 10 MILLIGRAM(S): 5 TABLET ORAL at 06:27

## 2017-05-11 RX ADMIN — OXYCODONE HYDROCHLORIDE 10 MILLIGRAM(S): 5 TABLET ORAL at 20:58

## 2017-05-11 RX ADMIN — Medication 1 APPLICATION(S): at 05:24

## 2017-05-11 RX ADMIN — MORPHINE SULFATE 2 MILLIGRAM(S): 50 CAPSULE, EXTENDED RELEASE ORAL at 15:51

## 2017-05-11 RX ADMIN — OXYCODONE HYDROCHLORIDE 10 MILLIGRAM(S): 5 TABLET ORAL at 14:34

## 2017-05-11 RX ADMIN — HYDROMORPHONE HYDROCHLORIDE 0.5 MILLIGRAM(S): 2 INJECTION INTRAMUSCULAR; INTRAVENOUS; SUBCUTANEOUS at 13:22

## 2017-05-11 RX ADMIN — OXYCODONE HYDROCHLORIDE 10 MILLIGRAM(S): 5 TABLET ORAL at 19:49

## 2017-05-11 RX ADMIN — PREGABALIN 500 MICROGRAM(S): 225 CAPSULE ORAL at 14:35

## 2017-05-11 RX ADMIN — SODIUM CHLORIDE 100 MILLILITER(S): 9 INJECTION, SOLUTION INTRAVENOUS at 01:45

## 2017-05-11 RX ADMIN — HYDROMORPHONE HYDROCHLORIDE 0.5 MILLIGRAM(S): 2 INJECTION INTRAMUSCULAR; INTRAVENOUS; SUBCUTANEOUS at 13:32

## 2017-05-11 RX ADMIN — OXYCODONE HYDROCHLORIDE 10 MILLIGRAM(S): 5 TABLET ORAL at 07:00

## 2017-05-11 RX ADMIN — Medication 110 MILLIGRAM(S): at 20:57

## 2017-05-11 RX ADMIN — Medication 1 APPLICATION(S): at 17:21

## 2017-05-11 RX ADMIN — MORPHINE SULFATE 2 MILLIGRAM(S): 50 CAPSULE, EXTENDED RELEASE ORAL at 19:47

## 2017-05-11 RX ADMIN — Medication 1 MILLIGRAM(S): at 14:34

## 2017-05-11 RX ADMIN — Medication 1 TABLET(S): at 14:34

## 2017-05-11 RX ADMIN — MORPHINE SULFATE 2 MILLIGRAM(S): 50 CAPSULE, EXTENDED RELEASE ORAL at 19:49

## 2017-05-11 NOTE — BRIEF OPERATIVE NOTE - OPERATION/FINDINGS
rectovaginal fistula approx. 4-6 cm from verge, opening was approx 5mm., sutures placed to close fistula

## 2017-05-11 NOTE — PROGRESS NOTE ADULT - SUBJECTIVE AND OBJECTIVE BOX
Patient is a 51y old  Female who presents with a chief complaint of Weakness, jaundice (06 May 2017 15:13)    HPI:  Patient is 52yo female with PMHx of Barretts esophagus, Endometriosis, HLD, HTN. Rectovaginal fistula, s/p recent laparoscopic examination of the abd which demonstrated endometriosis on 4/24 presents with weakness and jaundice. Pt reports on Wed, she started feeling weak, fatigued, decreased energy and noted yellowing of her skin and eyes. Pt denies fever chills, cough, chest pain, melena, dark stools. Endorses new onset SOB, without CP/palpitations. No other constitutional symptoms. Upon arrival to the ER HH noted to be 8, baseline 13, 2 weeks ago. (06 May 2017 15:13)    5/7: Pt seen and examined. States she feels a generalized weakness and feels the same as yesterday. States her jaundice has gotten better since admission.  5/8: Pt seen and examined. States she still feels a generalized weakness and feels dizzy when she stands. She passed a large, soft orange stool last night which helped relieve some of her abdominal pain.  5/9: States she feels less weak than yesterday. She is still having feces in her vaginal discharge.  5/10: States she feels much better. She is not weak and is moving around. Still has feces from her vagina and some abd/ groin pain.  5/11: Patient is feeling better. Denies weakness and abd pain. Having feces from her vagina but going to OR today for ex lap.    ROS:   All 10 systems reviewed and found to be negative with the exception of what has been described above.    MEDICATIONS  (STANDING):  oxyCODONE IR 10milliGRAM(s) Oral three times a day  FLUoxetine 20milliGRAM(s) Oral daily  multivitamin 1Tablet(s) Oral daily  dextrose 5% + sodium chloride 0.45%. 1000milliLiter(s) IV Continuous <Continuous>  hydrocortisone 2.5% Rectal Cream 1Application(s) Rectal two times a day  folic acid 1milliGRAM(s) Oral daily  cyanocobalamin 500MICROGram(s) Oral daily  sodium ferric gluconate complex IVPB 125milliGRAM(s) IV Intermittent at bedtime    MEDICATIONS  (PRN):  diazepam    Tablet 5milliGRAM(s) Oral four times a day PRN anxiety  ondansetron Injectable 4milliGRAM(s) IV Push every 6 hours PRN Nausea  zaleplon 5milliGRAM(s) Oral at bedtime PRN Insomnia  morphine  - Injectable 2milliGRAM(s) IV Push every 3 hours PRN Moderate Pain (4 - 6)    Vital Signs Last 24 Hrs  T(C): 36.2, Max: 36.8 (05-10 @ 21:48)  T(F): 97.2, Max: 98.2 (05-10 @ 21:48)  HR: 91 (72 - 103)  BP: 133/75 (92/67 - 144/80)  BP(mean): --  RR: 18 (13 - 18)  SpO2: 99% (96% - 100%)    Physical Exam  Constitutional:  Lying in bed, NAD  HEENT: EOMI, PERRL  Neck: supple   Lungs: Clear to auscultation b/l. No rales, rhonchi or wheezing.  CV: s1s2 normal. No murmurs, gallops or rubs  Gastrointestinal:  Distended, BS normal, soft and mildly tender to palpation in all 4 quadrants. No rebound or guarding.  MS: full ROM in all extremities  Ext: Non edematous   Psychiatric:  Mood and affect are normal    Labs                          9.7    5.3   )-----------( 226      ( 11 May 2017 04:55 )             30.4          05-11    145  |  108  |  8   ----------------------------<  100<H>  3.8   |  29  |  0.80    Ca    8.7      11 May 2017 04:55    TPro  6.7  /  Alb  3.5  /  TBili  0.9  /  DBili  x   /  AST  36  /  ALT  33  /  AlkPhos  91  05-11    C-Reactive Protein, Serum (05.09.17 @ 05:36)    C-Reactive Protein, Serum: 0.60 mg/dL    Sedimentation Rate, Erythrocyte (05.09.17 @ 05:36)    Sedimentation Rate, Erythrocyte: 25 mm/hr    Direct Rosio Profile (05.06.17 @ 10:30)    Dir Antiglob Polyspecific Interpretation: NEG    Haptoglobin, Serum (05.06.17 @ 10:30)    Haptoglobin, Serum: <20: test repeated mg/dL    Culture - Blood (05.06.17 @ 10:37)    Specimen Source: .Blood Blood-Peripheral - only aeraydee mar recv'd    Culture Results:   No growth to date.    Lactate Dehydrogenase, Serum (05.06.17 @ 13:13)    Lactate Dehydrogenase, Serum: 769 U/L    Reticulocyte Count (05.06.17 @ 10:30)    RBC Count: 2.35 M/uL    Reticulocyte Percent: 12.6 %    Absolute Reticulocytes: 296.6 K/uL    Urinalysis (05.06.17 @ 12:15)    Blood, Urine: Trace    Glucose Qualitative, Urine: Negative mg/dL    pH Urine: 6.0: Please Note: New Reference Range as of 4/18/17    Color: Yellow    Urine Appearance: Slightly Turbid    Bilirubin: Negative    Ketone - Urine: Negative    Specific Gravity: 1.005    Protein, Urine: Negative mg/dL    Urobilinogen: Negative mg/dL    Nitrite: Negative    Leukocyte Esterase Concentration: Moderate    Culture - Urine (05.06.17 @ 12:15)    Specimen Source: .Urine Clean Catch (Midstream)    Culture Results:   Culture grew 3 or more types of organisms which indicate  collection contamination; consider recollection only if clinically  indicated.    OP note 5/11: rectovaginal fistula approx. 4-6 cm from verge, opening was approx 5mm., sutures placed to close fistula    EXAM:  CHEST SINGLE VIEW FRONTAL                        PROCEDURE DATE:  05/07/2017    INTERPRETATION:  CHEST SINGLE VIEW FRONTAL  Single AP view  HISTORY:  weakness  Comparison:  none.  The cardiac silhouette is within normal limits. The lungs are clear. No   pleural abnormality.  IMPRESSION: Normal AP chest      Assessment: 52yo female with weakness and jaundice, admitted for anemia    # Anemia -nayana drug induced hemolytic anemia secondary to cefotetan - resolving   - HH=13, on 4/24, in ED 8 (received 1 unit PRBC).  - s/p 1 unit PRBC transfusion on admission and 1 unit PRBC  5/8  - H/H stable  - Monitor CBC, transfuse as needed  - guiac neg, denies melena  - currently afebrile, comfortable on room air  - labs demonstrate an elevated TBilli, with DB 0.5, likely signifying a hemolytic process  - Tbili and LDH trending down  - hematology consult appreciated-  Rosio negative and haptoglobin<20. Check for G6PD, check urine for hemosiderine, laboratory to do a "supersensitive" Rosio to rule out occult autoimmune process, flow to rule out mesenteric disorder  - continue folic acid and B12  - GI consult - requests dr navarro- hepatitis serologies negative, f/u hemochromatosis gene   - continue Analpram for hemorrhoids  - colorectal consult appreciated-  ex lap today identified fistula and closed with sutures   - ID consult appreciated- CMV neg, EBV neg acute. parvovirus B19 acutely negative, and HIV negative as these are known to contribute to anemia  - hold BP meds  - hold heparin    # HTN  - hold BP meds for now    # HLD  - patient not on any statins    # leukocytosis  - afebrile, HD stable, has +UA  - f/u blood cultures- no growth to date  - f/u urine culture- contamination     # UTI  - d/c Rocephin    attempted to call dtr in law sarkar who is a nurse at [phone] at patients request. left voicemail on 5/11 Patient is a 51y old  Female who presents with a chief complaint of Weakness, jaundice (06 May 2017 15:13)    HPI:  Patient is 52yo female with PMHx of Barretts esophagus, Endometriosis, HLD, HTN. Rectovaginal fistula, s/p recent laparoscopic examination of the abd which demonstrated endometriosis on 4/24 presents with weakness and jaundice. Pt reports on Wed, she started feeling weak, fatigued, decreased energy and noted yellowing of her skin and eyes. Pt denies fever chills, cough, chest pain, melena, dark stools. Endorses new onset SOB, without CP/palpitations. No other constitutional symptoms. Upon arrival to the ER HH noted to be 8, baseline 13, 2 weeks ago. (06 May 2017 15:13)    5/7: Pt seen and examined. States she feels a generalized weakness and feels the same as yesterday. States her jaundice has gotten better since admission.  5/8: Pt seen and examined. States she still feels a generalized weakness and feels dizzy when she stands. She passed a large, soft orange stool last night which helped relieve some of her abdominal pain.  5/9: States she feels less weak than yesterday. She is still having feces in her vaginal discharge.  5/10: States she feels much better. She is not weak and is moving around. Still has feces from her vagina and some abd/ groin pain.  5/11: Patient is feeling better. Denies weakness and abd pain. Having feces from her vagina but going to OR today for ex lap.    ROS:   All 10 systems reviewed and found to be negative with the exception of what has been described above.    MEDICATIONS  (STANDING):  oxyCODONE IR 10milliGRAM(s) Oral three times a day  FLUoxetine 20milliGRAM(s) Oral daily  multivitamin 1Tablet(s) Oral daily  dextrose 5% + sodium chloride 0.45%. 1000milliLiter(s) IV Continuous <Continuous>  hydrocortisone 2.5% Rectal Cream 1Application(s) Rectal two times a day  folic acid 1milliGRAM(s) Oral daily  cyanocobalamin 500MICROGram(s) Oral daily  sodium ferric gluconate complex IVPB 125milliGRAM(s) IV Intermittent at bedtime    MEDICATIONS  (PRN):  diazepam    Tablet 5milliGRAM(s) Oral four times a day PRN anxiety  ondansetron Injectable 4milliGRAM(s) IV Push every 6 hours PRN Nausea  zaleplon 5milliGRAM(s) Oral at bedtime PRN Insomnia  morphine  - Injectable 2milliGRAM(s) IV Push every 3 hours PRN Moderate Pain (4 - 6)    Vital Signs Last 24 Hrs  T(C): 36.2, Max: 36.8 (05-10 @ 21:48)  T(F): 97.2, Max: 98.2 (05-10 @ 21:48)  HR: 91 (72 - 103)  BP: 133/75 (92/67 - 144/80)  BP(mean): --  RR: 18 (13 - 18)  SpO2: 99% (96% - 100%)    Physical Exam  Constitutional:  Lying in bed, NAD  HEENT: EOMI, PERRL  Neck: supple   Lungs: Clear to auscultation b/l. No rales, rhonchi or wheezing.  CV: s1s2 normal. No murmurs, gallops or rubs  Gastrointestinal:  Distended, BS normal, soft and mildly tender to palpation in all 4 quadrants. No rebound or guarding.  MS: full ROM in all extremities  Ext: Non edematous   Psychiatric:  Mood and affect are normal    Labs                          9.7    5.3   )-----------( 226      ( 11 May 2017 04:55 )             30.4          05-11    145  |  108  |  8   ----------------------------<  100<H>  3.8   |  29  |  0.80    Ca    8.7      11 May 2017 04:55    TPro  6.7  /  Alb  3.5  /  TBili  0.9  /  DBili  x   /  AST  36  /  ALT  33  /  AlkPhos  91  05-11    C-Reactive Protein, Serum (05.09.17 @ 05:36)    C-Reactive Protein, Serum: 0.60 mg/dL    Sedimentation Rate, Erythrocyte (05.09.17 @ 05:36)    Sedimentation Rate, Erythrocyte: 25 mm/hr    Direct Rosio Profile (05.06.17 @ 10:30)    Dir Antiglob Polyspecific Interpretation: NEG    Haptoglobin, Serum (05.06.17 @ 10:30)    Haptoglobin, Serum: <20: test repeated mg/dL    Culture - Blood (05.06.17 @ 10:37)    Specimen Source: .Blood Blood-Peripheral - only aero botlle recv'd    Culture Results:   No growth to date.    Lactate Dehydrogenase, Serum (05.06.17 @ 13:13)    Lactate Dehydrogenase, Serum: 769 U/L    Reticulocyte Count (05.06.17 @ 10:30)    RBC Count: 2.35 M/uL    Reticulocyte Percent: 12.6 %    Absolute Reticulocytes: 296.6 K/uL    Urinalysis (05.06.17 @ 12:15)    Blood, Urine: Trace    Glucose Qualitative, Urine: Negative mg/dL    pH Urine: 6.0: Please Note: New Reference Range as of 4/18/17    Color: Yellow    Urine Appearance: Slightly Turbid    Bilirubin: Negative    Ketone - Urine: Negative    Specific Gravity: 1.005    Protein, Urine: Negative mg/dL    Urobilinogen: Negative mg/dL    Nitrite: Negative    Leukocyte Esterase Concentration: Moderate    Culture - Urine (05.06.17 @ 12:15)    Specimen Source: .Urine Clean Catch (Midstream)    Culture Results:   Culture grew 3 or more types of organisms which indicate  collection contamination; consider recollection only if clinically  indicated.    OP note 5/11: rectovaginal fistula approx. 4-6 cm from verge, opening was approx 5mm., sutures placed to close fistula    EXAM:  CHEST SINGLE VIEW FRONTAL                        PROCEDURE DATE:  05/07/2017    INTERPRETATION:  CHEST SINGLE VIEW FRONTAL  Single AP view  HISTORY:  weakness  Comparison:  none.  The cardiac silhouette is within normal limits. The lungs are clear. No   pleural abnormality.  IMPRESSION: Normal AP chest      Assessment: 52yo female with weakness and jaundice, admitted for anemia now s/p ex-lap for persistent colovaginal fistula (5/11)    # Anemia -nayana drug induced hemolytic anemia secondary to cefotetan - resolving   - HH=13, on 4/24, in ED 8 (received 1 unit PRBC).  - s/p 1 unit PRBC transfusion on admission and 1 unit PRBC  5/8  - H/H stable  - Monitor CBC, transfuse as needed  - guiac neg, denies melena  - currently afebrile, comfortable on room air  - labs demonstrate an elevated TBilli, with DB 0.5, likely signifying a hemolytic process  - Tbili and LDH trending down  - hematology consult appreciated-  Rosio negative and haptoglobin<20. Check for G6PD, check urine for hemosiderine, laboratory to do a "supersensitive" Rosio to rule out occult autoimmune process, flow to rule out mesenteric disorder  - continue folic acid and B12  - GI consult - requests dr navarro- hepatitis serologies negative, f/u hemochromatosis gene   - continue Analpram for hemorrhoids  - ID consult appreciated- CMV neg, EBV neg acute. parvovirus B19 acutely negative, and HIV negative as these are known to contribute to anemia  - hold BP meds  - hold heparin      # colovaginal fistula - s/p sigmoidoscopy 5/11 with dr navarro which failed to show fistula.  pt underwent exlap on 5/11 and fistula found 5-6 cm from verge and stures places.  intra op consult from gyn dr stoddard and pt f/u with dr stoddard for further f/u as outpt.     # HTN  - hold BP meds for now    # HLD  - patient not on any statins    # leukocytosis  - afebrile, HD stable, has +UA  - f/u blood cultures- no growth to date  - f/u urine culture- contamination     # UTI  - d/c Rocephin    attempted to call dtr in law sarkar who is a nurse at [phone] at patients request. left voicemail on 5/11

## 2017-05-11 NOTE — PROGRESS NOTE ADULT - SUBJECTIVE AND OBJECTIVE BOX
Pt still with complaints of stool discharge from vagina  going for flex sig this am    Vital Signs Last 24 Hrs  T(C): 36.4, Max: 36.8 (05-10 @ 21:48)  T(F): 97.5, Max: 98.2 (05-10 @ 21:48)  HR: 72 (72 - 86)  BP: 137/65 (137/65 - 144/80)  BP(mean): --  RR: 18 (17 - 18)  SpO2: 99% (96% - 100%)    NAD  abd soft                          9.7    5.3   )-----------( 226      ( 11 May 2017 04:55 )             30.4   05-11    145  |  108  |  8   ----------------------------<  100<H>  3.8   |  29  |  0.80    Ca    8.7      11 May 2017 04:55    TPro  6.7  /  Alb  3.5  /  TBili  0.9  /  DBili  x   /  AST  36  /  ALT  33  /  AlkPhos  91  05-11

## 2017-05-11 NOTE — PROGRESS NOTE ADULT - ASSESSMENT
rectovaginal fistula    plan: Exam under anesthesia today after flex sig  please keep NPO  for procedure

## 2017-05-11 NOTE — BRIEF OPERATIVE NOTE - COMMENTS
Dr. Mazariegos intra-op consult called. He will see Pt and she will need further care/surgery in the future to correct this fistula. to f/u with him as outpt

## 2017-05-12 ENCOUNTER — TRANSCRIPTION ENCOUNTER (OUTPATIENT)
Age: 52
End: 2017-05-12

## 2017-05-12 VITALS
SYSTOLIC BLOOD PRESSURE: 130 MMHG | DIASTOLIC BLOOD PRESSURE: 56 MMHG | HEART RATE: 88 BPM | TEMPERATURE: 99 F | RESPIRATION RATE: 17 BRPM

## 2017-05-12 LAB
ALBUMIN SERPL ELPH-MCNC: 3.2 G/DL — LOW (ref 3.3–5)
ALP SERPL-CCNC: 81 U/L — SIGNIFICANT CHANGE UP (ref 40–120)
ALT FLD-CCNC: 32 U/L — SIGNIFICANT CHANGE UP (ref 12–78)
ANION GAP SERPL CALC-SCNC: 8 MMOL/L — SIGNIFICANT CHANGE UP (ref 5–17)
AST SERPL-CCNC: 32 U/L — SIGNIFICANT CHANGE UP (ref 15–37)
BILIRUB SERPL-MCNC: 0.6 MG/DL — SIGNIFICANT CHANGE UP (ref 0.2–1.2)
BUN SERPL-MCNC: 9 MG/DL — SIGNIFICANT CHANGE UP (ref 7–23)
CALCIUM SERPL-MCNC: 8.6 MG/DL — SIGNIFICANT CHANGE UP (ref 8.5–10.1)
CHLORIDE SERPL-SCNC: 110 MMOL/L — HIGH (ref 96–108)
CO2 SERPL-SCNC: 28 MMOL/L — SIGNIFICANT CHANGE UP (ref 22–31)
COMMENT - PATHOLOGY: SIGNIFICANT CHANGE UP
CREAT SERPL-MCNC: 0.87 MG/DL — SIGNIFICANT CHANGE UP (ref 0.5–1.3)
GLUCOSE SERPL-MCNC: 94 MG/DL — SIGNIFICANT CHANGE UP (ref 70–99)
HCT VFR BLD CALC: 27.4 % — LOW (ref 34.5–45)
HGB BLD-MCNC: 9.2 G/DL — LOW (ref 11.5–15.5)
LDH SERPL L TO P-CCNC: 539 U/L — HIGH (ref 50–242)
MCHC RBC-ENTMCNC: 33.6 GM/DL — SIGNIFICANT CHANGE UP (ref 32–36)
MCHC RBC-ENTMCNC: 35.2 PG — HIGH (ref 27–34)
MCV RBC AUTO: 104.6 FL — HIGH (ref 80–100)
PLATELET # BLD AUTO: 168 K/UL — SIGNIFICANT CHANGE UP (ref 150–400)
PNH GRANULOCYTES: 0 % — SIGNIFICANT CHANGE UP (ref 0–0.01)
PNH MONOCYTES: 0 % — SIGNIFICANT CHANGE UP (ref 0–0.05)
PNH RBC-COMPLETE AG LOSS: 0 % — SIGNIFICANT CHANGE UP (ref 0–0.01)
PNH RBC-PARTIAL AG LOSS: 0 % — SIGNIFICANT CHANGE UP (ref 0–0.99)
POTASSIUM SERPL-MCNC: 4.3 MMOL/L — SIGNIFICANT CHANGE UP (ref 3.5–5.3)
POTASSIUM SERPL-SCNC: 4.3 MMOL/L — SIGNIFICANT CHANGE UP (ref 3.5–5.3)
PROT SERPL-MCNC: 6.1 GM/DL — SIGNIFICANT CHANGE UP (ref 6–8.3)
RBC # BLD: 2.62 M/UL — LOW (ref 3.8–5.2)
RBC # FLD: 20.7 % — HIGH (ref 10.3–14.5)
SODIUM SERPL-SCNC: 146 MMOL/L — HIGH (ref 135–145)
WBC # BLD: 4.4 K/UL — SIGNIFICANT CHANGE UP (ref 3.8–10.5)
WBC # FLD AUTO: 4.4 K/UL — SIGNIFICANT CHANGE UP (ref 3.8–10.5)

## 2017-05-12 RX ORDER — HYDROCORTISONE 1 %
1 OINTMENT (GRAM) TOPICAL
Qty: 60 | Refills: 0 | OUTPATIENT
Start: 2017-05-12

## 2017-05-12 RX ORDER — PREGABALIN 225 MG/1
1 CAPSULE ORAL
Qty: 0 | Refills: 0 | COMMUNITY
Start: 2017-05-12

## 2017-05-12 RX ORDER — FOLIC ACID 0.8 MG
1 TABLET ORAL
Qty: 0 | Refills: 0 | COMMUNITY
Start: 2017-05-12

## 2017-05-12 RX ADMIN — OXYCODONE HYDROCHLORIDE 10 MILLIGRAM(S): 5 TABLET ORAL at 03:55

## 2017-05-12 RX ADMIN — Medication 1 APPLICATION(S): at 17:04

## 2017-05-12 RX ADMIN — PREGABALIN 500 MICROGRAM(S): 225 CAPSULE ORAL at 09:31

## 2017-05-12 RX ADMIN — Medication 20 MILLIGRAM(S): at 09:31

## 2017-05-12 RX ADMIN — MORPHINE SULFATE 2 MILLIGRAM(S): 50 CAPSULE, EXTENDED RELEASE ORAL at 17:05

## 2017-05-12 RX ADMIN — MORPHINE SULFATE 2 MILLIGRAM(S): 50 CAPSULE, EXTENDED RELEASE ORAL at 08:39

## 2017-05-12 RX ADMIN — OXYCODONE HYDROCHLORIDE 10 MILLIGRAM(S): 5 TABLET ORAL at 13:15

## 2017-05-12 RX ADMIN — OXYCODONE HYDROCHLORIDE 10 MILLIGRAM(S): 5 TABLET ORAL at 13:45

## 2017-05-12 RX ADMIN — MORPHINE SULFATE 2 MILLIGRAM(S): 50 CAPSULE, EXTENDED RELEASE ORAL at 09:09

## 2017-05-12 RX ADMIN — OXYCODONE HYDROCHLORIDE 10 MILLIGRAM(S): 5 TABLET ORAL at 05:25

## 2017-05-12 RX ADMIN — Medication 1 MILLIGRAM(S): at 09:31

## 2017-05-12 RX ADMIN — Medication 1 TABLET(S): at 09:31

## 2017-05-12 NOTE — DISCHARGE NOTE ADULT - PATIENT PORTAL LINK FT
“You can access the [employer] Patient Portal, offered by [facility], by registering with the following website: [email or web address]”

## 2017-05-12 NOTE — DISCHARGE NOTE ADULT - MEDICATION SUMMARY - MEDICATIONS TO STOP TAKING
I will STOP taking the medications listed below when I get home from the hospital:    Bystolic 10 mg oral tablet  -- 1 tab(s) by mouth once a day (in the morning)    Movantik 25 mg oral tablet  -- 1 tab(s) by mouth once a day (in the morning)    Macrobid  -- 50 milligram(s) by mouth once a day

## 2017-05-12 NOTE — DISCHARGE NOTE ADULT - CARE PROVIDERS DIRECT ADDRESSES
,[email or web address]

## 2017-05-12 NOTE — PROGRESS NOTE ADULT - ASSESSMENT
rectovaginal fistula  s/p repair    Plan: may go home from colorectal surgery point of view  She will need F/u with Dr. Mazariegos GYN  high fiber diet

## 2017-05-12 NOTE — PROGRESS NOTE ADULT - SUBJECTIVE AND OBJECTIVE BOX
No overnight events    Vital Signs Last 24 Hrs  T(C): 36.7, Max: 36.7 (05-11 @ 20:17)  T(F): 98, Max: 98.1 (05-11 @ 20:17)  HR: 83 (83 - 103)  BP: 125/62 (92/67 - 143/75)  BP(mean): --  RR: 18 (13 - 18)  SpO2: 95% (95% - 100%)    NAD  abd soft                          9.2    4.4   )-----------( 168      ( 12 May 2017 05:25 )             27.4   05-12    146<H>  |  110<H>  |  9   ----------------------------<  94  4.3   |  28  |  0.87    Ca    8.6      12 May 2017 05:25    TPro  6.1  /  Alb  3.2<L>  /  TBili  0.6  /  DBili  x   /  AST  32  /  ALT  32  /  AlkPhos  81  05-12

## 2017-05-12 NOTE — DISCHARGE NOTE ADULT - HOSPITAL COURSE
50yo female, PMHx of Barretts esophagus, Endometriosis, HLD, HTN. Rectovaginal fistula, s/p recent laparoscopic examination of the abd which demonstrated endometriosis on 4/24, presented with weakness and jaundice. Admitted with drug- induced hemolytic anemia secondary to cefotetan, hospital course complicated by colovaginal fistula and UTI. On admission, H/H was 8.0/22.0 and patient was transfused with 1U PRBC. Transfused 2 days later with 1 more unit of PRBC for a total of 2 units over course. H/H increased appropriately and on d/c H/H 9.2/ 27.4. Tbili and LDH trended down. Pt had consistent feces from the vagina and had a flex sigmoidoscopy with Dr. Dickinson with no fistula found. She went to the OR with Dr. Loomis where he found a rectovaginal fistula 4-6 cm away from the anal verge that was approx 5 mm long and closed with sutures. Dr. Mazariegos GYN was called for intra op consult. Pt will f/u with Dr. Mazariegos for closure of fistula via vaginal side. Completed course of IV rocephin for UTI.     Vital Signs Last 24 Hrs  T(C): 37, Max: 37 (05-12 @ 14:05)  T(F): 98.6, Max: 98.6 (05-12 @ 14:05)  HR: 88 (83 - 90)  BP: 130/56 (119/57 - 130/56)  BP(mean): --  RR: 17 (17 - 18)  SpO2: 95% (95% - 95%)  Physical Exam  Constitutional:  Lying in bed, NAD  HEENT: EOMI, PERRL  Neck: supple   Lungs: Clear to auscultation b/l. No rales, rhonchi or wheezing.  CV: s1s2 normal. No murmurs, gallops or rubs  Gastrointestinal:  Distended, BS normal, soft and mildly tender to palpation in all 4 quadrants. No rebound or guarding.  MS: full ROM in all extremities  Ext: Non edematous   Psychiatric:  Mood and affect are normal    Assessment: 50yo female with weakness and jaundice, admitted for anemia now s/p ex-lap for persistent colovaginal fistula (5/11)    # Anemia- likely drug induced hemolytic anemia secondary to cefotetan - resolving   - HH=13, on 4/24, in ED 8 (received 1 unit PRBC).  - s/p 1 unit PRBC transfusion on admission and 1 unit PRBC  5/8  - H/H stable  - Monitor CBC, transfuse as needed  - guiac neg, denies melena  - currently afebrile, comfortable on room air  - labs demonstrate an elevated TBilli, with DB 0.5, likely signifying a hemolytic process  - Tbili and LDH trending down  - hematology consult appreciated-  Orsio negative and haptoglobin<20. Check for G6PD, check urine for hemosiderine, laboratory to do a "supersensitive" Rosio to rule out occult autoimmune process, flow to rule out mesenteric disorder  - continue folic acid and B12  - GI consult - requests dr dickinson- hepatitis serologies negative, f/u hemochromatosis gene   - continue Analpram for hemorrhoids  - ID consult appreciated- CMV neg, EBV neg acute. parvovirus B19 acutely negative, and HIV negative as these are known to contribute to anemia  - hold BP meds  - hold heparin      # colovaginal fistula - s/p sigmoidoscopy 5/11 with dr dickinson which failed to show fistula.  pt underwent exlap on 5/11 and fistula found 5-6 cm from verge and stures places.  intra op consult from gyn dr mazariegos and pt f/u with dr mazariegos for further f/u as outpt.     # HTN  - hold BP meds for now    # HLD  - patient not on any statins    # leukocytosis  - afebrile, HD stable, has +UA  - f/u blood cultures- no growth to date  - f/u urine culture- contamination     # UTI  - d/c Rocephin 50yo female, PMHx of Barretts esophagus, Endometriosis, HLD, HTN. Rectovaginal fistula, s/p recent laparoscopic examination of the abd which demonstrated endometriosis on 4/24, presented with weakness and jaundice. Admitted with drug- induced hemolytic anemia secondary to cefotetan, hospital course complicated by colovaginal fistula and UTI. On admission, H/H was 8.0/22.0 and patient was transfused with 1U PRBC. Transfused 2 days later with 1 more unit of PRBC for a total of 2 units over course. H/H increased appropriately and on d/c H/H 9.2/ 27.4. Tbili and LDH trended down which were likely elevated due to hemolytic anemia as GI workup and ID workup negative so far. after d/w josé felt this was likely due to cefotetan which she recieved during surgery during previous admission and therefore entered as allergy in system.      Pt had c/o feces from the vagina and had a flex sigmoidoscopy with Dr. Dickinson with no fistula found. She went to the OR with Dr. Loomis where he found a rectovaginal fistula 4-6 cm away from the anal verge that was approx 5 mm long and closed with sutures. Dr. Mazariegos GYN was called for intra op consult. Pt will f/u with Dr. Mazariegos as outpt. Completed course of IV rocephin for UTI.     Vital Signs Last 24 Hrs  T(C): 37, Max: 37 (05-12 @ 14:05)  T(F): 98.6, Max: 98.6 (05-12 @ 14:05)  HR: 88 (83 - 90)  BP: 130/56 (119/57 - 130/56)  BP(mean): --  RR: 17 (17 - 18)  SpO2: 95% (95% - 95%)  Physical Exam  Constitutional:  Lying in bed, NAD  HEENT: EOMI, PERRL  Neck: supple   Lungs: Clear to auscultation b/l. No rales, rhonchi or wheezing.  CV: s1s2 normal. No murmurs, gallops or rubs  Gastrointestinal:  Distended, BS normal, soft and mildly tender to palpation in all 4 quadrants. No rebound or guarding.  MS: full ROM in all extremities  Ext: Non edematous   Psychiatric:  Mood and affect are normal    Assessment: 50yo female with weakness and jaundice, admitted for anemia now s/p ex-lap for persistent colovaginal fistula (5/11)    # Anemia- likely drug induced hemolytic anemia secondary to cefotetan - resolving   - s/p 2 units this admission  - slight drop noted but pt also s/p exlap yesterday so slight component of ABL anemia, pt aware need for repeat CBC early next week with hematolgoy  - Tbili and LDH trending down- continue folic acid and B12    # colovaginal fistula - s/p sigmoidoscopy 5/11 with dr dickinson which failed to show fistula.  pt underwent exlap on 5/11 and fistula found 5-6 cm from verge and sutures places.  intra op consult from gyn dr mazariegos and pt f/u with dr mazariegos for further f/u as outpt.     # HTN  - hold BP meds for now

## 2017-05-12 NOTE — DISCHARGE NOTE ADULT - CARE PLAN
Principal Discharge DX:	Hemolytic anemia, acute  Goal:	resolve anemia  Instructions for follow-up, activity and diet:	close monitoring and follow up and see dr gonzalez for blood count on monday or tuesday of next week  Secondary Diagnosis:	S/P exploratory laparotomy  Goal:	resolution of fistula  Instructions for follow-up, activity and diet:	follow up with dr akbar and dr stoddard

## 2017-05-12 NOTE — PROGRESS NOTE ADULT - SUBJECTIVE AND OBJECTIVE BOX
Fistula identified by Dr. Loomis and sutured closed  Encouraged patient to avoid straining and maintain soft stools to allow fistula healing etc.

## 2017-05-12 NOTE — DISCHARGE NOTE ADULT - MEDICATION SUMMARY - MEDICATIONS TO TAKE
I will START or STAY ON the medications listed below when I get home from the hospital:    oxyCODONE 10 mg oral tablet  -- 10 milligram(s) by mouth 3 times a day, As Needed for pain   -- Patient usually takes 2-3 daily  -- Indication: For pain medication     Valium 5 mg oral tablet  -- 5 milligram(s) by mouth 4 times a day, As Needed for pain  -- Indication: For for anxiety    Sarafem 20 mg oral capsule  -- 1 cap(s) by mouth once a day (in the morning)  -- Indication: For ANtidepressant    hydrocortisone 2.5% rectal cream with applicator  -- 1 application rectally 2 times a day  -- Indication: For Steroid cream    cyanocobalamin 500 mcg oral tablet  -- 1 tab(s) by mouth once a day  -- Indication: For vitamin    folic acid 1 mg oral tablet  -- 1 tab(s) by mouth once a day  -- Indication: For vitamin

## 2017-05-12 NOTE — DISCHARGE NOTE ADULT - PLAN OF CARE
resolve anemia close monitoring and follow up and see dr gonzalez for blood count on monday or tuesday of next week resolution of fistula follow up with dr akbar and dr stoddard

## 2017-05-12 NOTE — DISCHARGE NOTE ADULT - CARE PROVIDER_API CALL
Madhuri Rodriguez), Medicine  [address]  [place] [address]  Phone: [phone]  Fax: [phone]    Armaan Mazariegos), Obstetrics and Gynecology  [address] [place] [address]  Phone: [phone]  Fax: [phone]    Mark Loomis), ColonRectal Surgery; Surgery  [address]  [place] [address]  Phone: [phone]  Fax: [phone]    Mk Cutler), Medical Oncology  [address] [place] [address]  Phone: [phone]  Fax: [phone]

## 2017-05-16 ENCOUNTER — APPOINTMENT (OUTPATIENT)
Dept: COLORECTAL SURGERY | Facility: CLINIC | Age: 52
End: 2017-05-16

## 2017-05-17 DIAGNOSIS — E04.1 NONTOXIC SINGLE THYROID NODULE: ICD-10-CM

## 2017-05-17 DIAGNOSIS — N82.3 FISTULA OF VAGINA TO LARGE INTESTINE: ICD-10-CM

## 2017-05-17 DIAGNOSIS — D72.829 ELEVATED WHITE BLOOD CELL COUNT, UNSPECIFIED: ICD-10-CM

## 2017-05-17 DIAGNOSIS — K21.9 GASTRO-ESOPHAGEAL REFLUX DISEASE WITHOUT ESOPHAGITIS: ICD-10-CM

## 2017-05-17 DIAGNOSIS — N39.0 URINARY TRACT INFECTION, SITE NOT SPECIFIED: ICD-10-CM

## 2017-05-17 DIAGNOSIS — N80.9 ENDOMETRIOSIS, UNSPECIFIED: ICD-10-CM

## 2017-05-17 DIAGNOSIS — K59.00 CONSTIPATION, UNSPECIFIED: ICD-10-CM

## 2017-05-17 DIAGNOSIS — K58.8 OTHER IRRITABLE BOWEL SYNDROME: ICD-10-CM

## 2017-05-17 DIAGNOSIS — Z98.890 OTHER SPECIFIED POSTPROCEDURAL STATES: ICD-10-CM

## 2017-05-17 DIAGNOSIS — E78.5 HYPERLIPIDEMIA, UNSPECIFIED: ICD-10-CM

## 2017-05-17 DIAGNOSIS — E80.6 OTHER DISORDERS OF BILIRUBIN METABOLISM: ICD-10-CM

## 2017-05-17 DIAGNOSIS — D59.8 OTHER ACQUIRED HEMOLYTIC ANEMIAS: ICD-10-CM

## 2017-05-17 DIAGNOSIS — D64.9 ANEMIA, UNSPECIFIED: ICD-10-CM

## 2017-05-17 DIAGNOSIS — K22.70 BARRETT'S ESOPHAGUS WITHOUT DYSPLASIA: ICD-10-CM

## 2017-05-17 DIAGNOSIS — K64.9 UNSPECIFIED HEMORRHOIDS: ICD-10-CM

## 2017-05-17 DIAGNOSIS — I10 ESSENTIAL (PRIMARY) HYPERTENSION: ICD-10-CM

## 2017-05-17 DIAGNOSIS — T36.1X5A ADVERSE EFFECT OF CEPHALOSPORINS AND OTHER BETA-LACTAM ANTIBIOTICS, INITIAL ENCOUNTER: ICD-10-CM

## 2017-05-17 DIAGNOSIS — Z88.2 ALLERGY STATUS TO SULFONAMIDES: ICD-10-CM

## 2017-05-17 DIAGNOSIS — Z86.018 PERSONAL HISTORY OF OTHER BENIGN NEOPLASM: ICD-10-CM

## 2017-05-17 DIAGNOSIS — E66.9 OBESITY, UNSPECIFIED: ICD-10-CM

## 2017-05-17 DIAGNOSIS — Z88.1 ALLERGY STATUS TO OTHER ANTIBIOTIC AGENTS STATUS: ICD-10-CM

## 2017-05-17 DIAGNOSIS — N39.3 STRESS INCONTINENCE (FEMALE) (MALE): ICD-10-CM

## 2017-06-29 NOTE — H&P PST ADULT - PRO INTERPRETER NEED 2
6/28 457pm    Patient LM wanting to know if Dr. Angle Freeman prescribed something for before his surgery. Please call patient. [phone]    Amee Flores    [facility]    
Attempted to call pt 5 times from different phones and could not get through. This was already taken care of on 6/27/2017.     Sent pt a my chart message.     Ketan    We tried to call you back multiple times but your phone would not accept our calls. Dr. Angle Freeman wrote you a prescription for Norco and this was sent to your pharmacy back on 6/28/2017. Please check with them in a few days to see if they received it yet.     Katarina Licona RN, [facility]  Pain Clinic Care Coordinator     
English

## 2017-07-05 ENCOUNTER — OUTPATIENT (OUTPATIENT)
Dept: OUTPATIENT SERVICES | Facility: HOSPITAL | Age: 52
LOS: 1 days | Discharge: HOME | End: 2017-07-05
Payer: COMMERCIAL

## 2017-07-05 VITALS
OXYGEN SATURATION: 99 % | TEMPERATURE: 98 F | HEART RATE: 71 BPM | DIASTOLIC BLOOD PRESSURE: 50 MMHG | RESPIRATION RATE: 18 BRPM | SYSTOLIC BLOOD PRESSURE: 101 MMHG | HEIGHT: 63 IN | WEIGHT: 190.04 LBS

## 2017-07-05 DIAGNOSIS — Z98.890 OTHER SPECIFIED POSTPROCEDURAL STATES: Chronic | ICD-10-CM

## 2017-07-05 DIAGNOSIS — Q78.0 OSTEOGENESIS IMPERFECTA: Chronic | ICD-10-CM

## 2017-07-05 DIAGNOSIS — N81.10 CYSTOCELE, UNSPECIFIED: Chronic | ICD-10-CM

## 2017-07-05 DIAGNOSIS — S96.812A STRAIN OF OTHER SPECIFIED MUSCLES AND TENDONS AT ANKLE AND FOOT LEVEL, LEFT FOOT, INITIAL ENCOUNTER: Chronic | ICD-10-CM

## 2017-07-05 DIAGNOSIS — N82.3 FISTULA OF VAGINA TO LARGE INTESTINE: ICD-10-CM

## 2017-07-05 LAB
ALLERGY+IMMUNOLOGY DIAG STUDY NOTE: (no result)
ALLERGY+IMMUNOLOGY DIAG STUDY NOTE: SIGNIFICANT CHANGE UP
ANION GAP SERPL CALC-SCNC: 7 MMOL/L — SIGNIFICANT CHANGE UP (ref 5–17)
APPEARANCE UR: (no result)
BACTERIA # UR AUTO: (no result)
BASOPHILS # BLD AUTO: 0.1 K/UL — SIGNIFICANT CHANGE UP (ref 0–0.2)
BASOPHILS NFR BLD AUTO: 1.4 % — SIGNIFICANT CHANGE UP (ref 0–2)
BILIRUB UR-MCNC: NEGATIVE — SIGNIFICANT CHANGE UP
BUN SERPL-MCNC: 10 MG/DL — SIGNIFICANT CHANGE UP (ref 7–23)
CALCIUM SERPL-MCNC: 9.3 MG/DL — SIGNIFICANT CHANGE UP (ref 8.5–10.1)
CHLORIDE SERPL-SCNC: 104 MMOL/L — SIGNIFICANT CHANGE UP (ref 96–108)
CO2 SERPL-SCNC: 31 MMOL/L — SIGNIFICANT CHANGE UP (ref 22–31)
COLOR SPEC: YELLOW — SIGNIFICANT CHANGE UP
CREAT SERPL-MCNC: 0.99 MG/DL — SIGNIFICANT CHANGE UP (ref 0.5–1.3)
DIFF PNL FLD: (no result)
DIR ANTIGLOB POLYSPECIFIC INTERPRETATION: SIGNIFICANT CHANGE UP
EOSINOPHIL # BLD AUTO: 0.1 K/UL — SIGNIFICANT CHANGE UP (ref 0–0.5)
EOSINOPHIL NFR BLD AUTO: 2.1 % — SIGNIFICANT CHANGE UP (ref 0–6)
EPI CELLS # UR: SIGNIFICANT CHANGE UP
GLUCOSE SERPL-MCNC: 94 MG/DL — SIGNIFICANT CHANGE UP (ref 70–99)
GLUCOSE UR QL: NEGATIVE MG/DL — SIGNIFICANT CHANGE UP
HCT VFR BLD CALC: 42.7 % — SIGNIFICANT CHANGE UP (ref 34.5–45)
HGB BLD-MCNC: 14.8 G/DL — SIGNIFICANT CHANGE UP (ref 11.5–15.5)
KETONES UR-MCNC: (no result)
LEUKOCYTE ESTERASE UR-ACNC: (no result)
LYMPHOCYTES # BLD AUTO: 2.3 K/UL — SIGNIFICANT CHANGE UP (ref 1–3.3)
LYMPHOCYTES # BLD AUTO: 36.7 % — SIGNIFICANT CHANGE UP (ref 13–44)
MCHC RBC-ENTMCNC: 30.8 PG — SIGNIFICANT CHANGE UP (ref 27–34)
MCHC RBC-ENTMCNC: 34.8 GM/DL — SIGNIFICANT CHANGE UP (ref 32–36)
MCV RBC AUTO: 88.5 FL — SIGNIFICANT CHANGE UP (ref 80–100)
MONOCYTES # BLD AUTO: 0.5 K/UL — SIGNIFICANT CHANGE UP (ref 0–0.9)
MONOCYTES NFR BLD AUTO: 7.3 % — SIGNIFICANT CHANGE UP (ref 2–14)
NEUTROPHILS # BLD AUTO: 3.3 K/UL — SIGNIFICANT CHANGE UP (ref 1.8–7.4)
NEUTROPHILS NFR BLD AUTO: 52.6 % — SIGNIFICANT CHANGE UP (ref 43–77)
NITRITE UR-MCNC: NEGATIVE — SIGNIFICANT CHANGE UP
PH UR: 5 — SIGNIFICANT CHANGE UP (ref 5–8)
PLATELET # BLD AUTO: 219 K/UL — SIGNIFICANT CHANGE UP (ref 150–400)
POTASSIUM SERPL-MCNC: 3.9 MMOL/L — SIGNIFICANT CHANGE UP (ref 3.5–5.3)
POTASSIUM SERPL-SCNC: 3.9 MMOL/L — SIGNIFICANT CHANGE UP (ref 3.5–5.3)
PROT UR-MCNC: 30 MG/DL
RBC # BLD: 4.82 M/UL — SIGNIFICANT CHANGE UP (ref 3.8–5.2)
RBC # FLD: 11.2 % — SIGNIFICANT CHANGE UP (ref 10.3–14.5)
RBC CASTS # UR COMP ASSIST: >50 /HPF (ref 0–4)
SODIUM SERPL-SCNC: 142 MMOL/L — SIGNIFICANT CHANGE UP (ref 135–145)
SP GR SPEC: 1.02 — SIGNIFICANT CHANGE UP (ref 1.01–1.02)
UROBILINOGEN FLD QL: NEGATIVE MG/DL — SIGNIFICANT CHANGE UP
WBC # BLD: 6.3 K/UL — SIGNIFICANT CHANGE UP (ref 3.8–10.5)
WBC # FLD AUTO: 6.3 K/UL — SIGNIFICANT CHANGE UP (ref 3.8–10.5)
WBC UR QL: SIGNIFICANT CHANGE UP

## 2017-07-05 PROCEDURE — 86077 PHYS BLOOD BANK SERV XMATCH: CPT

## 2017-07-05 RX ORDER — OXYCODONE HYDROCHLORIDE 5 MG/1
10 TABLET ORAL
Qty: 0 | Refills: 0 | COMMUNITY

## 2017-07-05 RX ORDER — DIAZEPAM 5 MG
5 TABLET ORAL
Qty: 0 | Refills: 0 | COMMUNITY

## 2017-07-05 NOTE — H&P PST ADULT - HISTORY OF PRESENT ILLNESS
51 years old female present to PST . History of fistulas. 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 Planned surgery

## 2017-07-05 NOTE — H&P PST ADULT - PSH
Aplasia ossea microplastica    Cystocele with rectocele  5/11/2017  Hepatic adenoma  resected 2006  History of rectal surgery  interstimulator for rectal incontinence  later removed 2016  S/P appendectomy  1979  S/P breast augmentation  reduction 2003  S/P colonoscopy  multiple  S/P exploratory laparotomy  cholecystectomy, ROZINA, endometriosis  S/P hysterectomy with oophorectomy  with mesh for stress incontinence  S/P LASIK surgery of both eyes  2000  S/P Nissen fundoplication (with gastrostomy tube placement)  for barrets esophagus 2006  S/P tonsillectomy  1979  Stress incontinence, female  right side of mesh "cut" adjusted  Tendon rupture, post-op  repaired right elbow 2011  Traumatic rupture of plantar fascia of left foot, initial encounter  surgery

## 2017-07-05 NOTE — H&P PST ADULT - ASSESSMENT
51 years old female present to PST prior to rectal vaginal fistula repair with Dr. Armaan Mazariegos.  Plan  Follow instructions as given at PST.  Take Bystolic on the morning of surgery  Medical clearance with Dr. Rodriguez

## 2017-07-12 RX ORDER — OXYCODONE HYDROCHLORIDE 5 MG/1
10 TABLET ORAL ONCE
Qty: 0 | Refills: 0 | Status: DISCONTINUED | OUTPATIENT
Start: 2017-07-13 | End: 2017-07-13

## 2017-07-12 RX ORDER — SODIUM CHLORIDE 9 MG/ML
1000 INJECTION, SOLUTION INTRAVENOUS
Qty: 0 | Refills: 0 | Status: DISCONTINUED | OUTPATIENT
Start: 2017-07-13 | End: 2017-07-13

## 2017-07-12 RX ORDER — FAMOTIDINE 10 MG/ML
20 INJECTION INTRAVENOUS ONCE
Qty: 0 | Refills: 0 | Status: COMPLETED | OUTPATIENT
Start: 2017-07-13 | End: 2017-07-13

## 2017-07-12 RX ORDER — ONDANSETRON 8 MG/1
4 TABLET, FILM COATED ORAL EVERY 6 HOURS
Qty: 0 | Refills: 0 | Status: DISCONTINUED | OUTPATIENT
Start: 2017-07-13 | End: 2017-07-13

## 2017-07-12 RX ORDER — ACETAMINOPHEN 500 MG
975 TABLET ORAL ONCE
Qty: 0 | Refills: 0 | Status: COMPLETED | OUTPATIENT
Start: 2017-07-13 | End: 2017-07-13

## 2017-07-12 RX ORDER — SODIUM CHLORIDE 9 MG/ML
3 INJECTION INTRAMUSCULAR; INTRAVENOUS; SUBCUTANEOUS EVERY 8 HOURS
Qty: 0 | Refills: 0 | Status: DISCONTINUED | OUTPATIENT
Start: 2017-07-13 | End: 2017-07-13

## 2017-07-13 ENCOUNTER — RESULT REVIEW (OUTPATIENT)
Age: 52
End: 2017-07-13

## 2017-07-13 ENCOUNTER — OUTPATIENT (OUTPATIENT)
Dept: OUTPATIENT SERVICES | Facility: HOSPITAL | Age: 52
LOS: 1 days | Discharge: HOME | End: 2017-07-13
Payer: COMMERCIAL

## 2017-07-13 VITALS
RESPIRATION RATE: 16 BRPM | DIASTOLIC BLOOD PRESSURE: 70 MMHG | HEIGHT: 62 IN | SYSTOLIC BLOOD PRESSURE: 146 MMHG | HEART RATE: 73 BPM | WEIGHT: 190.04 LBS | TEMPERATURE: 98 F | OXYGEN SATURATION: 95 %

## 2017-07-13 DIAGNOSIS — Q78.0 OSTEOGENESIS IMPERFECTA: Chronic | ICD-10-CM

## 2017-07-13 DIAGNOSIS — S96.812A STRAIN OF OTHER SPECIFIED MUSCLES AND TENDONS AT ANKLE AND FOOT LEVEL, LEFT FOOT, INITIAL ENCOUNTER: Chronic | ICD-10-CM

## 2017-07-13 DIAGNOSIS — N81.10 CYSTOCELE, UNSPECIFIED: Chronic | ICD-10-CM

## 2017-07-13 DIAGNOSIS — Z98.890 OTHER SPECIFIED POSTPROCEDURAL STATES: Chronic | ICD-10-CM

## 2017-07-13 LAB
ALLERGY+IMMUNOLOGY DIAG STUDY NOTE: (no result)
ALLERGY+IMMUNOLOGY DIAG STUDY NOTE: SIGNIFICANT CHANGE UP
ANTIBODY INTERPRETATION 2: SIGNIFICANT CHANGE UP
DAT IGG-SP REAG RBC-IMP: (no result)
DIR ANTIGLOB POLYSPECIFIC INTERPRETATION: (no result)
IAT COMP-SP REAG SERPL QL: SIGNIFICANT CHANGE UP

## 2017-07-13 PROCEDURE — 88304 TISSUE EXAM BY PATHOLOGIST: CPT | Mod: 26

## 2017-07-13 PROCEDURE — 86077 PHYS BLOOD BANK SERV XMATCH: CPT

## 2017-07-13 RX ORDER — OXYCODONE AND ACETAMINOPHEN 5; 325 MG/1; MG/1
2 TABLET ORAL EVERY 6 HOURS
Qty: 0 | Refills: 0 | Status: DISCONTINUED | OUTPATIENT
Start: 2017-07-13 | End: 2017-07-14

## 2017-07-13 RX ORDER — ERGOCALCIFEROL 1.25 MG/1
1 CAPSULE ORAL
Qty: 0 | Refills: 0 | COMMUNITY

## 2017-07-13 RX ORDER — IPRATROPIUM/ALBUTEROL SULFATE 18-103MCG
3 AEROSOL WITH ADAPTER (GRAM) INHALATION ONCE
Qty: 0 | Refills: 0 | Status: COMPLETED | OUTPATIENT
Start: 2017-07-13 | End: 2017-07-13

## 2017-07-13 RX ORDER — SODIUM CHLORIDE 9 MG/ML
1000 INJECTION, SOLUTION INTRAVENOUS
Qty: 0 | Refills: 0 | Status: DISCONTINUED | OUTPATIENT
Start: 2017-07-13 | End: 2017-07-14

## 2017-07-13 RX ORDER — HYDROMORPHONE HYDROCHLORIDE 2 MG/ML
1 INJECTION INTRAMUSCULAR; INTRAVENOUS; SUBCUTANEOUS EVERY 4 HOURS
Qty: 0 | Refills: 0 | Status: DISCONTINUED | OUTPATIENT
Start: 2017-07-13 | End: 2017-07-14

## 2017-07-13 RX ORDER — IBUPROFEN 200 MG
600 TABLET ORAL EVERY 6 HOURS
Qty: 0 | Refills: 0 | Status: DISCONTINUED | OUTPATIENT
Start: 2017-07-13 | End: 2017-07-14

## 2017-07-13 RX ORDER — NALOXEGOL OXALATE 12.5 MG/1
1 TABLET, FILM COATED ORAL
Qty: 0 | Refills: 0 | COMMUNITY

## 2017-07-13 RX ORDER — ONDANSETRON 8 MG/1
4 TABLET, FILM COATED ORAL EVERY 6 HOURS
Qty: 0 | Refills: 0 | Status: DISCONTINUED | OUTPATIENT
Start: 2017-07-13 | End: 2017-07-14

## 2017-07-13 RX ORDER — OXYCODONE HYDROCHLORIDE 5 MG/1
10 TABLET ORAL
Qty: 0 | Refills: 0 | COMMUNITY

## 2017-07-13 RX ORDER — FLUOXETINE HCL 10 MG
1 CAPSULE ORAL
Qty: 0 | Refills: 0 | COMMUNITY

## 2017-07-13 RX ORDER — NEBIVOLOL HYDROCHLORIDE 5 MG/1
1 TABLET ORAL
Qty: 0 | Refills: 0 | COMMUNITY

## 2017-07-13 RX ORDER — HYDROMORPHONE HYDROCHLORIDE 2 MG/ML
0.5 INJECTION INTRAMUSCULAR; INTRAVENOUS; SUBCUTANEOUS
Qty: 0 | Refills: 0 | Status: DISCONTINUED | OUTPATIENT
Start: 2017-07-13 | End: 2017-07-13

## 2017-07-13 RX ORDER — DIAZEPAM 5 MG
1 TABLET ORAL
Qty: 0 | Refills: 0 | COMMUNITY

## 2017-07-13 RX ORDER — FENTANYL CITRATE 50 UG/ML
50 INJECTION INTRAVENOUS
Qty: 0 | Refills: 0 | Status: DISCONTINUED | OUTPATIENT
Start: 2017-07-13 | End: 2017-07-13

## 2017-07-13 RX ORDER — OXYCODONE AND ACETAMINOPHEN 5; 325 MG/1; MG/1
1 TABLET ORAL EVERY 4 HOURS
Qty: 0 | Refills: 0 | Status: DISCONTINUED | OUTPATIENT
Start: 2017-07-13 | End: 2017-07-14

## 2017-07-13 RX ADMIN — FAMOTIDINE 20 MILLIGRAM(S): 10 INJECTION INTRAVENOUS at 09:48

## 2017-07-13 RX ADMIN — OXYCODONE HYDROCHLORIDE 10 MILLIGRAM(S): 5 TABLET ORAL at 09:48

## 2017-07-13 RX ADMIN — ONDANSETRON 4 MILLIGRAM(S): 8 TABLET, FILM COATED ORAL at 15:37

## 2017-07-13 RX ADMIN — SODIUM CHLORIDE 75 MILLILITER(S): 9 INJECTION, SOLUTION INTRAVENOUS at 16:02

## 2017-07-13 RX ADMIN — OXYCODONE AND ACETAMINOPHEN 2 TABLET(S): 5; 325 TABLET ORAL at 20:06

## 2017-07-13 RX ADMIN — Medication 975 MILLIGRAM(S): at 09:48

## 2017-07-13 RX ADMIN — OXYCODONE AND ACETAMINOPHEN 2 TABLET(S): 5; 325 TABLET ORAL at 21:29

## 2017-07-13 RX ADMIN — Medication 3 MILLILITER(S): at 17:49

## 2017-07-13 RX ADMIN — Medication 600 MILLIGRAM(S): at 18:43

## 2017-07-13 NOTE — ASU DISCHARGE PLAN (ADULT/PEDIATRIC). - NOTIFY
Persistent Nausea and Vomiting/Inability to Tolerate Liquids or Foods/Pain not relieved by Medications/GYN Fever>100.4/Unable to Urinate/heavy vaginal bleeding

## 2017-07-13 NOTE — BRIEF OPERATIVE NOTE - PROCEDURE
Closure of rectovaginal fistula by vaginal approach  07/13/2017  with vular fat flap  Active  TITI  Exam under anesthesia, gynecologic  07/13/2017    Active  TITI

## 2017-07-13 NOTE — ANESTHESIA FOLLOW-UP NOTE - NSEVALATIONFT_GEN_ALL_CORE
Called to see patient.  Alert, awake. /55 , HR 77, RR 10 O2sat 99% on 2 L NC.  Slight expiratory wheezing.  Patient feels it.  Denies other symptoms.

## 2017-07-13 NOTE — ASU DISCHARGE PLAN (ADULT/PEDIATRIC). - PATIENT BELONGING
patient's belongings returned/cell phone, , clothes, necklace, vera carlos manuel bag, cosmetics bag money wallet

## 2017-07-13 NOTE — ASU DISCHARGE PLAN (ADULT/PEDIATRIC). - ACTIVITY LEVEL
no tampons/nothing per vagina/exercise/nothing per rectum/no douching/weight bearing as tolerated/no intercourse

## 2017-07-14 VITALS
TEMPERATURE: 98 F | HEART RATE: 76 BPM | OXYGEN SATURATION: 100 % | SYSTOLIC BLOOD PRESSURE: 119 MMHG | DIASTOLIC BLOOD PRESSURE: 56 MMHG

## 2017-07-14 RX ADMIN — Medication 600 MILLIGRAM(S): at 05:31

## 2017-07-14 RX ADMIN — Medication 600 MILLIGRAM(S): at 12:05

## 2017-07-14 RX ADMIN — OXYCODONE AND ACETAMINOPHEN 2 TABLET(S): 5; 325 TABLET ORAL at 07:29

## 2017-07-14 RX ADMIN — HYDROMORPHONE HYDROCHLORIDE 1 MILLIGRAM(S): 2 INJECTION INTRAMUSCULAR; INTRAVENOUS; SUBCUTANEOUS at 01:49

## 2017-07-14 NOTE — PROGRESS NOTE ADULT - SUBJECTIVE AND OBJECTIVE BOX
Postoperative day: 1  surgery: repair of RVF with vulvar flap  patient resting comfortably  complaints: none -- breathing easily -- no complaint of SOB  OR findings and course d/w patient in detail -- all questions answered  nursing input solicited  Focused ROS: negative    Physical exam:    Vital Signs Last 24 Hrs  T(C): 36.7 (14 Jul 2017 05:17), Max: 37.2 (13 Jul 2017 20:53)  T(F): 98.1 (14 Jul 2017 05:17), Max: 98.9 (13 Jul 2017 20:53)  HR: 76 (14 Jul 2017 05:17) (73 - 87)  BP: 119/56 (14 Jul 2017 05:17) (101/48 - 146/70)  BP(mean): --  RR: 8 (13 Jul 2017 17:00) (8 - 16)  SpO2: 100% (14 Jul 2017 05:17) (95% - 100%)      Abdomen: Soft,  appropriately tender, non-distended  Incision is clean dry and intact  Vagina: minimal bleeding -- vular HW  Ext: lower extremities symmetric and without calf tenderness    LABS:            Allergies    cefotetan (Other)  cephalosporins (Other)  penicillins (Other)  sulfa drugs (Anaphylaxis)    Intolerances          Assessment and Plan  Doing well.  Tolerating regular diet.  Routine post op care.  Plan discharge home  --- routine restrictions  patient given written and verbal discharge instructions

## 2017-07-17 LAB — SURGICAL PATHOLOGY FINAL REPORT - CH: SIGNIFICANT CHANGE UP

## 2017-07-21 DIAGNOSIS — N82.3 FISTULA OF VAGINA TO LARGE INTESTINE: ICD-10-CM

## 2017-07-21 DIAGNOSIS — I10 ESSENTIAL (PRIMARY) HYPERTENSION: ICD-10-CM

## 2017-07-21 DIAGNOSIS — E78.5 HYPERLIPIDEMIA, UNSPECIFIED: ICD-10-CM

## 2017-07-21 DIAGNOSIS — E66.9 OBESITY, UNSPECIFIED: ICD-10-CM

## 2017-07-21 DIAGNOSIS — Z87.891 PERSONAL HISTORY OF NICOTINE DEPENDENCE: ICD-10-CM

## 2017-07-21 DIAGNOSIS — Z88.2 ALLERGY STATUS TO SULFONAMIDES: ICD-10-CM

## 2017-07-21 DIAGNOSIS — Z88.0 ALLERGY STATUS TO PENICILLIN: ICD-10-CM

## 2017-07-21 DIAGNOSIS — Z88.1 ALLERGY STATUS TO OTHER ANTIBIOTIC AGENTS STATUS: ICD-10-CM

## 2017-10-06 ENCOUNTER — APPOINTMENT (OUTPATIENT)
Dept: MAMMOGRAPHY | Facility: CLINIC | Age: 52
End: 2017-10-06
Payer: COMMERCIAL

## 2017-10-06 ENCOUNTER — OUTPATIENT (OUTPATIENT)
Dept: OUTPATIENT SERVICES | Facility: HOSPITAL | Age: 52
LOS: 1 days | End: 2017-10-06
Payer: COMMERCIAL

## 2017-10-06 DIAGNOSIS — N81.10 CYSTOCELE, UNSPECIFIED: Chronic | ICD-10-CM

## 2017-10-06 DIAGNOSIS — Z98.890 OTHER SPECIFIED POSTPROCEDURAL STATES: Chronic | ICD-10-CM

## 2017-10-06 DIAGNOSIS — S96.812A STRAIN OF OTHER SPECIFIED MUSCLES AND TENDONS AT ANKLE AND FOOT LEVEL, LEFT FOOT, INITIAL ENCOUNTER: Chronic | ICD-10-CM

## 2017-10-06 DIAGNOSIS — Z00.8 ENCOUNTER FOR OTHER GENERAL EXAMINATION: ICD-10-CM

## 2017-10-06 DIAGNOSIS — Q78.0 OSTEOGENESIS IMPERFECTA: Chronic | ICD-10-CM

## 2017-10-06 PROCEDURE — G0202: CPT | Mod: 26

## 2017-10-06 PROCEDURE — 77063 BREAST TOMOSYNTHESIS BI: CPT | Mod: 26

## 2017-10-06 PROCEDURE — 77063 BREAST TOMOSYNTHESIS BI: CPT

## 2017-10-06 PROCEDURE — 77067 SCR MAMMO BI INCL CAD: CPT

## 2017-10-12 ENCOUNTER — APPOINTMENT (OUTPATIENT)
Dept: ULTRASOUND IMAGING | Facility: CLINIC | Age: 52
End: 2017-10-12
Payer: COMMERCIAL

## 2017-10-12 ENCOUNTER — OUTPATIENT (OUTPATIENT)
Dept: OUTPATIENT SERVICES | Facility: HOSPITAL | Age: 52
LOS: 1 days | End: 2017-10-12
Payer: COMMERCIAL

## 2017-10-12 ENCOUNTER — APPOINTMENT (OUTPATIENT)
Dept: MAMMOGRAPHY | Facility: CLINIC | Age: 52
End: 2017-10-12
Payer: COMMERCIAL

## 2017-10-12 DIAGNOSIS — Q78.0 OSTEOGENESIS IMPERFECTA: Chronic | ICD-10-CM

## 2017-10-12 DIAGNOSIS — Z98.890 OTHER SPECIFIED POSTPROCEDURAL STATES: Chronic | ICD-10-CM

## 2017-10-12 DIAGNOSIS — S96.812A STRAIN OF OTHER SPECIFIED MUSCLES AND TENDONS AT ANKLE AND FOOT LEVEL, LEFT FOOT, INITIAL ENCOUNTER: Chronic | ICD-10-CM

## 2017-10-12 DIAGNOSIS — Z00.8 ENCOUNTER FOR OTHER GENERAL EXAMINATION: ICD-10-CM

## 2017-10-12 DIAGNOSIS — N81.10 CYSTOCELE, UNSPECIFIED: Chronic | ICD-10-CM

## 2017-10-12 PROCEDURE — G0279: CPT | Mod: 26

## 2017-10-12 PROCEDURE — G0279: CPT

## 2017-10-12 PROCEDURE — G0206: CPT | Mod: 26,RT

## 2017-10-12 PROCEDURE — 76641 ULTRASOUND BREAST COMPLETE: CPT

## 2017-10-12 PROCEDURE — 76641 ULTRASOUND BREAST COMPLETE: CPT | Mod: 26,RT

## 2017-10-12 PROCEDURE — 77065 DX MAMMO INCL CAD UNI: CPT

## 2017-11-07 ENCOUNTER — OUTPATIENT (OUTPATIENT)
Dept: OUTPATIENT SERVICES | Facility: HOSPITAL | Age: 52
LOS: 1 days | Discharge: HOME | End: 2017-11-07
Payer: COMMERCIAL

## 2017-11-07 VITALS
RESPIRATION RATE: 20 BRPM | OXYGEN SATURATION: 98 % | WEIGHT: 184.97 LBS | HEIGHT: 62 IN | TEMPERATURE: 98 F | SYSTOLIC BLOOD PRESSURE: 135 MMHG | HEART RATE: 67 BPM | DIASTOLIC BLOOD PRESSURE: 77 MMHG

## 2017-11-07 DIAGNOSIS — Z98.890 OTHER SPECIFIED POSTPROCEDURAL STATES: Chronic | ICD-10-CM

## 2017-11-07 DIAGNOSIS — E66.01 MORBID (SEVERE) OBESITY DUE TO EXCESS CALORIES: ICD-10-CM

## 2017-11-07 DIAGNOSIS — N81.10 CYSTOCELE, UNSPECIFIED: Chronic | ICD-10-CM

## 2017-11-07 DIAGNOSIS — K21.9 GASTRO-ESOPHAGEAL REFLUX DISEASE WITHOUT ESOPHAGITIS: ICD-10-CM

## 2017-11-07 DIAGNOSIS — N82.3 FISTULA OF VAGINA TO LARGE INTESTINE: Chronic | ICD-10-CM

## 2017-11-07 DIAGNOSIS — Q78.0 OSTEOGENESIS IMPERFECTA: Chronic | ICD-10-CM

## 2017-11-07 DIAGNOSIS — S96.812A STRAIN OF OTHER SPECIFIED MUSCLES AND TENDONS AT ANKLE AND FOOT LEVEL, LEFT FOOT, INITIAL ENCOUNTER: Chronic | ICD-10-CM

## 2017-11-07 LAB
ANION GAP SERPL CALC-SCNC: 7 MMOL/L — SIGNIFICANT CHANGE UP (ref 5–17)
APPEARANCE UR: CLEAR — SIGNIFICANT CHANGE UP
APTT BLD: 33.1 SEC — SIGNIFICANT CHANGE UP (ref 27.5–37.4)
BACTERIA # UR AUTO: (no result)
BASOPHILS # BLD AUTO: 0.1 K/UL — SIGNIFICANT CHANGE UP (ref 0–0.2)
BASOPHILS NFR BLD AUTO: 1.8 % — SIGNIFICANT CHANGE UP (ref 0–2)
BILIRUB UR-MCNC: NEGATIVE — SIGNIFICANT CHANGE UP
BUN SERPL-MCNC: 13 MG/DL — SIGNIFICANT CHANGE UP (ref 7–23)
CALCIUM SERPL-MCNC: 8.9 MG/DL — SIGNIFICANT CHANGE UP (ref 8.5–10.1)
CHLORIDE SERPL-SCNC: 107 MMOL/L — SIGNIFICANT CHANGE UP (ref 96–108)
CO2 SERPL-SCNC: 25 MMOL/L — SIGNIFICANT CHANGE UP (ref 22–31)
COLOR SPEC: YELLOW — SIGNIFICANT CHANGE UP
CREAT SERPL-MCNC: 0.95 MG/DL — SIGNIFICANT CHANGE UP (ref 0.5–1.3)
DIFF PNL FLD: (no result)
EOSINOPHIL # BLD AUTO: 0.1 K/UL — SIGNIFICANT CHANGE UP (ref 0–0.5)
EOSINOPHIL NFR BLD AUTO: 1.8 % — SIGNIFICANT CHANGE UP (ref 0–6)
EPI CELLS # UR: SIGNIFICANT CHANGE UP
GLUCOSE SERPL-MCNC: 104 MG/DL — HIGH (ref 70–99)
GLUCOSE UR QL: NEGATIVE MG/DL — SIGNIFICANT CHANGE UP
HCT VFR BLD CALC: 42.9 % — SIGNIFICANT CHANGE UP (ref 34.5–45)
HGB BLD-MCNC: 14.5 G/DL — SIGNIFICANT CHANGE UP (ref 11.5–15.5)
INR BLD: 1.07 RATIO — SIGNIFICANT CHANGE UP (ref 0.88–1.16)
KETONES UR-MCNC: NEGATIVE — SIGNIFICANT CHANGE UP
LEUKOCYTE ESTERASE UR-ACNC: (no result)
LYMPHOCYTES # BLD AUTO: 2.1 K/UL — SIGNIFICANT CHANGE UP (ref 1–3.3)
LYMPHOCYTES # BLD AUTO: 34.1 % — SIGNIFICANT CHANGE UP (ref 13–44)
MCHC RBC-ENTMCNC: 29.4 PG — SIGNIFICANT CHANGE UP (ref 27–34)
MCHC RBC-ENTMCNC: 33.7 GM/DL — SIGNIFICANT CHANGE UP (ref 32–36)
MCV RBC AUTO: 87.2 FL — SIGNIFICANT CHANGE UP (ref 80–100)
MONOCYTES # BLD AUTO: 0.4 K/UL — SIGNIFICANT CHANGE UP (ref 0–0.9)
MONOCYTES NFR BLD AUTO: 7 % — SIGNIFICANT CHANGE UP (ref 2–14)
NEUTROPHILS # BLD AUTO: 3.4 K/UL — SIGNIFICANT CHANGE UP (ref 1.8–7.4)
NEUTROPHILS NFR BLD AUTO: 55.3 % — SIGNIFICANT CHANGE UP (ref 43–77)
NITRITE UR-MCNC: NEGATIVE — SIGNIFICANT CHANGE UP
PH UR: 5 — SIGNIFICANT CHANGE UP (ref 5–8)
PLATELET # BLD AUTO: 206 K/UL — SIGNIFICANT CHANGE UP (ref 150–400)
POTASSIUM SERPL-MCNC: 4 MMOL/L — SIGNIFICANT CHANGE UP (ref 3.5–5.3)
POTASSIUM SERPL-SCNC: 4 MMOL/L — SIGNIFICANT CHANGE UP (ref 3.5–5.3)
PROT UR-MCNC: 15 MG/DL
PROTHROM AB SERPL-ACNC: 11.6 SEC — SIGNIFICANT CHANGE UP (ref 9.8–12.7)
RBC # BLD: 4.92 M/UL — SIGNIFICANT CHANGE UP (ref 3.8–5.2)
RBC # FLD: 11.8 % — SIGNIFICANT CHANGE UP (ref 10.3–14.5)
RBC CASTS # UR COMP ASSIST: NEGATIVE /HPF — SIGNIFICANT CHANGE UP (ref 0–4)
SODIUM SERPL-SCNC: 139 MMOL/L — SIGNIFICANT CHANGE UP (ref 135–145)
SP GR SPEC: 1.02 — SIGNIFICANT CHANGE UP (ref 1.01–1.02)
UROBILINOGEN FLD QL: NEGATIVE MG/DL — SIGNIFICANT CHANGE UP
WBC # BLD: 6.1 K/UL — SIGNIFICANT CHANGE UP (ref 3.8–10.5)
WBC # FLD AUTO: 6.1 K/UL — SIGNIFICANT CHANGE UP (ref 3.8–10.5)
WBC UR QL: SIGNIFICANT CHANGE UP

## 2017-11-07 PROCEDURE — 93010 ELECTROCARDIOGRAM REPORT: CPT

## 2017-11-07 NOTE — H&P PST ADULT - NSANTHOSAYNRD_GEN_A_CORE
No. JESSI screening performed.  STOP BANG Legend: 0-2 = LOW Risk; 3-4 = INTERMEDIATE Risk; 5-8 = HIGH Risk

## 2017-11-07 NOTE — H&P PST ADULT - PMH
Acid reflux    Ahn esophagus    Chronic UTI    Constipation    Endometriosis  Hysterectomy  Hemorrhoids, unspecified hemorrhoid type    Hepatic adenoma  History of. 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

## 2017-11-07 NOTE — H&P PST ADULT - PSH
Aplasia ossea microplastica    Cystocele with rectocele  5/11/2017  Hepatic adenoma  resected 2006  History of rectal surgery  interstimulator for rectal incontinence  later removed 2016  Rectovaginal fistula  surgery - 07/2017  S/P appendectomy  1979  S/P breast augmentation  reduction 2003  S/P colonoscopy  multiple  S/P exploratory laparotomy  cholecystectomy, ROZINA, endometriosis  S/P hysterectomy with oophorectomy  with mesh for stress incontinence  S/P LASIK surgery of both eyes  2000  S/P Nissen fundoplication (with gastrostomy tube placement)  for barrets esophagus 2006  S/P tonsillectomy  1979  Stress incontinence, female  right side of mesh "cut" adjusted  Tendon rupture, post-op  repaired right elbow 2011  Traumatic rupture of plantar fascia of left foot, initial encounter  surgery

## 2017-11-07 NOTE — H&P PST ADULT - HISTORY OF PRESENT ILLNESS
52 y/o female with history of suggs's esophagus, GERD and obesity. Pt stated "I am planning for gastric bypass, so I need endoscopy." Here today for PST prior to upper endoscopy.

## 2017-11-07 NOTE — H&P PST ADULT - ASSESSMENT
52 y/o female with history of suggs's esophagus, GERD and obesity. Pt stated "I am planning for gastric bypass, so I need endoscopy." Scheduled for upper endoscopy with Dr. Horan.    Plan:    1. NPO post midnight  2. Follow preop medication instructions from Dr. Horan

## 2017-11-10 ENCOUNTER — RESULT REVIEW (OUTPATIENT)
Age: 52
End: 2017-11-10

## 2017-11-10 ENCOUNTER — OUTPATIENT (OUTPATIENT)
Dept: OUTPATIENT SERVICES | Facility: HOSPITAL | Age: 52
LOS: 1 days | Discharge: HOME | End: 2017-11-10
Payer: COMMERCIAL

## 2017-11-10 VITALS — HEIGHT: 62 IN | WEIGHT: 184.97 LBS

## 2017-11-10 DIAGNOSIS — Q78.0 OSTEOGENESIS IMPERFECTA: Chronic | ICD-10-CM

## 2017-11-10 DIAGNOSIS — Z98.890 OTHER SPECIFIED POSTPROCEDURAL STATES: Chronic | ICD-10-CM

## 2017-11-10 DIAGNOSIS — N81.10 CYSTOCELE, UNSPECIFIED: Chronic | ICD-10-CM

## 2017-11-10 DIAGNOSIS — N82.3 FISTULA OF VAGINA TO LARGE INTESTINE: Chronic | ICD-10-CM

## 2017-11-10 DIAGNOSIS — S96.812A STRAIN OF OTHER SPECIFIED MUSCLES AND TENDONS AT ANKLE AND FOOT LEVEL, LEFT FOOT, INITIAL ENCOUNTER: Chronic | ICD-10-CM

## 2017-11-10 PROCEDURE — 88312 SPECIAL STAINS GROUP 1: CPT | Mod: 26

## 2017-11-10 PROCEDURE — 88305 TISSUE EXAM BY PATHOLOGIST: CPT | Mod: 26

## 2017-11-10 PROCEDURE — 88313 SPECIAL STAINS GROUP 2: CPT | Mod: 26

## 2017-11-10 RX ORDER — SODIUM CHLORIDE 9 MG/ML
1000 INJECTION INTRAMUSCULAR; INTRAVENOUS; SUBCUTANEOUS
Qty: 0 | Refills: 0 | Status: DISCONTINUED | OUTPATIENT
Start: 2017-11-10 | End: 2017-11-25

## 2017-11-10 RX ORDER — SODIUM CHLORIDE 9 MG/ML
1000 INJECTION, SOLUTION INTRAVENOUS
Qty: 0 | Refills: 0 | Status: DISCONTINUED | OUTPATIENT
Start: 2017-11-10 | End: 2017-11-25

## 2017-11-10 RX ADMIN — SODIUM CHLORIDE 75 MILLILITER(S): 9 INJECTION INTRAMUSCULAR; INTRAVENOUS; SUBCUTANEOUS at 09:03

## 2017-11-14 DIAGNOSIS — Z88.1 ALLERGY STATUS TO OTHER ANTIBIOTIC AGENTS STATUS: ICD-10-CM

## 2017-11-14 DIAGNOSIS — K29.50 UNSPECIFIED CHRONIC GASTRITIS WITHOUT BLEEDING: ICD-10-CM

## 2017-11-14 DIAGNOSIS — Z88.2 ALLERGY STATUS TO SULFONAMIDES: ICD-10-CM

## 2017-11-14 DIAGNOSIS — K21.0 GASTRO-ESOPHAGEAL REFLUX DISEASE WITH ESOPHAGITIS: ICD-10-CM

## 2017-11-14 DIAGNOSIS — Z88.0 ALLERGY STATUS TO PENICILLIN: ICD-10-CM

## 2017-11-14 DIAGNOSIS — K21.9 GASTRO-ESOPHAGEAL REFLUX DISEASE WITHOUT ESOPHAGITIS: ICD-10-CM

## 2017-11-14 LAB — SURGICAL PATHOLOGY FINAL REPORT - CH: SIGNIFICANT CHANGE UP

## 2018-02-20 NOTE — ASU PATIENT PROFILE, ADULT - TRANSFUSION PREMEDICATION REQUIRED
"Amos Walker's goals for this visit include: Wound check   He requests these members of his care team be copied on today's visit information: yes    PCP: Racheal Swift    Referring Provider:  Referred Self, MD  No address on file    Chief Complaint   Patient presents with     RECHECK     Wound check       Initial /61  Pulse 106  SpO2 98% Estimated body mass index is 22.38 kg/(m^2) as calculated from the following:    Height as of 1/22/18: 1.892 m (6' 2.5\").    Weight as of 1/22/18: 80.2 kg (176 lb 11.2 oz).  Medication Reconciliation: complete    "
none

## 2018-02-26 ENCOUNTER — APPOINTMENT (OUTPATIENT)
Dept: BARIATRICS/WEIGHT MGMT | Facility: CLINIC | Age: 53
End: 2018-02-26
Payer: COMMERCIAL

## 2018-02-26 VITALS
BODY MASS INDEX: 35.59 KG/M2 | DIASTOLIC BLOOD PRESSURE: 80 MMHG | WEIGHT: 188.5 LBS | SYSTOLIC BLOOD PRESSURE: 110 MMHG | HEIGHT: 61 IN

## 2018-02-26 DIAGNOSIS — Z80.49 FAMILY HISTORY OF MALIGNANT NEOPLASM OF OTHER GENITAL ORGANS: ICD-10-CM

## 2018-02-26 DIAGNOSIS — Z82.3 FAMILY HISTORY OF STROKE: ICD-10-CM

## 2018-02-26 DIAGNOSIS — E07.9 DISORDER OF THYROID, UNSPECIFIED: ICD-10-CM

## 2018-02-26 DIAGNOSIS — Z83.49 FAMILY HISTORY OF OTHER ENDOCRINE, NUTRITIONAL AND METABOLIC DISEASES: ICD-10-CM

## 2018-02-26 DIAGNOSIS — Z13.220 ENCOUNTER FOR SCREENING FOR LIPOID DISORDERS: ICD-10-CM

## 2018-02-26 DIAGNOSIS — E88.81 METABOLIC SYNDROME: ICD-10-CM

## 2018-02-26 DIAGNOSIS — Z82.49 FAMILY HISTORY OF ISCHEMIC HEART DISEASE AND OTHER DISEASES OF THE CIRCULATORY SYSTEM: ICD-10-CM

## 2018-02-26 DIAGNOSIS — I10 ESSENTIAL (PRIMARY) HYPERTENSION: ICD-10-CM

## 2018-02-26 PROCEDURE — 99205 OFFICE O/P NEW HI 60 MIN: CPT | Mod: 25

## 2018-02-26 PROCEDURE — 94690 O2 UPTK REST INDIRECT: CPT

## 2018-02-26 RX ORDER — FUROSEMIDE 20 MG/1
20 TABLET ORAL
Qty: 30 | Refills: 0 | Status: DISCONTINUED | COMMUNITY
Start: 2016-11-25 | End: 2018-02-26

## 2018-02-26 RX ORDER — LIDOCAINE 5 G/100G
5 OINTMENT TOPICAL
Qty: 71 | Refills: 0 | Status: COMPLETED | COMMUNITY
Start: 2016-08-26 | End: 2018-02-26

## 2018-02-26 RX ORDER — LEVOFLOXACIN 750 MG/1
750 TABLET, FILM COATED ORAL
Qty: 5 | Refills: 0 | Status: DISCONTINUED | COMMUNITY
Start: 2016-11-17 | End: 2018-02-26

## 2018-02-26 RX ORDER — METRONIDAZOLE 500 MG/1
500 TABLET ORAL
Qty: 3 | Refills: 0 | Status: COMPLETED | COMMUNITY
Start: 2017-02-21 | End: 2018-02-26

## 2018-02-26 RX ORDER — AZITHROMYCIN 250 MG/1
250 TABLET, FILM COATED ORAL
Qty: 6 | Refills: 0 | Status: COMPLETED | COMMUNITY
Start: 2016-09-30 | End: 2018-02-26

## 2018-02-26 RX ORDER — SODIUM PHOSPHATE, MONOBASIC, MONOHYDRATE, SODIUM PHOSPHATE, DIBASIC ANHYDROUS 1.102; .398 G/1; G/1
1.102-0.398 TABLET ORAL
Qty: 32 | Refills: 0 | Status: COMPLETED | COMMUNITY
Start: 2017-01-26 | End: 2018-02-26

## 2018-02-26 RX ORDER — POTASSIUM CHLORIDE 750 MG/1
10 TABLET, EXTENDED RELEASE ORAL
Qty: 60 | Refills: 0 | Status: COMPLETED | COMMUNITY
Start: 2016-11-25 | End: 2018-02-26

## 2018-02-26 RX ORDER — ONDANSETRON 4 MG/1
4 TABLET, ORALLY DISINTEGRATING ORAL
Qty: 30 | Refills: 0 | Status: COMPLETED | COMMUNITY
Start: 2016-12-12 | End: 2018-02-26

## 2018-02-26 RX ORDER — FLUTICASONE PROPIONATE 50 UG/1
50 SPRAY, METERED NASAL
Qty: 16 | Refills: 0 | Status: COMPLETED | COMMUNITY
Start: 2016-09-30 | End: 2018-02-26

## 2018-02-26 RX ORDER — METHENAMINE HIPPURATE 1 G/1
1 TABLET ORAL
Qty: 90 | Refills: 0 | Status: COMPLETED | COMMUNITY
Start: 2017-02-17 | End: 2018-02-26

## 2018-02-26 RX ORDER — EZETIMIBE 10 MG/1
10 TABLET ORAL
Qty: 30 | Refills: 0 | Status: COMPLETED | COMMUNITY
Start: 2016-11-26 | End: 2018-02-26

## 2018-02-26 RX ORDER — NEOMYCIN SULFATE 500 MG/1
500 TABLET ORAL
Qty: 6 | Refills: 0 | Status: COMPLETED | COMMUNITY
Start: 2017-02-21 | End: 2018-02-26

## 2018-02-26 RX ORDER — SODIUM PHOSPHATE, MONOBASIC, MONOHYDRATE, SODIUM PHOSPHATE, DIBASIC ANHYDROUS 1.102; .398 G/1; G/1
1.102-0.398 TABLET ORAL
Qty: 32 | Refills: 0 | Status: COMPLETED | COMMUNITY
Start: 2017-02-20 | End: 2018-02-26

## 2018-02-26 RX ORDER — NALOXONE HYDROCHLORIDE 0.4 MG/1
0.4 INJECTION, SOLUTION INTRAMUSCULAR; SUBCUTANEOUS
Qty: 1 | Refills: 0 | Status: COMPLETED | COMMUNITY
Start: 2016-12-07 | End: 2018-02-26

## 2018-02-26 RX ORDER — AMOXICILLIN 500 MG/1
500 CAPSULE ORAL
Qty: 30 | Refills: 0 | Status: COMPLETED | COMMUNITY
Start: 2016-12-02 | End: 2018-02-26

## 2018-02-26 RX ORDER — CEPHALEXIN 500 MG/1
500 CAPSULE ORAL
Qty: 14 | Refills: 0 | Status: COMPLETED | COMMUNITY
Start: 2016-11-10 | End: 2018-02-26

## 2018-02-26 RX ORDER — DEXAMETHASONE 1 MG/1
1 TABLET ORAL
Qty: 1 | Refills: 0 | Status: COMPLETED | COMMUNITY
Start: 2016-09-08 | End: 2018-02-26

## 2018-02-27 PROBLEM — E88.81 INSULIN RESISTANCE: Status: ACTIVE | Noted: 2018-02-26

## 2018-03-16 LAB
ALBUMIN SERPL ELPH-MCNC: 4.8 G/DL
ALP BLD-CCNC: 86 U/L
ALT SERPL-CCNC: 19 U/L
ANION GAP SERPL CALC-SCNC: 16 MMOL/L
AST SERPL-CCNC: 25 U/L
BASOPHILS # BLD AUTO: 0.05 K/UL
BASOPHILS NFR BLD AUTO: 0.8 %
BILIRUB SERPL-MCNC: 0.4 MG/DL
BUN SERPL-MCNC: 18 MG/DL
CALCIUM SERPL-MCNC: 10.4 MG/DL
CHLORIDE SERPL-SCNC: 104 MMOL/L
CHOLEST SERPL-MCNC: 242 MG/DL
CHOLEST/HDLC SERPL: 4.3 RATIO
CO2 SERPL-SCNC: 22 MMOL/L
CREAT SERPL-MCNC: 1.09 MG/DL
CRP SERPL HS-MCNC: 1.4 MG/L
EOSINOPHIL # BLD AUTO: 0.03 K/UL
EOSINOPHIL NFR BLD AUTO: 0.5 %
GLUCOSE SERPL-MCNC: 98 MG/DL
HBA1C MFR BLD HPLC: 5.3 %
HCT VFR BLD CALC: 43.3 %
HDLC SERPL-MCNC: 56 MG/DL
HGB BLD-MCNC: 14.5 G/DL
IMM GRANULOCYTES NFR BLD AUTO: 0 %
INSULIN SERPL-MCNC: 12.5 UU/ML
LDLC SERPL CALC-MCNC: 168 MG/DL
LYMPHOCYTES # BLD AUTO: 1.96 K/UL
LYMPHOCYTES NFR BLD AUTO: 32.8 %
MAN DIFF?: NORMAL
MCHC RBC-ENTMCNC: 29.7 PG
MCHC RBC-ENTMCNC: 33.5 GM/DL
MCV RBC AUTO: 88.5 FL
MONOCYTES # BLD AUTO: 0.43 K/UL
MONOCYTES NFR BLD AUTO: 7.2 %
NEUTROPHILS # BLD AUTO: 3.5 K/UL
NEUTROPHILS NFR BLD AUTO: 58.7 %
PLATELET # BLD AUTO: 232 K/UL
POTASSIUM SERPL-SCNC: 4.4 MMOL/L
PROT SERPL-MCNC: 8.1 G/DL
RBC # BLD: 4.89 M/UL
RBC # FLD: 13.2 %
SODIUM SERPL-SCNC: 142 MMOL/L
T3FREE SERPL-MCNC: 2.7 PG/ML
T4 FREE SERPL-MCNC: 1.2 NG/DL
THYROGLOB AB SERPL-ACNC: <20 IU/ML
THYROPEROXIDASE AB SERPL IA-ACNC: 13.2 IU/ML
TRIGL SERPL-MCNC: 90 MG/DL
TSH SERPL-ACNC: 1 UIU/ML
WBC # FLD AUTO: 5.97 K/UL

## 2018-03-19 NOTE — ASU PATIENT PROFILE, ADULT - PMH
Opacification not visually significant. Acid reflux    Ahn esophagus    Chronic UTI    Constipation    Endometriosis  Hysterectomy  Hemorrhoids, unspecified hemorrhoid type    Hepatic adenoma  History of. 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

## 2018-04-02 ENCOUNTER — APPOINTMENT (OUTPATIENT)
Dept: BARIATRICS/WEIGHT MGMT | Facility: CLINIC | Age: 53
End: 2018-04-02
Payer: COMMERCIAL

## 2018-04-02 VITALS
DIASTOLIC BLOOD PRESSURE: 78 MMHG | HEIGHT: 61 IN | SYSTOLIC BLOOD PRESSURE: 120 MMHG | WEIGHT: 178 LBS | BODY MASS INDEX: 33.61 KG/M2

## 2018-04-02 PROCEDURE — 99214 OFFICE O/P EST MOD 30 MIN: CPT

## 2018-04-04 NOTE — H&P PST ADULT - WEIGHT IN LBS
Problem: Mobility  Goal: Risk for activity intolerance will decrease  Patient sat at edge of bed with feet on floor for 85 minutes while he visited with daughter. Patient tolerated well and was able to assist to sitting position. Getting back to bed was a little more difficult as patient did not have the strength to pull himself up, x3 assist back to bed.    Problem: Skin Integrity  Goal: Risk for impaired skin integrity will decrease  Patient reluctant to skin assessment by this RN. Not able to fully assess wound to left posterior thigh as patient was getting back into bed but new mepilex applied to wound area. Sacrum appeared to be red but blanching, again not thorough assessment able to be done as patient did not permit. Patient has been refusing [id number].        184.9

## 2018-04-27 ENCOUNTER — APPOINTMENT (OUTPATIENT)
Dept: UROLOGY | Facility: CLINIC | Age: 53
End: 2018-04-27
Payer: COMMERCIAL

## 2018-04-27 PROCEDURE — 99214 OFFICE O/P EST MOD 30 MIN: CPT

## 2018-05-02 ENCOUNTER — OUTPATIENT (OUTPATIENT)
Dept: OUTPATIENT SERVICES | Facility: HOSPITAL | Age: 53
LOS: 1 days | End: 2018-05-02
Payer: COMMERCIAL

## 2018-05-02 ENCOUNTER — APPOINTMENT (OUTPATIENT)
Dept: CT IMAGING | Facility: CLINIC | Age: 53
End: 2018-05-02
Payer: COMMERCIAL

## 2018-05-02 ENCOUNTER — APPOINTMENT (OUTPATIENT)
Dept: CT IMAGING | Facility: CLINIC | Age: 53
End: 2018-05-02

## 2018-05-02 DIAGNOSIS — Z98.890 OTHER SPECIFIED POSTPROCEDURAL STATES: Chronic | ICD-10-CM

## 2018-05-02 DIAGNOSIS — N82.3 FISTULA OF VAGINA TO LARGE INTESTINE: Chronic | ICD-10-CM

## 2018-05-02 DIAGNOSIS — Z00.8 ENCOUNTER FOR OTHER GENERAL EXAMINATION: ICD-10-CM

## 2018-05-02 DIAGNOSIS — S96.812A STRAIN OF OTHER SPECIFIED MUSCLES AND TENDONS AT ANKLE AND FOOT LEVEL, LEFT FOOT, INITIAL ENCOUNTER: Chronic | ICD-10-CM

## 2018-05-02 DIAGNOSIS — N81.10 CYSTOCELE, UNSPECIFIED: Chronic | ICD-10-CM

## 2018-05-02 DIAGNOSIS — Q78.0 OSTEOGENESIS IMPERFECTA: Chronic | ICD-10-CM

## 2018-05-02 PROCEDURE — 74178 CT ABD&PLV WO CNTR FLWD CNTR: CPT

## 2018-05-02 PROCEDURE — 82565 ASSAY OF CREATININE: CPT

## 2018-05-02 PROCEDURE — 74178 CT ABD&PLV WO CNTR FLWD CNTR: CPT | Mod: 26

## 2018-05-07 ENCOUNTER — APPOINTMENT (OUTPATIENT)
Dept: BARIATRICS/WEIGHT MGMT | Facility: CLINIC | Age: 53
End: 2018-05-07
Payer: COMMERCIAL

## 2018-05-07 VITALS
OXYGEN SATURATION: 99 % | BODY MASS INDEX: 33.23 KG/M2 | HEART RATE: 60 BPM | SYSTOLIC BLOOD PRESSURE: 122 MMHG | WEIGHT: 176 LBS | DIASTOLIC BLOOD PRESSURE: 72 MMHG | HEIGHT: 61 IN

## 2018-05-07 PROCEDURE — 99214 OFFICE O/P EST MOD 30 MIN: CPT

## 2018-05-14 ENCOUNTER — APPOINTMENT (OUTPATIENT)
Dept: UROLOGY | Facility: CLINIC | Age: 53
End: 2018-05-14
Payer: COMMERCIAL

## 2018-05-14 PROCEDURE — 99213 OFFICE O/P EST LOW 20 MIN: CPT

## 2018-05-15 LAB — CORE LAB FLUID CYTOLOGY: NORMAL

## 2018-05-16 LAB — BACTERIA UR CULT: NORMAL

## 2018-05-17 ENCOUNTER — TRANSCRIPTION ENCOUNTER (OUTPATIENT)
Age: 53
End: 2018-05-17

## 2018-05-17 ENCOUNTER — OUTPATIENT (OUTPATIENT)
Dept: OUTPATIENT SERVICES | Facility: HOSPITAL | Age: 53
LOS: 1 days | End: 2018-05-17
Payer: COMMERCIAL

## 2018-05-17 ENCOUNTER — APPOINTMENT (OUTPATIENT)
Dept: UROLOGY | Facility: CLINIC | Age: 53
End: 2018-05-17
Payer: COMMERCIAL

## 2018-05-17 VITALS
WEIGHT: 176 LBS | DIASTOLIC BLOOD PRESSURE: 82 MMHG | BODY MASS INDEX: 33.23 KG/M2 | SYSTOLIC BLOOD PRESSURE: 142 MMHG | RESPIRATION RATE: 16 BRPM | HEART RATE: 69 BPM | HEIGHT: 61 IN

## 2018-05-17 DIAGNOSIS — Z98.890 OTHER SPECIFIED POSTPROCEDURAL STATES: Chronic | ICD-10-CM

## 2018-05-17 DIAGNOSIS — Q78.0 OSTEOGENESIS IMPERFECTA: Chronic | ICD-10-CM

## 2018-05-17 DIAGNOSIS — R35.0 FREQUENCY OF MICTURITION: ICD-10-CM

## 2018-05-17 DIAGNOSIS — S96.812A STRAIN OF OTHER SPECIFIED MUSCLES AND TENDONS AT ANKLE AND FOOT LEVEL, LEFT FOOT, INITIAL ENCOUNTER: Chronic | ICD-10-CM

## 2018-05-17 DIAGNOSIS — N81.10 CYSTOCELE, UNSPECIFIED: Chronic | ICD-10-CM

## 2018-05-17 DIAGNOSIS — N82.3 FISTULA OF VAGINA TO LARGE INTESTINE: Chronic | ICD-10-CM

## 2018-05-17 PROCEDURE — 52000 CYSTOURETHROSCOPY: CPT

## 2018-05-18 DIAGNOSIS — R31.0 GROSS HEMATURIA: ICD-10-CM

## 2018-05-22 ENCOUNTER — TRANSCRIPTION ENCOUNTER (OUTPATIENT)
Age: 53
End: 2018-05-22

## 2018-06-11 ENCOUNTER — APPOINTMENT (OUTPATIENT)
Dept: BARIATRICS/WEIGHT MGMT | Facility: CLINIC | Age: 53
End: 2018-06-11
Payer: COMMERCIAL

## 2018-06-11 PROCEDURE — 99214 OFFICE O/P EST MOD 30 MIN: CPT

## 2018-06-11 RX ORDER — TOPIRAMATE 50 MG/1
50 TABLET, FILM COATED ORAL
Qty: 1 | Refills: 3 | Status: DISCONTINUED | COMMUNITY
Start: 2018-02-26 | End: 2018-06-11

## 2018-06-25 ENCOUNTER — APPOINTMENT (OUTPATIENT)
Dept: BARIATRICS/WEIGHT MGMT | Facility: CLINIC | Age: 53
End: 2018-06-25

## 2018-07-16 PROBLEM — K80.20 CALCULUS OF GALLBLADDER WITHOUT CHOLECYSTITIS WITHOUT OBSTRUCTION: Chronic | Status: INACTIVE | Noted: 2017-02-01 | Resolved: 2017-04-14

## 2018-07-30 ENCOUNTER — APPOINTMENT (OUTPATIENT)
Dept: BARIATRICS/WEIGHT MGMT | Facility: CLINIC | Age: 53
End: 2018-07-30

## 2018-08-06 ENCOUNTER — OUTPATIENT (OUTPATIENT)
Dept: OUTPATIENT SERVICES | Facility: HOSPITAL | Age: 53
LOS: 1 days | Discharge: HOME | End: 2018-08-06

## 2018-08-06 DIAGNOSIS — Z41.9 ENCOUNTER FOR PROCEDURE FOR PURPOSES OTHER THAN REMEDYING HEALTH STATE, UNSPECIFIED: Chronic | ICD-10-CM

## 2018-08-06 DIAGNOSIS — S96.812A STRAIN OF OTHER SPECIFIED MUSCLES AND TENDONS AT ANKLE AND FOOT LEVEL, LEFT FOOT, INITIAL ENCOUNTER: Chronic | ICD-10-CM

## 2018-08-06 DIAGNOSIS — N82.3 FISTULA OF VAGINA TO LARGE INTESTINE: Chronic | ICD-10-CM

## 2018-08-06 DIAGNOSIS — Z98.890 OTHER SPECIFIED POSTPROCEDURAL STATES: Chronic | ICD-10-CM

## 2018-08-06 DIAGNOSIS — N81.10 CYSTOCELE, UNSPECIFIED: Chronic | ICD-10-CM

## 2018-08-06 DIAGNOSIS — Q78.0 OSTEOGENESIS IMPERFECTA: Chronic | ICD-10-CM

## 2018-08-06 DIAGNOSIS — Z96.7 PRESENCE OF OTHER BONE AND TENDON IMPLANTS: Chronic | ICD-10-CM

## 2018-08-06 DIAGNOSIS — K43.9 VENTRAL HERNIA WITHOUT OBSTRUCTION OR GANGRENE: ICD-10-CM

## 2018-08-06 LAB
ANION GAP SERPL CALC-SCNC: 5 MMOL/L — SIGNIFICANT CHANGE UP (ref 5–17)
BASOPHILS # BLD AUTO: 0.04 K/UL — SIGNIFICANT CHANGE UP (ref 0–0.2)
BASOPHILS NFR BLD AUTO: 0.8 % — SIGNIFICANT CHANGE UP (ref 0–2)
BUN SERPL-MCNC: 8 MG/DL — SIGNIFICANT CHANGE UP (ref 7–23)
CALCIUM SERPL-MCNC: 9.1 MG/DL — SIGNIFICANT CHANGE UP (ref 8.5–10.1)
CHLORIDE SERPL-SCNC: 106 MMOL/L — SIGNIFICANT CHANGE UP (ref 96–108)
CO2 SERPL-SCNC: 29 MMOL/L — SIGNIFICANT CHANGE UP (ref 22–31)
CREAT SERPL-MCNC: 1.01 MG/DL — SIGNIFICANT CHANGE UP (ref 0.5–1.3)
EOSINOPHIL # BLD AUTO: 0.05 K/UL — SIGNIFICANT CHANGE UP (ref 0–0.5)
EOSINOPHIL NFR BLD AUTO: 1 % — SIGNIFICANT CHANGE UP (ref 0–6)
GLUCOSE SERPL-MCNC: 101 MG/DL — HIGH (ref 70–99)
HCT VFR BLD CALC: 41.1 % — SIGNIFICANT CHANGE UP (ref 34.5–45)
HGB BLD-MCNC: 14.2 G/DL — SIGNIFICANT CHANGE UP (ref 11.5–15.5)
IMM GRANULOCYTES NFR BLD AUTO: 0.4 % — SIGNIFICANT CHANGE UP (ref 0–1.5)
LYMPHOCYTES # BLD AUTO: 1.67 K/UL — SIGNIFICANT CHANGE UP (ref 1–3.3)
LYMPHOCYTES # BLD AUTO: 33.4 % — SIGNIFICANT CHANGE UP (ref 13–44)
MCHC RBC-ENTMCNC: 30.5 PG — SIGNIFICANT CHANGE UP (ref 27–34)
MCHC RBC-ENTMCNC: 34.5 GM/DL — SIGNIFICANT CHANGE UP (ref 32–36)
MCV RBC AUTO: 88.2 FL — SIGNIFICANT CHANGE UP (ref 80–100)
MONOCYTES # BLD AUTO: 0.29 K/UL — SIGNIFICANT CHANGE UP (ref 0–0.9)
MONOCYTES NFR BLD AUTO: 5.8 % — SIGNIFICANT CHANGE UP (ref 2–14)
NEUTROPHILS # BLD AUTO: 2.93 K/UL — SIGNIFICANT CHANGE UP (ref 1.8–7.4)
NEUTROPHILS NFR BLD AUTO: 58.6 % — SIGNIFICANT CHANGE UP (ref 43–77)
PLATELET # BLD AUTO: 227 K/UL — SIGNIFICANT CHANGE UP (ref 150–400)
POTASSIUM SERPL-MCNC: 4.2 MMOL/L — SIGNIFICANT CHANGE UP (ref 3.5–5.3)
POTASSIUM SERPL-SCNC: 4.2 MMOL/L — SIGNIFICANT CHANGE UP (ref 3.5–5.3)
RBC # BLD: 4.66 M/UL — SIGNIFICANT CHANGE UP (ref 3.8–5.2)
RBC # FLD: 12.2 % — SIGNIFICANT CHANGE UP (ref 10.3–14.5)
SODIUM SERPL-SCNC: 140 MMOL/L — SIGNIFICANT CHANGE UP (ref 135–145)
WBC # BLD: 5 K/UL — SIGNIFICANT CHANGE UP (ref 3.8–10.5)
WBC # FLD AUTO: 5 K/UL — SIGNIFICANT CHANGE UP (ref 3.8–10.5)

## 2018-08-06 RX ORDER — ESTROGENS, CONJUGATED 0.625 MG/G
0 CREAM WITH APPLICATOR VAGINAL
Qty: 0 | Refills: 0 | COMMUNITY

## 2018-08-06 RX ORDER — NEBIVOLOL HYDROCHLORIDE 5 MG/1
1 TABLET ORAL
Qty: 0 | Refills: 0 | COMMUNITY

## 2018-08-06 NOTE — ASU PATIENT PROFILE, ADULT - PMH
Acid reflux    Ahn esophagus    Chronic UTI    Constipation    Endometriosis    Hemorrhoids, unspecified hemorrhoid type    Hepatic adenoma  History of. 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

## 2018-08-06 NOTE — CHART NOTE - NSCHARTNOTEFT_GEN_A_CORE
BP left arm 115/67  HR 62 bpm  ghulam 98 F  RR 14/min  O2 sat 98% on RA      1. Labs as per surgeon  2. EKG on chart  3. Medical clearance with PCP Dr Rodriguez  4. discussed EZ sponges, mupirocin & day of procedure instructions

## 2018-08-06 NOTE — ASU PATIENT PROFILE, ADULT - PSH
Cystocele with rectocele  5/11/2017  Elective surgery  removal of hepatic adenoma 2006  Hepatic adenoma  resected 2006  History of rectal surgery  2013 interstimulator for rectal incontinence  later removed 2016  Rectovaginal fistula  surgery - 07/2017  S/P appendectomy  1979  S/P breast augmentation  reduction 2003  S/P colonoscopy  multiple  S/P exploratory laparotomy  cholecystectomy, ROZINA, endometriosis  S/P hysterectomy with oophorectomy  with mesh for stress incontinence  S/P LASIK surgery of both eyes  2000  S/P Nissen fundoplication (with gastrostomy tube placement)  for barrets esophagus 2006  S/P ORIF (open reduction internal fixation) fracture  left foot 2016  S/P tonsillectomy  1979  Stress incontinence, female  right side of mesh "cut" adjusted  Tendon rupture, post-op  repaired right elbow 2011  Traumatic rupture of plantar fascia of left foot, initial encounter  surgery

## 2018-08-07 PROBLEM — N39.0 URINARY TRACT INFECTION, SITE NOT SPECIFIED: Chronic | Status: ACTIVE | Noted: 2017-04-14

## 2018-08-07 PROBLEM — R15.9 FULL INCONTINENCE OF FECES: Chronic | Status: ACTIVE | Noted: 2017-02-01

## 2018-08-07 PROBLEM — E04.1 NONTOXIC SINGLE THYROID NODULE: Chronic | Status: ACTIVE | Noted: 2017-04-14

## 2018-08-07 PROBLEM — M62.89 OTHER SPECIFIED DISORDERS OF MUSCLE: Chronic | Status: ACTIVE | Noted: 2017-04-14

## 2018-08-07 PROBLEM — N82.3 FISTULA OF VAGINA TO LARGE INTESTINE: Chronic | Status: INACTIVE | Noted: 2017-04-14 | Resolved: 2018-08-06

## 2018-08-07 PROBLEM — K59.00 CONSTIPATION, UNSPECIFIED: Chronic | Status: ACTIVE | Noted: 2017-04-14

## 2018-08-07 PROBLEM — Z79.890 HORMONE REPLACEMENT THERAPY: Chronic | Status: ACTIVE | Noted: 2017-04-14

## 2018-08-07 PROBLEM — D13.4 BENIGN NEOPLASM OF LIVER: Chronic | Status: ACTIVE | Noted: 2017-02-01

## 2018-08-07 PROBLEM — N81.6 RECTOCELE: Chronic | Status: ACTIVE | Noted: 2017-02-01

## 2018-08-07 PROBLEM — S83.209A UNSPECIFIED TEAR OF UNSPECIFIED MENISCUS, CURRENT INJURY, UNSPECIFIED KNEE, INITIAL ENCOUNTER: Chronic | Status: ACTIVE | Noted: 2017-11-07

## 2018-08-07 PROBLEM — K64.9 UNSPECIFIED HEMORRHOIDS: Chronic | Status: ACTIVE | Noted: 2017-02-01

## 2018-08-07 PROBLEM — S34.22XD: Chronic | Status: ACTIVE | Noted: 2017-02-01

## 2018-08-07 PROBLEM — E66.9 OBESITY, UNSPECIFIED: Chronic | Status: ACTIVE | Noted: 2017-04-14

## 2018-08-07 PROBLEM — K58.2 MIXED IRRITABLE BOWEL SYNDROME: Chronic | Status: ACTIVE | Noted: 2017-02-01

## 2018-08-07 PROBLEM — G58.8 OTHER SPECIFIED MONONEUROPATHIES: Chronic | Status: ACTIVE | Noted: 2017-04-14

## 2018-08-07 PROBLEM — E55.9 VITAMIN D DEFICIENCY, UNSPECIFIED: Chronic | Status: ACTIVE | Noted: 2017-11-07

## 2018-08-09 ENCOUNTER — APPOINTMENT (OUTPATIENT)
Dept: MRI IMAGING | Facility: CLINIC | Age: 53
End: 2018-08-09
Payer: COMMERCIAL

## 2018-08-09 ENCOUNTER — OUTPATIENT (OUTPATIENT)
Dept: OUTPATIENT SERVICES | Facility: HOSPITAL | Age: 53
LOS: 1 days | End: 2018-08-09
Payer: COMMERCIAL

## 2018-08-09 DIAGNOSIS — Z41.9 ENCOUNTER FOR PROCEDURE FOR PURPOSES OTHER THAN REMEDYING HEALTH STATE, UNSPECIFIED: Chronic | ICD-10-CM

## 2018-08-09 DIAGNOSIS — N82.3 FISTULA OF VAGINA TO LARGE INTESTINE: Chronic | ICD-10-CM

## 2018-08-09 DIAGNOSIS — Z98.890 OTHER SPECIFIED POSTPROCEDURAL STATES: Chronic | ICD-10-CM

## 2018-08-09 DIAGNOSIS — Z96.7 PRESENCE OF OTHER BONE AND TENDON IMPLANTS: Chronic | ICD-10-CM

## 2018-08-09 DIAGNOSIS — N81.10 CYSTOCELE, UNSPECIFIED: Chronic | ICD-10-CM

## 2018-08-09 DIAGNOSIS — Z00.8 ENCOUNTER FOR OTHER GENERAL EXAMINATION: ICD-10-CM

## 2018-08-09 DIAGNOSIS — S96.812A STRAIN OF OTHER SPECIFIED MUSCLES AND TENDONS AT ANKLE AND FOOT LEVEL, LEFT FOOT, INITIAL ENCOUNTER: Chronic | ICD-10-CM

## 2018-08-09 PROCEDURE — 72148 MRI LUMBAR SPINE W/O DYE: CPT

## 2018-08-09 PROCEDURE — 72148 MRI LUMBAR SPINE W/O DYE: CPT | Mod: 26

## 2018-08-09 PROCEDURE — 72146 MRI CHEST SPINE W/O DYE: CPT

## 2018-08-10 PROBLEM — N80.9 ENDOMETRIOSIS, UNSPECIFIED: Chronic | Status: ACTIVE | Noted: 2018-08-06

## 2018-08-10 PROCEDURE — 72146 MRI CHEST SPINE W/O DYE: CPT | Mod: 26

## 2018-08-10 RX ORDER — SODIUM CHLORIDE 9 MG/ML
3 INJECTION INTRAMUSCULAR; INTRAVENOUS; SUBCUTANEOUS EVERY 8 HOURS
Qty: 0 | Refills: 0 | Status: DISCONTINUED | OUTPATIENT
Start: 2018-08-13 | End: 2018-08-14

## 2018-08-13 ENCOUNTER — OUTPATIENT (OUTPATIENT)
Dept: OUTPATIENT SERVICES | Facility: HOSPITAL | Age: 53
LOS: 1 days | Discharge: HOME | End: 2018-08-13

## 2018-08-13 VITALS
HEART RATE: 89 BPM | TEMPERATURE: 98 F | SYSTOLIC BLOOD PRESSURE: 135 MMHG | RESPIRATION RATE: 17 BRPM | OXYGEN SATURATION: 97 % | DIASTOLIC BLOOD PRESSURE: 58 MMHG | HEIGHT: 70 IN | WEIGHT: 205.03 LBS

## 2018-08-13 DIAGNOSIS — Z98.890 OTHER SPECIFIED POSTPROCEDURAL STATES: Chronic | ICD-10-CM

## 2018-08-13 DIAGNOSIS — N81.10 CYSTOCELE, UNSPECIFIED: Chronic | ICD-10-CM

## 2018-08-13 DIAGNOSIS — Z41.9 ENCOUNTER FOR PROCEDURE FOR PURPOSES OTHER THAN REMEDYING HEALTH STATE, UNSPECIFIED: Chronic | ICD-10-CM

## 2018-08-13 DIAGNOSIS — S96.812A STRAIN OF OTHER SPECIFIED MUSCLES AND TENDONS AT ANKLE AND FOOT LEVEL, LEFT FOOT, INITIAL ENCOUNTER: Chronic | ICD-10-CM

## 2018-08-13 DIAGNOSIS — N82.3 FISTULA OF VAGINA TO LARGE INTESTINE: Chronic | ICD-10-CM

## 2018-08-13 DIAGNOSIS — Z96.7 PRESENCE OF OTHER BONE AND TENDON IMPLANTS: Chronic | ICD-10-CM

## 2018-08-13 RX ORDER — FENTANYL CITRATE 50 UG/ML
50 INJECTION INTRAVENOUS
Qty: 0 | Refills: 0 | Status: DISCONTINUED | OUTPATIENT
Start: 2018-08-13 | End: 2018-08-13

## 2018-08-13 RX ORDER — ONDANSETRON 8 MG/1
4 TABLET, FILM COATED ORAL ONCE
Qty: 0 | Refills: 0 | Status: DISCONTINUED | OUTPATIENT
Start: 2018-08-13 | End: 2018-08-13

## 2018-08-13 RX ORDER — OXYCODONE HYDROCHLORIDE 5 MG/1
5 TABLET ORAL ONCE
Qty: 0 | Refills: 0 | Status: DISCONTINUED | OUTPATIENT
Start: 2018-08-13 | End: 2018-08-13

## 2018-08-13 RX ORDER — MUPIROCIN 20 MG/G
1 OINTMENT TOPICAL
Qty: 0 | Refills: 0 | COMMUNITY

## 2018-08-13 RX ORDER — FLUOXETINE HCL 10 MG
20 CAPSULE ORAL DAILY
Qty: 0 | Refills: 0 | Status: DISCONTINUED | OUTPATIENT
Start: 2018-08-13 | End: 2018-08-14

## 2018-08-13 RX ORDER — ACETAMINOPHEN 500 MG
1000 TABLET ORAL ONCE
Qty: 0 | Refills: 0 | Status: COMPLETED | OUTPATIENT
Start: 2018-08-13 | End: 2018-08-13

## 2018-08-13 RX ORDER — FAMOTIDINE 10 MG/ML
20 INJECTION INTRAVENOUS ONCE
Qty: 0 | Refills: 0 | Status: COMPLETED | OUTPATIENT
Start: 2018-08-13 | End: 2018-08-13

## 2018-08-13 RX ORDER — NALOXEGOL OXALATE 12.5 MG/1
25 TABLET, FILM COATED ORAL DAILY
Qty: 0 | Refills: 0 | Status: DISCONTINUED | OUTPATIENT
Start: 2018-08-13 | End: 2018-08-14

## 2018-08-13 RX ORDER — SODIUM CHLORIDE 9 MG/ML
1000 INJECTION INTRAMUSCULAR; INTRAVENOUS; SUBCUTANEOUS
Qty: 0 | Refills: 0 | Status: DISCONTINUED | OUTPATIENT
Start: 2018-08-13 | End: 2018-08-13

## 2018-08-13 RX ORDER — HYDROMORPHONE HYDROCHLORIDE 2 MG/ML
0.5 INJECTION INTRAMUSCULAR; INTRAVENOUS; SUBCUTANEOUS EVERY 4 HOURS
Qty: 0 | Refills: 0 | Status: DISCONTINUED | OUTPATIENT
Start: 2018-08-13 | End: 2018-08-14

## 2018-08-13 RX ORDER — ACETAMINOPHEN WITH CODEINE 300MG-30MG
1 TABLET ORAL EVERY 4 HOURS
Qty: 0 | Refills: 0 | Status: DISCONTINUED | OUTPATIENT
Start: 2018-08-13 | End: 2018-08-14

## 2018-08-13 RX ADMIN — HYDROMORPHONE HYDROCHLORIDE 0.5 MILLIGRAM(S): 2 INJECTION INTRAMUSCULAR; INTRAVENOUS; SUBCUTANEOUS at 20:56

## 2018-08-13 RX ADMIN — SODIUM CHLORIDE 3 MILLILITER(S): 9 INJECTION INTRAMUSCULAR; INTRAVENOUS; SUBCUTANEOUS at 21:02

## 2018-08-13 RX ADMIN — SODIUM CHLORIDE 75 MILLILITER(S): 9 INJECTION INTRAMUSCULAR; INTRAVENOUS; SUBCUTANEOUS at 15:35

## 2018-08-13 RX ADMIN — Medication 400 MILLIGRAM(S): at 17:13

## 2018-08-13 RX ADMIN — FAMOTIDINE 20 MILLIGRAM(S): 10 INJECTION INTRAVENOUS at 12:54

## 2018-08-13 RX ADMIN — SODIUM CHLORIDE 3 MILLILITER(S): 9 INJECTION INTRAMUSCULAR; INTRAVENOUS; SUBCUTANEOUS at 18:14

## 2018-08-13 RX ADMIN — OXYCODONE HYDROCHLORIDE 5 MILLIGRAM(S): 5 TABLET ORAL at 17:14

## 2018-08-13 RX ADMIN — FENTANYL CITRATE 50 MICROGRAM(S): 50 INJECTION INTRAVENOUS at 14:56

## 2018-08-13 RX ADMIN — HYDROMORPHONE HYDROCHLORIDE 0.5 MILLIGRAM(S): 2 INJECTION INTRAMUSCULAR; INTRAVENOUS; SUBCUTANEOUS at 21:32

## 2018-08-13 RX ADMIN — FENTANYL CITRATE 50 MICROGRAM(S): 50 INJECTION INTRAVENOUS at 14:49

## 2018-08-13 NOTE — ASU DISCHARGE PLAN (ADULT/PEDIATRIC). - MEDICATION SUMMARY - MEDICATIONS TO TAKE
I will START or STAY ON the medications listed below when I get home from the hospital:    oxyCODONE 10 mg oral tablet  -- 1 tab(s) by mouth every 6 hours, As Needed  -- Indication: For VENTRAL HERNIA    Valium 5 mg oral tablet  -- 1 tab(s) by mouth 2 times a day, As Needed  -- Indication: For VENTRAL HERNIA    Sarafem 20 mg oral capsule  -- 1 cap(s) by mouth once a day  -- Indication: For VENTRAL HERNIA    Bystolic 10 mg oral tablet  -- 1 tab(s) by mouth once a day  -- Indication: For VENTRAL HERNIA    Movantik 25 mg oral tablet  -- 1 tab(s) by mouth once a day (in the morning)  -- Indication: For VENTRAL HERNIA    Vitamin D2 50,000 intl units (1.25 mg) oral capsule  -- 1 cap(s) by mouth once a week  -- Indication: For VENTRAL HERNIA

## 2018-08-13 NOTE — PROGRESS NOTE ADULT - SUBJECTIVE AND OBJECTIVE BOX
pt seen in post op     doing well    minimal pain   lungs clear  abdomen benign  heart  rrr    stable exam  plan to d/c in am

## 2018-08-13 NOTE — BRIEF OPERATIVE NOTE - PROCEDURE
<<-----Click on this checkbox to enter Procedure Ventral hernia repair with mesh  08/13/2018    Active  [id number]

## 2018-08-14 VITALS
RESPIRATION RATE: 16 BRPM | SYSTOLIC BLOOD PRESSURE: 100 MMHG | HEART RATE: 70 BPM | OXYGEN SATURATION: 100 % | TEMPERATURE: 98 F | DIASTOLIC BLOOD PRESSURE: 50 MMHG

## 2018-08-14 RX ADMIN — HYDROMORPHONE HYDROCHLORIDE 0.5 MILLIGRAM(S): 2 INJECTION INTRAMUSCULAR; INTRAVENOUS; SUBCUTANEOUS at 01:56

## 2018-08-14 RX ADMIN — HYDROMORPHONE HYDROCHLORIDE 0.5 MILLIGRAM(S): 2 INJECTION INTRAMUSCULAR; INTRAVENOUS; SUBCUTANEOUS at 01:18

## 2018-08-14 RX ADMIN — SODIUM CHLORIDE 3 MILLILITER(S): 9 INJECTION INTRAMUSCULAR; INTRAVENOUS; SUBCUTANEOUS at 05:25

## 2018-08-14 RX ADMIN — HYDROMORPHONE HYDROCHLORIDE 0.5 MILLIGRAM(S): 2 INJECTION INTRAMUSCULAR; INTRAVENOUS; SUBCUTANEOUS at 08:01

## 2018-08-14 RX ADMIN — HYDROMORPHONE HYDROCHLORIDE 0.5 MILLIGRAM(S): 2 INJECTION INTRAMUSCULAR; INTRAVENOUS; SUBCUTANEOUS at 06:47

## 2018-08-18 DIAGNOSIS — Z90.710 ACQUIRED ABSENCE OF BOTH CERVIX AND UTERUS: ICD-10-CM

## 2018-08-18 DIAGNOSIS — Z87.891 PERSONAL HISTORY OF NICOTINE DEPENDENCE: ICD-10-CM

## 2018-08-18 DIAGNOSIS — E78.5 HYPERLIPIDEMIA, UNSPECIFIED: ICD-10-CM

## 2018-08-18 DIAGNOSIS — Z88.0 ALLERGY STATUS TO PENICILLIN: ICD-10-CM

## 2018-08-18 DIAGNOSIS — K43.9 VENTRAL HERNIA WITHOUT OBSTRUCTION OR GANGRENE: ICD-10-CM

## 2018-08-18 DIAGNOSIS — Z88.2 ALLERGY STATUS TO SULFONAMIDES: ICD-10-CM

## 2018-08-18 DIAGNOSIS — I10 ESSENTIAL (PRIMARY) HYPERTENSION: ICD-10-CM

## 2018-08-18 DIAGNOSIS — Z95.0 PRESENCE OF CARDIAC PACEMAKER: ICD-10-CM

## 2018-10-22 ENCOUNTER — APPOINTMENT (OUTPATIENT)
Dept: ULTRASOUND IMAGING | Facility: CLINIC | Age: 53
End: 2018-10-22
Payer: COMMERCIAL

## 2018-10-22 ENCOUNTER — OUTPATIENT (OUTPATIENT)
Dept: OUTPATIENT SERVICES | Facility: HOSPITAL | Age: 53
LOS: 1 days | End: 2018-10-22
Payer: COMMERCIAL

## 2018-10-22 ENCOUNTER — APPOINTMENT (OUTPATIENT)
Dept: MAMMOGRAPHY | Facility: CLINIC | Age: 53
End: 2018-10-22
Payer: COMMERCIAL

## 2018-10-22 DIAGNOSIS — Z98.890 OTHER SPECIFIED POSTPROCEDURAL STATES: Chronic | ICD-10-CM

## 2018-10-22 DIAGNOSIS — S96.812A STRAIN OF OTHER SPECIFIED MUSCLES AND TENDONS AT ANKLE AND FOOT LEVEL, LEFT FOOT, INITIAL ENCOUNTER: Chronic | ICD-10-CM

## 2018-10-22 DIAGNOSIS — N82.3 FISTULA OF VAGINA TO LARGE INTESTINE: Chronic | ICD-10-CM

## 2018-10-22 DIAGNOSIS — Z00.8 ENCOUNTER FOR OTHER GENERAL EXAMINATION: ICD-10-CM

## 2018-10-22 DIAGNOSIS — Z41.9 ENCOUNTER FOR PROCEDURE FOR PURPOSES OTHER THAN REMEDYING HEALTH STATE, UNSPECIFIED: Chronic | ICD-10-CM

## 2018-10-22 DIAGNOSIS — N81.10 CYSTOCELE, UNSPECIFIED: Chronic | ICD-10-CM

## 2018-10-22 DIAGNOSIS — Z96.7 PRESENCE OF OTHER BONE AND TENDON IMPLANTS: Chronic | ICD-10-CM

## 2018-10-22 PROCEDURE — G0279: CPT | Mod: 26

## 2018-10-22 PROCEDURE — 77066 DX MAMMO INCL CAD BI: CPT | Mod: 26

## 2018-10-22 PROCEDURE — 77066 DX MAMMO INCL CAD BI: CPT

## 2018-10-22 PROCEDURE — 76641 ULTRASOUND BREAST COMPLETE: CPT | Mod: 26,50

## 2018-10-22 PROCEDURE — G0279: CPT

## 2018-10-22 PROCEDURE — 76641 ULTRASOUND BREAST COMPLETE: CPT

## 2019-03-05 ENCOUNTER — TRANSCRIPTION ENCOUNTER (OUTPATIENT)
Age: 54
End: 2019-03-05

## 2019-04-02 ENCOUNTER — TRANSCRIPTION ENCOUNTER (OUTPATIENT)
Age: 54
End: 2019-04-02

## 2019-08-02 NOTE — CONSULT NOTE ADULT - ASSESSMENT
Patient is 52yo female with PMHx of Barretts esophagus, Endometriosis, HLD, HTN. Rectovaginal fistula, s/p recent laparoscopic examination of the abd which demonstrated endometriosis on 4/24 admitted 5/6  with weakness and jaundice. Pt reports on Wed prior to admission- she started feeling weak, fatigued, decreased energy and noted yellowing of her skin and eyes. Upon arrival to the ER HH noted to be 8, baseline 13, 2 weeks ago with elevated bili to 3.2, indirect 1.6, occasionally has had some brbpr d/t hemorrhoids but no active bleeding, she reports having feces output from her vagina for quite some time now after all her previous surgeries and 2 weeks ago surgery did not reveal any notable fistula, adhesions and changes c/w endometriosis were seen. She is being eval by GI and colo-rectal surgery with plans for flex/sigmoidoscopy to eval for fistula. 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 She denies any cough, no diarrhea, no abd pain, no nasal congestion or URI symptoms. No rashes, no recent travel. No sick contacts. Pt only sexually active with . Of note, her wbc ct was elevated to 17.2 on admit, UA was positive, cx contaminated and she has been on IV rocephin since 5/6.  I.D was consulted to r/o infectious etiologies for anemia.    1. 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 infection?    - low suspicion for infectious process    - can send parvovirus B19, CMV, EBV serology and HIV testing as these are known to contribute to anemia    - would also r/o autoimmune process     - G6PD/flow for PNH pending    - is currently on IV rocephin [id number] day #4 for UTI coverage, wbc ct normalized, urine cx contaminated, continue with antimicrobial coverage    - blood cx no growth    - f/u CBC    - plan for flex/sigmoidoscopy per colorectal/GI to eval for fistula    - monitor temps    - supportive care    2. other issues - Barretts esophagus, Endometriosis, HLD, HTN, rectovaginal fistula  - care per medicine 02-Aug-2019 22:28

## 2019-11-07 ENCOUNTER — APPOINTMENT (OUTPATIENT)
Dept: UROLOGY | Facility: CLINIC | Age: 54
End: 2019-11-07
Payer: COMMERCIAL

## 2019-11-07 DIAGNOSIS — N30.20 OTHER CHRONIC CYSTITIS W/OUT HEMATURIA: ICD-10-CM

## 2019-11-07 DIAGNOSIS — Z80.52 FAMILY HISTORY OF MALIGNANT NEOPLASM OF BLADDER: ICD-10-CM

## 2019-11-07 PROCEDURE — 99213 OFFICE O/P EST LOW 20 MIN: CPT | Mod: 25

## 2019-11-07 PROCEDURE — 51798 US URINE CAPACITY MEASURE: CPT

## 2019-11-07 RX ORDER — NITROFURANTOIN MACROCRYSTALS 50 MG/1
50 CAPSULE ORAL
Qty: 90 | Refills: 0 | Status: COMPLETED | COMMUNITY
Start: 2017-02-17 | End: 2019-11-07

## 2019-11-07 NOTE — HISTORY OF PRESENT ILLNESS
[FreeTextEntry1] : 52 yo woman with PSH multiple sling revisions,Dr. Redd (radical pelvic sx according to patient) which did not find any residual mesh - she did have concomitant rectocele repair - no reports with her today. She has had chronic pain managed by Dr. Javed and other pain specialists. She has had Interstim for bowel dysfunction which failed. She saw  Dr. Larson for repair of RVF, unsuccessful. Second surgery with Dr. Mazariegos successful.Today she is here for noting blood in her urine for the past 1 week,no fevers last week, more concerned with her brother being diagnosed with terminal bladder stage 4 .Her last cytso and cytology was neg.CT Urogram 2018 was neg.\par Voids with some infrequent voids X 3 per day and no nocturia,denies dysuria uses 1 pad per day for security and notes mild dampness on pad. Has FI  and seeing Dr Rm for same. Also still notes passing air per vagina on walking and with BM. PVR 2 ml.Urine for UA ,CX and cytology .Call for results. \par \par  \par

## 2019-11-07 NOTE — REVIEW OF SYSTEMS
[Diarrhea] : diarrhea [see HPI] : see HPI [Incontinence] : incontinence [Dysuria] : no dysuria [Negative] : Heme/Lymph

## 2019-11-07 NOTE — ASSESSMENT
[FreeTextEntry1] : \par \par \par \par \par Impression/Plan:52 yo woman with PSH multiple sling revisions,Dr. Redd (radical pelvic sx according to patient) which did not find any residual mesh - she did have concomitant rectocele repair - no reports with her today. She has had chronic pain managed by Dr. Javed and other pain specialists. She has had Interstim for bowel dysfunction which failed. She saw  Dr. Larson for repair of RVF, unsuccessful. Second surgery with Dr. Mazariegos successful.Today she is here for noting blood in her urine for the past 1 week,no fevers last week, more concerned with her brother being diagnosed with terminal bladder stage 4 .Her last cytso and cytology was neg.CT Urogram 2018 was neg.Voids with some infrequent voids X 3 per day- weak , trickling,intermittent , and no nocturia,denies dysuria uses 1 pad per day for security and notes mild dampness on pad. Has FI  and seeing Dr Rm for same. Also still notes passing air per vagina on walking and with BM. PVR 2 ml.\par 1.Urine for UA ,CX and cytology .Call for results.

## 2019-11-08 LAB
APPEARANCE: CLEAR
BACTERIA UR CULT: NORMAL
BACTERIA: ABNORMAL
BILIRUBIN URINE: NEGATIVE
BLOOD URINE: NORMAL
COLOR: NORMAL
GLUCOSE QUALITATIVE U: NEGATIVE
HYALINE CASTS: 0 /LPF
KETONES URINE: NEGATIVE
LEUKOCYTE ESTERASE URINE: NEGATIVE
MICROSCOPIC-UA: NORMAL
NITRITE URINE: NEGATIVE
PH URINE: 7.5
PROTEIN URINE: NEGATIVE
RED BLOOD CELLS URINE: 2 /HPF
SPECIFIC GRAVITY URINE: 1.01
SQUAMOUS EPITHELIAL CELLS: 9 /HPF
UROBILINOGEN URINE: NORMAL
WHITE BLOOD CELLS URINE: 1 /HPF

## 2019-11-11 LAB — URINE CYTOLOGY: NORMAL

## 2019-11-15 NOTE — ASU PATIENT PROFILE, ADULT - CIGARETTES, PACKS/DAY
Pt remains in policy paper scrubs, resting in stretcher comfortably. Pt remains calm and cooperative with staff, aaox4, speaking approprietly in clear full sentences at this time. Respirations remain spontaneous, even and non labored. Toileting needs addressed at this time, pt states will call nurse for assistance. Patient denies pains or needs at this time. No signs of distress noted at this time. NELSON Yael remains at bedside in direct visual contact, charting per protocol every 15 minutes. No equipment or belongings are in the patients room. Pt aware of plan of current care and that report has been called. Will continue to monitor closely.   1

## 2019-11-26 ENCOUNTER — CHART COPY (OUTPATIENT)
Age: 54
End: 2019-11-26

## 2019-11-26 ENCOUNTER — APPOINTMENT (OUTPATIENT)
Dept: COLORECTAL SURGERY | Facility: CLINIC | Age: 54
End: 2019-11-26

## 2019-12-03 LAB
APPEARANCE: ABNORMAL
BACTERIA: ABNORMAL
BILIRUBIN URINE: NEGATIVE
BLOOD URINE: NEGATIVE
COLOR: YELLOW
GLUCOSE QUALITATIVE U: NEGATIVE
HYALINE CASTS: 0 /LPF
KETONES URINE: NEGATIVE
LEUKOCYTE ESTERASE URINE: ABNORMAL
MICROSCOPIC-UA: NORMAL
NITRITE URINE: NEGATIVE
PH URINE: 5.5
PROTEIN URINE: NORMAL
RED BLOOD CELLS URINE: 5 /HPF
SPECIFIC GRAVITY URINE: 1.02
SQUAMOUS EPITHELIAL CELLS: 2 /HPF
UROBILINOGEN URINE: NORMAL
WHITE BLOOD CELLS URINE: 56 /HPF

## 2019-12-10 ENCOUNTER — APPOINTMENT (OUTPATIENT)
Dept: COLORECTAL SURGERY | Facility: CLINIC | Age: 54
End: 2019-12-10
Payer: COMMERCIAL

## 2019-12-10 VITALS — HEIGHT: 61 IN | RESPIRATION RATE: 16 BRPM

## 2019-12-10 DIAGNOSIS — N82.4 OTHER FEMALE INTESTINAL-GENITAL TRACT FISTULAE: ICD-10-CM

## 2019-12-10 DIAGNOSIS — M62.89 OTHER SPECIFIED DISORDERS OF MUSCLE: ICD-10-CM

## 2019-12-10 PROCEDURE — 99214 OFFICE O/P EST MOD 30 MIN: CPT | Mod: 25

## 2019-12-10 PROCEDURE — 46600 DIAGNOSTIC ANOSCOPY SPX: CPT

## 2019-12-13 PROBLEM — N82.4 COLOVAGINAL FISTULA: Status: ACTIVE | Noted: 2017-01-26

## 2019-12-13 PROBLEM — M62.89 PELVIC FLOOR DYSFUNCTION: Status: ACTIVE | Noted: 2017-01-26

## 2019-12-13 NOTE — PHYSICAL EXAM
[Abdomen Masses] : No abdominal masses [Tender] : nontender [Fistula] : no fistulas [Lax] : was lax [None] : there was no rectal abscess [JVD] : no jugular venous distention  [Normal Rate and Rhythm] : normal rate and rhythm [Normal Breath Sounds] : Normal breath sounds [Normal Heart Sounds] : normal heart sounds [Alert] : alert [No Rash or Lesion] : No rash or lesion [Oriented to Person] : oriented to person [Oriented to Place] : oriented to place [Oriented to Time] : oriented to time [de-identified] : Looks well in no distress, of stated age. [de-identified] : pupils equal reactive to light normocephalic atraumatic. [Calm] : calm [de-identified] : moves all 4 extremities appropriately with 5 over 5 strength

## 2019-12-13 NOTE — PROCEDURE
[FreeTextEntry1] : Anoscopy was performed, demonstrating internal/external hemorrhoids.No fistula seen

## 2019-12-13 NOTE — HISTORY OF PRESENT ILLNESS
[FreeTextEntry1] : 54-year-old female post from transrectal repair rectovaginal fistula. She's was doing well until recently with complaints of stool and air out of vagina.

## 2019-12-17 LAB — BACTERIA UR CULT: NORMAL

## 2020-01-06 ENCOUNTER — APPOINTMENT (OUTPATIENT)
Dept: UROLOGY | Facility: CLINIC | Age: 55
End: 2020-01-06
Payer: COMMERCIAL

## 2020-01-06 ENCOUNTER — OUTPATIENT (OUTPATIENT)
Dept: OUTPATIENT SERVICES | Facility: HOSPITAL | Age: 55
LOS: 1 days | End: 2020-01-06
Payer: COMMERCIAL

## 2020-01-06 DIAGNOSIS — Z96.7 PRESENCE OF OTHER BONE AND TENDON IMPLANTS: Chronic | ICD-10-CM

## 2020-01-06 DIAGNOSIS — N81.10 CYSTOCELE, UNSPECIFIED: Chronic | ICD-10-CM

## 2020-01-06 DIAGNOSIS — Z98.890 OTHER SPECIFIED POSTPROCEDURAL STATES: Chronic | ICD-10-CM

## 2020-01-06 DIAGNOSIS — R35.0 FREQUENCY OF MICTURITION: ICD-10-CM

## 2020-01-06 DIAGNOSIS — N82.3 FISTULA OF VAGINA TO LARGE INTESTINE: Chronic | ICD-10-CM

## 2020-01-06 DIAGNOSIS — S96.812A STRAIN OF OTHER SPECIFIED MUSCLES AND TENDONS AT ANKLE AND FOOT LEVEL, LEFT FOOT, INITIAL ENCOUNTER: Chronic | ICD-10-CM

## 2020-01-06 DIAGNOSIS — R31.0 GROSS HEMATURIA: ICD-10-CM

## 2020-01-06 DIAGNOSIS — Z41.9 ENCOUNTER FOR PROCEDURE FOR PURPOSES OTHER THAN REMEDYING HEALTH STATE, UNSPECIFIED: Chronic | ICD-10-CM

## 2020-01-06 PROCEDURE — 52000 CYSTOURETHROSCOPY: CPT

## 2020-01-08 DIAGNOSIS — R31.0 GROSS HEMATURIA: ICD-10-CM

## 2021-01-18 ENCOUNTER — APPOINTMENT (OUTPATIENT)
Dept: DISASTER EMERGENCY | Facility: CLINIC | Age: 56
End: 2021-01-18

## 2021-01-29 ENCOUNTER — APPOINTMENT (OUTPATIENT)
Dept: MAMMOGRAPHY | Facility: CLINIC | Age: 56
End: 2021-01-29
Payer: COMMERCIAL

## 2021-01-29 ENCOUNTER — APPOINTMENT (OUTPATIENT)
Dept: ULTRASOUND IMAGING | Facility: CLINIC | Age: 56
End: 2021-01-29
Payer: COMMERCIAL

## 2021-01-29 ENCOUNTER — OUTPATIENT (OUTPATIENT)
Dept: OUTPATIENT SERVICES | Facility: HOSPITAL | Age: 56
LOS: 1 days | End: 2021-01-29
Payer: COMMERCIAL

## 2021-01-29 DIAGNOSIS — Z98.890 OTHER SPECIFIED POSTPROCEDURAL STATES: Chronic | ICD-10-CM

## 2021-01-29 DIAGNOSIS — Z96.7 PRESENCE OF OTHER BONE AND TENDON IMPLANTS: Chronic | ICD-10-CM

## 2021-01-29 DIAGNOSIS — N82.3 FISTULA OF VAGINA TO LARGE INTESTINE: Chronic | ICD-10-CM

## 2021-01-29 DIAGNOSIS — N81.10 CYSTOCELE, UNSPECIFIED: Chronic | ICD-10-CM

## 2021-01-29 DIAGNOSIS — Z41.9 ENCOUNTER FOR PROCEDURE FOR PURPOSES OTHER THAN REMEDYING HEALTH STATE, UNSPECIFIED: Chronic | ICD-10-CM

## 2021-01-29 DIAGNOSIS — S96.812A STRAIN OF OTHER SPECIFIED MUSCLES AND TENDONS AT ANKLE AND FOOT LEVEL, LEFT FOOT, INITIAL ENCOUNTER: Chronic | ICD-10-CM

## 2021-01-29 DIAGNOSIS — Z00.8 ENCOUNTER FOR OTHER GENERAL EXAMINATION: ICD-10-CM

## 2021-01-29 PROCEDURE — 77067 SCR MAMMO BI INCL CAD: CPT | Mod: 26

## 2021-01-29 PROCEDURE — 77067 SCR MAMMO BI INCL CAD: CPT

## 2021-01-29 PROCEDURE — 77063 BREAST TOMOSYNTHESIS BI: CPT | Mod: 26

## 2021-01-29 PROCEDURE — 76641 ULTRASOUND BREAST COMPLETE: CPT | Mod: 26,50

## 2021-01-29 PROCEDURE — 76641 ULTRASOUND BREAST COMPLETE: CPT

## 2021-01-29 PROCEDURE — 77063 BREAST TOMOSYNTHESIS BI: CPT

## 2021-02-10 ENCOUNTER — APPOINTMENT (OUTPATIENT)
Dept: MAMMOGRAPHY | Facility: CLINIC | Age: 56
End: 2021-02-10
Payer: COMMERCIAL

## 2021-02-10 ENCOUNTER — APPOINTMENT (OUTPATIENT)
Dept: ULTRASOUND IMAGING | Facility: CLINIC | Age: 56
End: 2021-02-10
Payer: COMMERCIAL

## 2021-02-10 ENCOUNTER — OUTPATIENT (OUTPATIENT)
Dept: OUTPATIENT SERVICES | Facility: HOSPITAL | Age: 56
LOS: 1 days | End: 2021-02-10
Payer: COMMERCIAL

## 2021-02-10 DIAGNOSIS — N63.10 UNSPECIFIED LUMP IN THE RIGHT BREAST, UNSPECIFIED QUADRANT: ICD-10-CM

## 2021-02-10 DIAGNOSIS — N81.10 CYSTOCELE, UNSPECIFIED: Chronic | ICD-10-CM

## 2021-02-10 DIAGNOSIS — S96.812A STRAIN OF OTHER SPECIFIED MUSCLES AND TENDONS AT ANKLE AND FOOT LEVEL, LEFT FOOT, INITIAL ENCOUNTER: Chronic | ICD-10-CM

## 2021-02-10 DIAGNOSIS — Z98.890 OTHER SPECIFIED POSTPROCEDURAL STATES: Chronic | ICD-10-CM

## 2021-02-10 DIAGNOSIS — Z41.9 ENCOUNTER FOR PROCEDURE FOR PURPOSES OTHER THAN REMEDYING HEALTH STATE, UNSPECIFIED: Chronic | ICD-10-CM

## 2021-02-10 DIAGNOSIS — N82.3 FISTULA OF VAGINA TO LARGE INTESTINE: Chronic | ICD-10-CM

## 2021-02-10 DIAGNOSIS — Z96.7 PRESENCE OF OTHER BONE AND TENDON IMPLANTS: Chronic | ICD-10-CM

## 2021-02-10 PROCEDURE — 76642 ULTRASOUND BREAST LIMITED: CPT | Mod: 26,RT

## 2021-02-10 PROCEDURE — 76642 ULTRASOUND BREAST LIMITED: CPT

## 2021-02-10 PROCEDURE — G0279: CPT

## 2021-02-10 PROCEDURE — G0279: CPT | Mod: 26

## 2021-02-10 PROCEDURE — 77065 DX MAMMO INCL CAD UNI: CPT

## 2021-02-10 PROCEDURE — 77065 DX MAMMO INCL CAD UNI: CPT | Mod: 26,RT

## 2021-05-02 ENCOUNTER — TRANSCRIPTION ENCOUNTER (OUTPATIENT)
Age: 56
End: 2021-05-02

## 2021-07-26 ENCOUNTER — TRANSCRIPTION ENCOUNTER (OUTPATIENT)
Age: 56
End: 2021-07-26

## 2021-08-20 ENCOUNTER — APPOINTMENT (OUTPATIENT)
Dept: MAMMOGRAPHY | Facility: CLINIC | Age: 56
End: 2021-08-20
Payer: COMMERCIAL

## 2021-08-20 ENCOUNTER — OUTPATIENT (OUTPATIENT)
Dept: OUTPATIENT SERVICES | Facility: HOSPITAL | Age: 56
LOS: 1 days | End: 2021-08-20
Payer: COMMERCIAL

## 2021-08-20 DIAGNOSIS — Z96.7 PRESENCE OF OTHER BONE AND TENDON IMPLANTS: Chronic | ICD-10-CM

## 2021-08-20 DIAGNOSIS — Z98.890 OTHER SPECIFIED POSTPROCEDURAL STATES: Chronic | ICD-10-CM

## 2021-08-20 DIAGNOSIS — S96.812A STRAIN OF OTHER SPECIFIED MUSCLES AND TENDONS AT ANKLE AND FOOT LEVEL, LEFT FOOT, INITIAL ENCOUNTER: Chronic | ICD-10-CM

## 2021-08-20 DIAGNOSIS — N81.10 CYSTOCELE, UNSPECIFIED: Chronic | ICD-10-CM

## 2021-08-20 DIAGNOSIS — Z41.9 ENCOUNTER FOR PROCEDURE FOR PURPOSES OTHER THAN REMEDYING HEALTH STATE, UNSPECIFIED: Chronic | ICD-10-CM

## 2021-08-20 DIAGNOSIS — N60.01 SOLITARY CYST OF RIGHT BREAST: ICD-10-CM

## 2021-08-20 DIAGNOSIS — N82.3 FISTULA OF VAGINA TO LARGE INTESTINE: Chronic | ICD-10-CM

## 2021-08-20 PROCEDURE — 77065 DX MAMMO INCL CAD UNI: CPT | Mod: 26,RT

## 2021-08-20 PROCEDURE — G0279: CPT | Mod: 26

## 2021-08-20 PROCEDURE — 77065 DX MAMMO INCL CAD UNI: CPT

## 2021-08-20 PROCEDURE — G0279: CPT

## 2021-12-01 NOTE — H&P PST ADULT - NS NEC GEN PE MLT EXAM PC
No bruits; no thyromegaly or nodules Glycopyrrolate Counseling:  I discussed with the patient the risks of glycopyrrolate including but not limited to skin rash, drowsiness, dry mouth, difficulty urinating, and blurred vision.

## 2021-12-22 DIAGNOSIS — Z20.822 ENCOUNTER FOR PREPROCEDURAL LABORATORY EXAMINATION: ICD-10-CM

## 2021-12-22 DIAGNOSIS — Z01.812 ENCOUNTER FOR PREPROCEDURAL LABORATORY EXAMINATION: ICD-10-CM

## 2022-01-18 ENCOUNTER — APPOINTMENT (OUTPATIENT)
Dept: DISASTER EMERGENCY | Facility: CLINIC | Age: 57
End: 2022-01-18

## 2022-01-28 LAB — SARS-COV-2 N GENE NPH QL NAA+PROBE: DETECTED

## 2022-01-31 ENCOUNTER — APPOINTMENT (OUTPATIENT)
Dept: INTERNAL MEDICINE | Facility: CLINIC | Age: 57
End: 2022-01-31
Payer: COMMERCIAL

## 2022-01-31 ENCOUNTER — NON-APPOINTMENT (OUTPATIENT)
Age: 57
End: 2022-01-31

## 2022-01-31 VITALS
BODY MASS INDEX: 34.93 KG/M2 | RESPIRATION RATE: 16 BRPM | HEART RATE: 83 BPM | WEIGHT: 185 LBS | SYSTOLIC BLOOD PRESSURE: 110 MMHG | OXYGEN SATURATION: 97 % | TEMPERATURE: 98.8 F | DIASTOLIC BLOOD PRESSURE: 80 MMHG | HEIGHT: 61 IN

## 2022-01-31 DIAGNOSIS — R06.00 DYSPNEA, UNSPECIFIED: ICD-10-CM

## 2022-01-31 DIAGNOSIS — E66.9 OBESITY, UNSPECIFIED: ICD-10-CM

## 2022-01-31 DIAGNOSIS — Z87.891 PERSONAL HISTORY OF NICOTINE DEPENDENCE: ICD-10-CM

## 2022-01-31 PROCEDURE — 94010 BREATHING CAPACITY TEST: CPT

## 2022-01-31 PROCEDURE — 99204 OFFICE O/P NEW MOD 45 MIN: CPT | Mod: 25

## 2022-01-31 RX ORDER — LIRAGLUTIDE 6 MG/ML
18 INJECTION, SOLUTION SUBCUTANEOUS
Qty: 45 | Refills: 0 | Status: ACTIVE | COMMUNITY
Start: 2021-10-17

## 2022-01-31 RX ORDER — DOXYCYCLINE HYCLATE 100 MG/1
100 CAPSULE ORAL
Qty: 20 | Refills: 0 | Status: ACTIVE | COMMUNITY
Start: 2021-10-19

## 2022-01-31 RX ORDER — CONJUGATED ESTROGENS 0.62 MG/G
0.62 CREAM VAGINAL
Refills: 0 | Status: DISCONTINUED | COMMUNITY
Start: 2018-02-26 | End: 2022-01-31

## 2022-01-31 RX ORDER — NEBIVOLOL 10 MG/1
10 TABLET ORAL
Qty: 90 | Refills: 0 | Status: ACTIVE | COMMUNITY
Start: 2021-10-20

## 2022-01-31 RX ORDER — ROSUVASTATIN CALCIUM 10 MG/1
10 TABLET, FILM COATED ORAL
Qty: 90 | Refills: 0 | Status: ACTIVE | COMMUNITY
Start: 2021-12-09

## 2022-01-31 RX ORDER — NALOXEGOL OXALATE 25 MG/1
25 TABLET, FILM COATED ORAL
Qty: 90 | Refills: 0 | Status: DISCONTINUED | COMMUNITY
Start: 2016-09-07 | End: 2022-01-31

## 2022-01-31 RX ORDER — ESZOPICLONE 3 MG/1
3 TABLET, FILM COATED ORAL
Qty: 30 | Refills: 0 | Status: ACTIVE | COMMUNITY
Start: 2022-01-13

## 2022-01-31 RX ORDER — OXYCODONE AND ACETAMINOPHEN 10; 300 MG/1; MG/1
10-300 TABLET ORAL
Qty: 70 | Refills: 0 | Status: ACTIVE | COMMUNITY
Start: 2021-10-19

## 2022-01-31 RX ORDER — PEN NEEDLE, DIABETIC 32GX 5/32"
32G X 4 MM NEEDLE, DISPOSABLE MISCELLANEOUS
Qty: 100 | Refills: 0 | Status: ACTIVE | COMMUNITY
Start: 2021-08-24

## 2022-01-31 RX ORDER — CHLORHEXIDINE GLUCONATE, 0.12% ORAL RINSE 1.2 MG/ML
0.12 SOLUTION DENTAL
Qty: 473 | Refills: 0 | Status: ACTIVE | COMMUNITY
Start: 2021-10-19

## 2022-01-31 RX ORDER — DICLOFENAC SODIUM 3 G/100G
3 GEL TOPICAL
Qty: 200 | Refills: 0 | Status: DISCONTINUED | COMMUNITY
Start: 2016-08-26 | End: 2022-01-31

## 2022-01-31 RX ORDER — COVID-19 MOLECULAR TEST ASSAY
KIT MISCELLANEOUS
Qty: 1 | Refills: 0 | Status: ACTIVE | COMMUNITY
Start: 2021-10-10

## 2022-01-31 RX ORDER — NALOXEGOL OXALATE 12.5 MG/1
12.5 TABLET, FILM COATED ORAL
Qty: 90 | Refills: 0 | Status: ACTIVE | COMMUNITY
Start: 2021-12-16

## 2022-01-31 RX ORDER — OXYCODONE 5 MG/1
5 TABLET ORAL
Qty: 90 | Refills: 0 | Status: ACTIVE | COMMUNITY
Start: 2021-09-15

## 2022-01-31 NOTE — DISCUSSION/SUMMARY
[FreeTextEntry1] : Ms. Miranda is a 56-year-old female who is now scheduled for lap scopic gastric bypass surgery. Physically he is history of Rigo fundoplication. Patient had a polysomnography examination to 3 years go which was negative for obstructive sleep apnea. Spirometry shows mild obstructive lung disease. Present time there is no contraindication to planned procedure with sedation/anesthesia. The patient will benefit from DVT prophylaxis and incentive spirometry postoperatively.

## 2022-01-31 NOTE — HISTORY OF PRESENT ILLNESS
[TextBox_4] : Mrs. Miranda is a 56-year-old female presents for preoperative pulmonary evaluation was scheduled laparoscopic gastric bypass surgery. She has a previous history of a Rigo fundoplication in 2006 for a premalignant abdominal lesion. Patient has some shortness of breath on exertion. She denies any chest pain or palpitations. Ms. Miranda did have a polysomnography examination approximately 2-3 years ago which was negative for obstructive sleep apnea. Her [place] sleepiness score is 2. She does have some daytime tiredness. Patient denies any nocturnal symptoms of cough or shortness of breath. There is no history of asthma or seasonal allergic rhinitis. She is a former smoker who stopped smoking over 30 years ago.

## 2022-01-31 NOTE — PROCEDURE
[FreeTextEntry1] : Spirometry shows mild obstructive lung disease. FEV1 is 1.84 L which is 78% predicted. FVC is 2.65 L which is 87% predicted.FEV1/FVC ratio 70%.

## 2022-02-18 ENCOUNTER — RESULT REVIEW (OUTPATIENT)
Age: 57
End: 2022-02-18

## 2022-02-18 ENCOUNTER — OUTPATIENT (OUTPATIENT)
Dept: OUTPATIENT SERVICES | Facility: HOSPITAL | Age: 57
LOS: 1 days | Discharge: HOME | End: 2022-02-18
Payer: COMMERCIAL

## 2022-02-18 VITALS
HEART RATE: 80 BPM | OXYGEN SATURATION: 96 % | RESPIRATION RATE: 20 BRPM | WEIGHT: 184.97 LBS | SYSTOLIC BLOOD PRESSURE: 138 MMHG | TEMPERATURE: 97 F | HEIGHT: 61 IN | DIASTOLIC BLOOD PRESSURE: 67 MMHG

## 2022-02-18 DIAGNOSIS — E66.01 MORBID (SEVERE) OBESITY DUE TO EXCESS CALORIES: ICD-10-CM

## 2022-02-18 DIAGNOSIS — K21.9 GASTRO-ESOPHAGEAL REFLUX DISEASE WITHOUT ESOPHAGITIS: ICD-10-CM

## 2022-02-18 DIAGNOSIS — N82.3 FISTULA OF VAGINA TO LARGE INTESTINE: Chronic | ICD-10-CM

## 2022-02-18 DIAGNOSIS — Z41.9 ENCOUNTER FOR PROCEDURE FOR PURPOSES OTHER THAN REMEDYING HEALTH STATE, UNSPECIFIED: Chronic | ICD-10-CM

## 2022-02-18 DIAGNOSIS — Z98.890 OTHER SPECIFIED POSTPROCEDURAL STATES: Chronic | ICD-10-CM

## 2022-02-18 DIAGNOSIS — N81.10 CYSTOCELE, UNSPECIFIED: Chronic | ICD-10-CM

## 2022-02-18 DIAGNOSIS — S96.812A STRAIN OF OTHER SPECIFIED MUSCLES AND TENDONS AT ANKLE AND FOOT LEVEL, LEFT FOOT, INITIAL ENCOUNTER: Chronic | ICD-10-CM

## 2022-02-18 DIAGNOSIS — Z96.7 PRESENCE OF OTHER BONE AND TENDON IMPLANTS: Chronic | ICD-10-CM

## 2022-02-18 PROCEDURE — 88305 TISSUE EXAM BY PATHOLOGIST: CPT

## 2022-02-18 PROCEDURE — 88312 SPECIAL STAINS GROUP 1: CPT | Mod: 26

## 2022-02-18 PROCEDURE — 88313 SPECIAL STAINS GROUP 2: CPT

## 2022-02-18 PROCEDURE — 88313 SPECIAL STAINS GROUP 2: CPT | Mod: 26

## 2022-02-18 PROCEDURE — 88312 SPECIAL STAINS GROUP 1: CPT

## 2022-02-18 PROCEDURE — 88305 TISSUE EXAM BY PATHOLOGIST: CPT | Mod: 26

## 2022-02-18 RX ORDER — DIAZEPAM 5 MG
1 TABLET ORAL
Qty: 0 | Refills: 0 | DISCHARGE

## 2022-02-18 NOTE — ASU PATIENT PROFILE, ADULT - NSICDXPASTMEDICALHX_GEN_ALL_CORE_FT
PAST MEDICAL HISTORY:  Acid reflux     Ahn esophagus     Chronic UTI     Constipation     Endometriosis     Hemorrhoids, unspecified hemorrhoid type     Hepatic adenoma History of. Surgically removed 2006    Hormone replacement therapy     Hyperlipidemia     Hypertension     Incontinence of feces, unspecified fecal incontinence type     Injury to sacral nerve root, subsequent encounter     Irritable bowel syndrome with both constipation and diarrhea     Obesity     Pelvic floor dysfunction     Pudendal neuralgia     Rectocele History of 2015    Stress incontinence, female     Thyroid cyst     Torn meniscus left    UTI (urinary tract infection) frequent    Vitamin D deficiency

## 2022-02-18 NOTE — ASU PATIENT PROFILE, ADULT - NSICDXPASTSURGICALHX_GEN_ALL_CORE_FT
PAST SURGICAL HISTORY:  Cystocele with rectocele 5/11/2017    Elective surgery removal of hepatic adenoma 2006    Hepatic adenoma resected 2006    History of rectal surgery 2013 interstimulator for rectal incontinence  later removed 2016    Rectovaginal fistula surgery - 07/2017    S/P appendectomy 1979    S/P breast augmentation reduction 2003    S/P colonoscopy multiple    S/P exploratory laparotomy cholecystectomy, ROZINA, endometriosis    S/P hysterectomy with oophorectomy with mesh for stress incontinence    S/P LASIK surgery of both eyes 2000    S/P Nissen fundoplication (with gastrostomy tube placement) for barrets esophagus 2006    S/P ORIF (open reduction internal fixation) fracture left foot 2016    S/P tonsillectomy 1979    Stress incontinence, female right side of mesh "cut" adjusted    Tendon rupture, post-op repaired right elbow 2011    Traumatic rupture of plantar fascia of left foot, initial encounter surgery

## 2022-02-18 NOTE — ASU PATIENT PROFILE, ADULT - FALL HARM RISK - UNIVERSAL INTERVENTIONS
Bed in lowest position, wheels locked, appropriate side rails in place/Call bell, personal items and telephone in reach/Instruct patient to call for assistance before getting out of bed or chair/Non-slip footwear when patient is out of bed/[place] to call system/Physically safe environment - no spills, clutter or unnecessary equipment/Purposeful Proactive Rounding/Room/bathroom lighting operational, light cord in reach

## 2022-02-24 DIAGNOSIS — Z88.3 ALLERGY STATUS TO OTHER ANTI-INFECTIVE AGENTS: ICD-10-CM

## 2022-02-24 DIAGNOSIS — K21.00 GASTRO-ESOPHAGEAL REFLUX DISEASE WITH ESOPHAGITIS, WITHOUT BLEEDING: ICD-10-CM

## 2022-02-24 DIAGNOSIS — Z88.0 ALLERGY STATUS TO PENICILLIN: ICD-10-CM

## 2022-02-24 DIAGNOSIS — Z90.710 ACQUIRED ABSENCE OF BOTH CERVIX AND UTERUS: ICD-10-CM

## 2022-02-24 DIAGNOSIS — D64.9 ANEMIA, UNSPECIFIED: ICD-10-CM

## 2022-02-24 DIAGNOSIS — K44.9 DIAPHRAGMATIC HERNIA WITHOUT OBSTRUCTION OR GANGRENE: ICD-10-CM

## 2022-02-24 DIAGNOSIS — R13.10 DYSPHAGIA, UNSPECIFIED: ICD-10-CM

## 2022-02-24 DIAGNOSIS — I10 ESSENTIAL (PRIMARY) HYPERTENSION: ICD-10-CM

## 2022-02-24 DIAGNOSIS — Z88.2 ALLERGY STATUS TO SULFONAMIDES: ICD-10-CM

## 2022-02-24 DIAGNOSIS — K58.9 IRRITABLE BOWEL SYNDROME WITHOUT DIARRHEA: ICD-10-CM

## 2022-02-24 DIAGNOSIS — K21.9 GASTRO-ESOPHAGEAL REFLUX DISEASE WITHOUT ESOPHAGITIS: ICD-10-CM

## 2022-02-24 DIAGNOSIS — K29.70 GASTRITIS, UNSPECIFIED, WITHOUT BLEEDING: ICD-10-CM

## 2022-02-24 DIAGNOSIS — G47.33 OBSTRUCTIVE SLEEP APNEA (ADULT) (PEDIATRIC): ICD-10-CM

## 2022-02-24 DIAGNOSIS — Z87.891 PERSONAL HISTORY OF NICOTINE DEPENDENCE: ICD-10-CM

## 2022-02-24 DIAGNOSIS — E66.01 MORBID (SEVERE) OBESITY DUE TO EXCESS CALORIES: ICD-10-CM

## 2022-02-25 ENCOUNTER — APPOINTMENT (OUTPATIENT)
Dept: MAMMOGRAPHY | Facility: CLINIC | Age: 57
End: 2022-02-25

## 2022-02-25 ENCOUNTER — APPOINTMENT (OUTPATIENT)
Dept: ULTRASOUND IMAGING | Facility: CLINIC | Age: 57
End: 2022-02-25

## 2022-02-28 ENCOUNTER — OUTPATIENT (OUTPATIENT)
Dept: OUTPATIENT SERVICES | Facility: HOSPITAL | Age: 57
LOS: 1 days | End: 2022-02-28
Payer: COMMERCIAL

## 2022-02-28 VITALS
HEIGHT: 62 IN | HEART RATE: 82 BPM | DIASTOLIC BLOOD PRESSURE: 74 MMHG | SYSTOLIC BLOOD PRESSURE: 135 MMHG | RESPIRATION RATE: 16 BRPM | WEIGHT: 182.1 LBS | TEMPERATURE: 99 F

## 2022-02-28 DIAGNOSIS — N81.10 CYSTOCELE, UNSPECIFIED: Chronic | ICD-10-CM

## 2022-02-28 DIAGNOSIS — S96.812A STRAIN OF OTHER SPECIFIED MUSCLES AND TENDONS AT ANKLE AND FOOT LEVEL, LEFT FOOT, INITIAL ENCOUNTER: Chronic | ICD-10-CM

## 2022-02-28 DIAGNOSIS — Z98.890 OTHER SPECIFIED POSTPROCEDURAL STATES: Chronic | ICD-10-CM

## 2022-02-28 DIAGNOSIS — N82.3 FISTULA OF VAGINA TO LARGE INTESTINE: Chronic | ICD-10-CM

## 2022-02-28 DIAGNOSIS — K21.9 GASTRO-ESOPHAGEAL REFLUX DISEASE WITHOUT ESOPHAGITIS: ICD-10-CM

## 2022-02-28 DIAGNOSIS — Z96.7 PRESENCE OF OTHER BONE AND TENDON IMPLANTS: Chronic | ICD-10-CM

## 2022-02-28 DIAGNOSIS — E66.9 OBESITY, UNSPECIFIED: ICD-10-CM

## 2022-02-28 DIAGNOSIS — Z01.818 ENCOUNTER FOR OTHER PREPROCEDURAL EXAMINATION: ICD-10-CM

## 2022-02-28 DIAGNOSIS — I10 ESSENTIAL (PRIMARY) HYPERTENSION: ICD-10-CM

## 2022-02-28 DIAGNOSIS — Z41.9 ENCOUNTER FOR PROCEDURE FOR PURPOSES OTHER THAN REMEDYING HEALTH STATE, UNSPECIFIED: Chronic | ICD-10-CM

## 2022-02-28 LAB
A1C WITH ESTIMATED AVERAGE GLUCOSE RESULT: 5.4 % — SIGNIFICANT CHANGE UP (ref 4–5.6)
ALBUMIN SERPL ELPH-MCNC: 3.9 G/DL — SIGNIFICANT CHANGE UP (ref 3.3–5)
ANION GAP SERPL CALC-SCNC: 3 MMOL/L — LOW (ref 5–17)
APPEARANCE UR: CLEAR — SIGNIFICANT CHANGE UP
APTT BLD: 34.3 SEC — SIGNIFICANT CHANGE UP (ref 27.5–35.5)
BASOPHILS # BLD AUTO: 0.05 K/UL — SIGNIFICANT CHANGE UP (ref 0–0.2)
BASOPHILS NFR BLD AUTO: 0.8 % — SIGNIFICANT CHANGE UP (ref 0–2)
BILIRUB UR-MCNC: NEGATIVE — SIGNIFICANT CHANGE UP
BLOOD GAS SOURCE: SIGNIFICANT CHANGE UP
BUN SERPL-MCNC: 15 MG/DL — SIGNIFICANT CHANGE UP (ref 7–23)
CALCIUM SERPL-MCNC: 9.7 MG/DL — SIGNIFICANT CHANGE UP (ref 8.5–10.1)
CHLORIDE SERPL-SCNC: 106 MMOL/L — SIGNIFICANT CHANGE UP (ref 96–108)
CO2 SERPL-SCNC: 30 MMOL/L — SIGNIFICANT CHANGE UP (ref 22–31)
COHGB MFR BLDV: 2.5 % — SIGNIFICANT CHANGE UP
COLOR SPEC: YELLOW — SIGNIFICANT CHANGE UP
CREAT SERPL-MCNC: 0.92 MG/DL — SIGNIFICANT CHANGE UP (ref 0.5–1.3)
DIFF PNL FLD: ABNORMAL
EOSINOPHIL # BLD AUTO: 0.08 K/UL — SIGNIFICANT CHANGE UP (ref 0–0.5)
EOSINOPHIL NFR BLD AUTO: 1.3 % — SIGNIFICANT CHANGE UP (ref 0–6)
ESTIMATED AVERAGE GLUCOSE: 108 MG/DL — SIGNIFICANT CHANGE UP (ref 68–114)
GLUCOSE SERPL-MCNC: 99 MG/DL — SIGNIFICANT CHANGE UP (ref 70–99)
GLUCOSE UR QL: NEGATIVE — SIGNIFICANT CHANGE UP
HCT VFR BLD CALC: 42.5 % — SIGNIFICANT CHANGE UP (ref 34.5–45)
HGB BLD-MCNC: 14.4 G/DL — SIGNIFICANT CHANGE UP (ref 11.5–15.5)
IMM GRANULOCYTES NFR BLD AUTO: 0.2 % — SIGNIFICANT CHANGE UP (ref 0–1.5)
INR BLD: 1.11 RATIO — SIGNIFICANT CHANGE UP (ref 0.88–1.16)
KETONES UR-MCNC: NEGATIVE — SIGNIFICANT CHANGE UP
LEUKOCYTE ESTERASE UR-ACNC: ABNORMAL
LYMPHOCYTES # BLD AUTO: 1.77 K/UL — SIGNIFICANT CHANGE UP (ref 1–3.3)
LYMPHOCYTES # BLD AUTO: 29.5 % — SIGNIFICANT CHANGE UP (ref 13–44)
MCHC RBC-ENTMCNC: 29.8 PG — SIGNIFICANT CHANGE UP (ref 27–34)
MCHC RBC-ENTMCNC: 33.9 GM/DL — SIGNIFICANT CHANGE UP (ref 32–36)
MCV RBC AUTO: 88 FL — SIGNIFICANT CHANGE UP (ref 80–100)
MONOCYTES # BLD AUTO: 0.39 K/UL — SIGNIFICANT CHANGE UP (ref 0–0.9)
MONOCYTES NFR BLD AUTO: 6.5 % — SIGNIFICANT CHANGE UP (ref 2–14)
NEUTROPHILS # BLD AUTO: 3.7 K/UL — SIGNIFICANT CHANGE UP (ref 1.8–7.4)
NEUTROPHILS NFR BLD AUTO: 61.7 % — SIGNIFICANT CHANGE UP (ref 43–77)
NITRITE UR-MCNC: NEGATIVE — SIGNIFICANT CHANGE UP
PH UR: 6 — SIGNIFICANT CHANGE UP (ref 5–8)
PLATELET # BLD AUTO: 233 K/UL — SIGNIFICANT CHANGE UP (ref 150–400)
POTASSIUM SERPL-MCNC: 4.2 MMOL/L — SIGNIFICANT CHANGE UP (ref 3.5–5.3)
POTASSIUM SERPL-SCNC: 4.2 MMOL/L — SIGNIFICANT CHANGE UP (ref 3.5–5.3)
PROT UR-MCNC: NEGATIVE — SIGNIFICANT CHANGE UP
PROTHROM AB SERPL-ACNC: 12.9 SEC — SIGNIFICANT CHANGE UP (ref 10.5–13.4)
RBC # BLD: 4.83 M/UL — SIGNIFICANT CHANGE UP (ref 3.8–5.2)
RBC # FLD: 12.9 % — SIGNIFICANT CHANGE UP (ref 10.3–14.5)
SODIUM SERPL-SCNC: 139 MMOL/L — SIGNIFICANT CHANGE UP (ref 135–145)
SP GR SPEC: 1.01 — SIGNIFICANT CHANGE UP (ref 1.01–1.02)
UROBILINOGEN FLD QL: NEGATIVE — SIGNIFICANT CHANGE UP
WBC # BLD: 6 K/UL — SIGNIFICANT CHANGE UP (ref 3.8–10.5)
WBC # FLD AUTO: 6 K/UL — SIGNIFICANT CHANGE UP (ref 3.8–10.5)

## 2022-02-28 PROCEDURE — 82040 ASSAY OF SERUM ALBUMIN: CPT

## 2022-02-28 PROCEDURE — 86850 RBC ANTIBODY SCREEN: CPT

## 2022-02-28 PROCEDURE — 82375 ASSAY CARBOXYHB QUANT: CPT

## 2022-02-28 PROCEDURE — 36415 COLL VENOUS BLD VENIPUNCTURE: CPT

## 2022-02-28 PROCEDURE — 71046 X-RAY EXAM CHEST 2 VIEWS: CPT

## 2022-02-28 PROCEDURE — 85025 COMPLETE CBC W/AUTO DIFF WBC: CPT

## 2022-02-28 PROCEDURE — 86922 COMPATIBILITY TEST ANTIGLOB: CPT

## 2022-02-28 PROCEDURE — 85730 THROMBOPLASTIN TIME PARTIAL: CPT

## 2022-02-28 PROCEDURE — 86921 COMPATIBILITY TEST INCUBATE: CPT

## 2022-02-28 PROCEDURE — 93005 ELECTROCARDIOGRAM TRACING: CPT

## 2022-02-28 PROCEDURE — 85610 PROTHROMBIN TIME: CPT

## 2022-02-28 PROCEDURE — 86920 COMPATIBILITY TEST SPIN: CPT

## 2022-02-28 PROCEDURE — 80048 BASIC METABOLIC PNL TOTAL CA: CPT

## 2022-02-28 PROCEDURE — 86901 BLOOD TYPING SEROLOGIC RH(D): CPT

## 2022-02-28 PROCEDURE — 81001 URINALYSIS AUTO W/SCOPE: CPT

## 2022-02-28 PROCEDURE — 93010 ELECTROCARDIOGRAM REPORT: CPT

## 2022-02-28 PROCEDURE — 86900 BLOOD TYPING SEROLOGIC ABO: CPT

## 2022-02-28 PROCEDURE — 83036 HEMOGLOBIN GLYCOSYLATED A1C: CPT

## 2022-02-28 PROCEDURE — 71046 X-RAY EXAM CHEST 2 VIEWS: CPT | Mod: 26

## 2022-02-28 RX ORDER — LIRAGLUTIDE 6 MG/ML
0 INJECTION SUBCUTANEOUS
Qty: 0 | Refills: 0 | DISCHARGE

## 2022-02-28 NOTE — H&P PST ADULT - PROBLEM SELECTOR PLAN 1
Pre op and chlorhexidine instructions given and explained.  Avoid NSAIDs and OTC supplements.   Patient verbalized understanding  medical consult requested by surgeon 3/2/22  covid 19 swab scheduled 3/4/22, advised to quarantine after test

## 2022-02-28 NOTE — H&P PST ADULT - ASSESSMENT
57 y/o female with history of suggs's esophagus, GERD and obesity presents to [facility] for scheduled gastric sleeve on 3/7/22 for weight loss and GERD.    CAPRINI SCORE    AGE RELATED RISK FACTORS                                                       MOBILITY RELATED FACTORS  [ ] Age 41-60 years                                            (1 Point)                  [ ] Bed rest                                                        (1 Point)  [x ] Age: 61-74 years                                           (2 Points)                [ ] Plaster cast                                                   (2 Points)  [ ] Age= 75 years                                              (3 Points)                 [ ] Bed bound for more than 72 hours                   (2 Points)    DISEASE RELATED RISK FACTORS                                               GENDER SPECIFIC FACTORS  [ ] Edema in the lower extremities                       (1 Point)                  [ ] Pregnancy                                                     (1 Point)  [ ] Varicose veins                                               (1 Point)                  [ ] Post-partum < 6 weeks                                   (1 Point)             [x ] BMI > 25 Kg/m2                                            (1 Point)                  [ ] Hormonal therapy  or oral contraception            (1 Point)                 [ ] Sepsis (in the previous month)                        (1 Point)                  [ ] History of pregnancy complications  [ ] Pneumonia or serious lung disease                                               [ ] Unexplained or recurrent                       (1 Point)           (in the previous month)                               (1 Point)  [ ] Abnormal pulmonary function test                     (1 Point)                 SURGERY RELATED RISK FACTORS  [ ] Acute myocardial infarction                              (1 Point)                 [ ]  Section                                            (1 Point)  [ ] Congestive heart failure (in the previous month)  (1 Point)                 [ ] Minor surgery                                                 (1 Point)   [ ] Inflammatory bowel disease                             (1 Point)                 [ ] Arthroscopic surgery                                        (2 Points)  [ ] Central venous access                                    (2 Points)                [x ] General surgery lasting more than 45 minutes   (2 Points)       [ ] Stroke (in the previous month)                          (5 Points)               [ ] Elective arthroplasty                                        (5 Points)            [ ] malignancy                                                             (2 points)                                                                                                                                 HEMATOLOGY RELATED FACTORS                                                 TRAUMA RELATED RISK FACTORS  [ ] Prior episodes of VTE                                     (3 Points)                 [ ] Fracture of the hip, pelvis, or leg                       (5 Points)  [ ] Positive family history for VTE                         (3 Points)                 [ ] Acute spinal cord injury (in the previous month)  (5 Points)  [ ] Prothrombin 14557 A                                      (3 Points)                 [ ] Paralysis  (less than 1 month)                          (5 Points)  [ ] Factor V Leiden                                             (3 Points)                 [ ] Multiple Trauma within 1 month                         (5 Points)  [ ] Lupus anticoagulants                                     (3 Points)                                                           [ ] Anticardiolipin antibodies                                (3 Points)                                                       [ ] High homocysteine in the blood                      (3 Points)                                             [ ] Other congenital or acquired thrombophilia       (3 Points)                                                [ ] Heparin induced thrombocytopenia                  (3 Points)                                          Total Score [      5    ]    The Caprini score indicates this patient is at risk for a VTE event (score 3-5).  Most surgical patients in this group would benefit from pharmacologic prophylaxis.  The surgical team will determine the balance between VTE risk and bleeding risk

## 2022-02-28 NOTE — H&P PST ADULT - NS PRO TALK SOMEONE YN
Muscle Strain  A muscle strain is an injury that occurs when a muscle is stretched beyond its normal length. Usually a small number of muscle fibers are torn when this happens. Muscle strain is rated in degrees. First-degree strains have the least amount of muscle fiber tearing and pain. Second-degree and third-degree strains have increasingly more tearing and pain.  Usually, recovery from muscle strain takes 1-2 weeks. Complete healing takes 5-6 weeks.  What are the causes?  Muscle strain happens when a sudden, violent force placed on a muscle stretches it too far. This may occur with lifting, sports, or a fall.  What increases the risk?  Muscle strain is especially common in athletes.  What are the signs or symptoms?  At the site of the muscle strain, there may be:  · Pain.  · Bruising.  · Swelling.  · Difficulty using the muscle due to pain or lack of normal function.  How is this diagnosed?  Your health care provider will perform a physical exam and ask about your medical history.  How is this treated?  Often, the best treatment for a muscle strain is resting, icing, and applying cold compresses to the injured area.  Follow these instructions at home:  · Use the PRICE method of treatment to promote muscle healing during the first 2-3 days after your injury. The PRICE method involves:  ¨ Protecting the muscle from being injured again.  ¨ Restricting your activity and resting the injured body part.  ¨ Icing your injury. To do this, put ice in a plastic bag. Place a towel between your skin and the bag. Then, apply the ice and leave it on from 15-20 minutes each hour. After the third day, switch to moist heat packs.  ¨ Apply compression to the injured area with a splint or elastic bandage. Be careful not to wrap it too tightly. This may interfere with blood circulation or increase swelling.  ¨ Elevate the injured body part above the level of your heart as often as you can.  · Only take over-the-counter or  [employer] Interactive Patient Education © 2017 [employer] symptoms.  · You have sores or irritated skin from wearing your neck collar.  Get help right away if:  · You have very bad pain.  · You have any of the following in any part of your body:  ¨ Loss of feeling (numbness).  ¨ Tingling.  ¨ Weakness.  · You cannot move a part of your body (you have paralysis).  · Your activity level does not improve.  Summary  · A cervical sprain is a stretch or tear in the tissues that connect bones (ligaments) in the neck.  · If you have a neck (cervical) collar, do not take off the collar unless your doctor says that this is safe.  · Put ice on affected areas as told by your doctor.  · Put heat on affected areas as told by your doctor.  · Good posture and regular exercise can help prevent a neck sprain from happening again.  This information is not intended to replace advice given to you by your health care provider. Make sure you discuss any questions you have with your health care provider.  Document Released: 06/05/2009 Document Revised: 08/29/2017 Document Reviewed: 08/29/2017  [employer] Interactive Patient Education © 2017 [employer]     no

## 2022-02-28 NOTE — H&P PST ADULT - HISTORY OF PRESENT ILLNESS
55 y/o female with history of suggs's esophagus, GERD and obesity presents to PST for scheduled gastric bypass on 3/7/22 for weight loss and GERD.

## 2022-03-01 DIAGNOSIS — Z01.818 ENCOUNTER FOR OTHER PREPROCEDURAL EXAMINATION: ICD-10-CM

## 2022-03-01 DIAGNOSIS — K21.9 GASTRO-ESOPHAGEAL REFLUX DISEASE WITHOUT ESOPHAGITIS: ICD-10-CM

## 2022-03-07 ENCOUNTER — INPATIENT (INPATIENT)
Facility: HOSPITAL | Age: 57
LOS: 0 days | Discharge: HOME | DRG: 621 | End: 2022-03-08
Attending: SURGERY | Admitting: SURGERY
Payer: COMMERCIAL

## 2022-03-07 ENCOUNTER — RESULT REVIEW (OUTPATIENT)
Age: 57
End: 2022-03-07

## 2022-03-07 VITALS
DIASTOLIC BLOOD PRESSURE: 67 MMHG | OXYGEN SATURATION: 100 % | WEIGHT: 182.1 LBS | TEMPERATURE: 97 F | SYSTOLIC BLOOD PRESSURE: 135 MMHG | HEIGHT: 62 IN | HEART RATE: 64 BPM | RESPIRATION RATE: 18 BRPM

## 2022-03-07 DIAGNOSIS — K22.70 BARRETT'S ESOPHAGUS WITHOUT DYSPLASIA: ICD-10-CM

## 2022-03-07 DIAGNOSIS — Z98.890 OTHER SPECIFIED POSTPROCEDURAL STATES: Chronic | ICD-10-CM

## 2022-03-07 DIAGNOSIS — Z88.0 ALLERGY STATUS TO PENICILLIN: ICD-10-CM

## 2022-03-07 DIAGNOSIS — K21.00 GASTRO-ESOPHAGEAL REFLUX DISEASE WITH ESOPHAGITIS, WITHOUT BLEEDING: ICD-10-CM

## 2022-03-07 DIAGNOSIS — Z88.8 ALLERGY STATUS TO OTHER DRUGS, MEDICAMENTS AND BIOLOGICAL SUBSTANCES STATUS: ICD-10-CM

## 2022-03-07 DIAGNOSIS — R13.10 DYSPHAGIA, UNSPECIFIED: ICD-10-CM

## 2022-03-07 DIAGNOSIS — S96.812A STRAIN OF OTHER SPECIFIED MUSCLES AND TENDONS AT ANKLE AND FOOT LEVEL, LEFT FOOT, INITIAL ENCOUNTER: Chronic | ICD-10-CM

## 2022-03-07 DIAGNOSIS — K44.9 DIAPHRAGMATIC HERNIA WITHOUT OBSTRUCTION OR GANGRENE: ICD-10-CM

## 2022-03-07 DIAGNOSIS — Z41.9 ENCOUNTER FOR PROCEDURE FOR PURPOSES OTHER THAN REMEDYING HEALTH STATE, UNSPECIFIED: Chronic | ICD-10-CM

## 2022-03-07 DIAGNOSIS — Z96.7 PRESENCE OF OTHER BONE AND TENDON IMPLANTS: Chronic | ICD-10-CM

## 2022-03-07 DIAGNOSIS — K21.9 GASTRO-ESOPHAGEAL REFLUX DISEASE WITHOUT ESOPHAGITIS: ICD-10-CM

## 2022-03-07 DIAGNOSIS — Z90.710 ACQUIRED ABSENCE OF BOTH CERVIX AND UTERUS: ICD-10-CM

## 2022-03-07 DIAGNOSIS — I10 ESSENTIAL (PRIMARY) HYPERTENSION: ICD-10-CM

## 2022-03-07 DIAGNOSIS — N81.10 CYSTOCELE, UNSPECIFIED: Chronic | ICD-10-CM

## 2022-03-07 DIAGNOSIS — N82.3 FISTULA OF VAGINA TO LARGE INTESTINE: Chronic | ICD-10-CM

## 2022-03-07 DIAGNOSIS — Z90.49 ACQUIRED ABSENCE OF OTHER SPECIFIED PARTS OF DIGESTIVE TRACT: ICD-10-CM

## 2022-03-07 DIAGNOSIS — E78.5 HYPERLIPIDEMIA, UNSPECIFIED: ICD-10-CM

## 2022-03-07 DIAGNOSIS — Z88.2 ALLERGY STATUS TO SULFONAMIDES: ICD-10-CM

## 2022-03-07 DIAGNOSIS — E66.01 MORBID (SEVERE) OBESITY DUE TO EXCESS CALORIES: ICD-10-CM

## 2022-03-07 PROCEDURE — 80048 BASIC METABOLIC PNL TOTAL CA: CPT

## 2022-03-07 PROCEDURE — C9113: CPT

## 2022-03-07 PROCEDURE — C9399: CPT

## 2022-03-07 PROCEDURE — C1889: CPT

## 2022-03-07 PROCEDURE — 88307 TISSUE EXAM BY PATHOLOGIST: CPT | Mod: 26

## 2022-03-07 PROCEDURE — 88305 TISSUE EXAM BY PATHOLOGIST: CPT

## 2022-03-07 PROCEDURE — 82962 GLUCOSE BLOOD TEST: CPT

## 2022-03-07 PROCEDURE — 36415 COLL VENOUS BLD VENIPUNCTURE: CPT

## 2022-03-07 PROCEDURE — 88305 TISSUE EXAM BY PATHOLOGIST: CPT | Mod: 26

## 2022-03-07 PROCEDURE — 85025 COMPLETE CBC W/AUTO DIFF WBC: CPT

## 2022-03-07 PROCEDURE — 88307 TISSUE EXAM BY PATHOLOGIST: CPT

## 2022-03-07 RX ORDER — ONDANSETRON 8 MG/1
4 TABLET, FILM COATED ORAL EVERY 6 HOURS
Refills: 0 | Status: DISCONTINUED | OUTPATIENT
Start: 2022-03-07 | End: 2022-03-08

## 2022-03-07 RX ORDER — SODIUM CHLORIDE 9 MG/ML
1000 INJECTION, SOLUTION INTRAVENOUS
Refills: 0 | Status: DISCONTINUED | OUTPATIENT
Start: 2022-03-07 | End: 2022-03-07

## 2022-03-07 RX ORDER — SODIUM CHLORIDE 9 MG/ML
1000 INJECTION, SOLUTION INTRAVENOUS
Refills: 0 | Status: DISCONTINUED | OUTPATIENT
Start: 2022-03-07 | End: 2022-03-08

## 2022-03-07 RX ORDER — HYDROMORPHONE HYDROCHLORIDE 2 MG/ML
0.5 INJECTION INTRAMUSCULAR; INTRAVENOUS; SUBCUTANEOUS
Refills: 0 | Status: DISCONTINUED | OUTPATIENT
Start: 2022-03-07 | End: 2022-03-07

## 2022-03-07 RX ORDER — ACETAMINOPHEN 500 MG
1000 TABLET ORAL ONCE
Refills: 0 | Status: DISCONTINUED | OUTPATIENT
Start: 2022-03-07 | End: 2022-03-08

## 2022-03-07 RX ORDER — INFLUENZA VIRUS VACCINE 15; 15; 15; 15 UG/.5ML; UG/.5ML; UG/.5ML; UG/.5ML
0.5 SUSPENSION INTRAMUSCULAR ONCE
Refills: 0 | Status: COMPLETED | OUTPATIENT
Start: 2022-03-07 | End: 2022-03-07

## 2022-03-07 RX ORDER — FENTANYL CITRATE 50 UG/ML
50 INJECTION INTRAVENOUS
Refills: 0 | Status: DISCONTINUED | OUTPATIENT
Start: 2022-03-07 | End: 2022-03-07

## 2022-03-07 RX ORDER — KETOROLAC TROMETHAMINE 30 MG/ML
30 SYRINGE (ML) INJECTION EVERY 6 HOURS
Refills: 0 | Status: COMPLETED | OUTPATIENT
Start: 2022-03-07 | End: 2022-03-08

## 2022-03-07 RX ORDER — ACETAMINOPHEN 500 MG
1000 TABLET ORAL ONCE
Refills: 0 | Status: COMPLETED | OUTPATIENT
Start: 2022-03-07 | End: 2022-03-07

## 2022-03-07 RX ORDER — APREPITANT 80 MG/1
80 CAPSULE ORAL ONCE
Refills: 0 | Status: COMPLETED | OUTPATIENT
Start: 2022-03-07 | End: 2022-03-07

## 2022-03-07 RX ORDER — OXYCODONE HYDROCHLORIDE 5 MG/1
5 TABLET ORAL ONCE
Refills: 0 | Status: DISCONTINUED | OUTPATIENT
Start: 2022-03-07 | End: 2022-03-07

## 2022-03-07 RX ORDER — ENOXAPARIN SODIUM 100 MG/ML
40 INJECTION SUBCUTANEOUS EVERY 12 HOURS
Refills: 0 | Status: DISCONTINUED | OUTPATIENT
Start: 2022-03-07 | End: 2022-03-08

## 2022-03-07 RX ORDER — PANTOPRAZOLE SODIUM 20 MG/1
40 TABLET, DELAYED RELEASE ORAL EVERY 24 HOURS
Refills: 0 | Status: DISCONTINUED | OUTPATIENT
Start: 2022-03-07 | End: 2022-03-08

## 2022-03-07 RX ORDER — OXYCODONE HYDROCHLORIDE 5 MG/1
10 TABLET ORAL EVERY 4 HOURS
Refills: 0 | Status: DISCONTINUED | OUTPATIENT
Start: 2022-03-07 | End: 2022-03-08

## 2022-03-07 RX ORDER — ACETAMINOPHEN 500 MG
1000 TABLET ORAL ONCE
Refills: 0 | Status: COMPLETED | OUTPATIENT
Start: 2022-03-07 | End: 2022-03-08

## 2022-03-07 RX ORDER — HEPARIN SODIUM 5000 [USP'U]/ML
5000 INJECTION INTRAVENOUS; SUBCUTANEOUS ONCE
Refills: 0 | Status: COMPLETED | OUTPATIENT
Start: 2022-03-07 | End: 2022-03-07

## 2022-03-07 RX ORDER — OXYCODONE HYDROCHLORIDE 5 MG/1
10 TABLET ORAL ONCE
Refills: 0 | Status: DISCONTINUED | OUTPATIENT
Start: 2022-03-07 | End: 2022-03-07

## 2022-03-07 RX ORDER — OXYCODONE HYDROCHLORIDE 5 MG/1
5 TABLET ORAL EVERY 4 HOURS
Refills: 0 | Status: DISCONTINUED | OUTPATIENT
Start: 2022-03-07 | End: 2022-03-08

## 2022-03-07 RX ADMIN — Medication 2.5 MILLIGRAM(S): at 23:40

## 2022-03-07 RX ADMIN — HYDROMORPHONE HYDROCHLORIDE 0.5 MILLIGRAM(S): 2 INJECTION INTRAMUSCULAR; INTRAVENOUS; SUBCUTANEOUS at 19:18

## 2022-03-07 RX ADMIN — PANTOPRAZOLE SODIUM 40 MILLIGRAM(S): 20 TABLET, DELAYED RELEASE ORAL at 18:59

## 2022-03-07 RX ADMIN — HYDROMORPHONE HYDROCHLORIDE 0.5 MILLIGRAM(S): 2 INJECTION INTRAMUSCULAR; INTRAVENOUS; SUBCUTANEOUS at 19:50

## 2022-03-07 RX ADMIN — Medication 30 MILLIGRAM(S): at 21:53

## 2022-03-07 RX ADMIN — ENOXAPARIN SODIUM 40 MILLIGRAM(S): 100 INJECTION SUBCUTANEOUS at 23:36

## 2022-03-07 RX ADMIN — SODIUM CHLORIDE 150 MILLILITER(S): 9 INJECTION, SOLUTION INTRAVENOUS at 19:08

## 2022-03-07 RX ADMIN — HYDROMORPHONE HYDROCHLORIDE 0.5 MILLIGRAM(S): 2 INJECTION INTRAMUSCULAR; INTRAVENOUS; SUBCUTANEOUS at 19:09

## 2022-03-07 RX ADMIN — Medication 30 MILLIGRAM(S): at 21:26

## 2022-03-07 RX ADMIN — HYDROMORPHONE HYDROCHLORIDE 0.5 MILLIGRAM(S): 2 INJECTION INTRAMUSCULAR; INTRAVENOUS; SUBCUTANEOUS at 19:29

## 2022-03-07 RX ADMIN — HEPARIN SODIUM 5000 UNIT(S): 5000 INJECTION INTRAVENOUS; SUBCUTANEOUS at 12:41

## 2022-03-07 RX ADMIN — APREPITANT 80 MILLIGRAM(S): 80 CAPSULE ORAL at 12:42

## 2022-03-07 RX ADMIN — OXYCODONE HYDROCHLORIDE 10 MILLIGRAM(S): 5 TABLET ORAL at 23:34

## 2022-03-07 RX ADMIN — HYDROMORPHONE HYDROCHLORIDE 0.5 MILLIGRAM(S): 2 INJECTION INTRAMUSCULAR; INTRAVENOUS; SUBCUTANEOUS at 20:00

## 2022-03-07 RX ADMIN — HYDROMORPHONE HYDROCHLORIDE 0.5 MILLIGRAM(S): 2 INJECTION INTRAMUSCULAR; INTRAVENOUS; SUBCUTANEOUS at 19:28

## 2022-03-07 RX ADMIN — OXYCODONE HYDROCHLORIDE 10 MILLIGRAM(S): 5 TABLET ORAL at 19:50

## 2022-03-07 RX ADMIN — ONDANSETRON 4 MILLIGRAM(S): 8 TABLET, FILM COATED ORAL at 23:44

## 2022-03-07 RX ADMIN — Medication 400 MILLIGRAM(S): at 21:26

## 2022-03-07 RX ADMIN — Medication 1000 MILLIGRAM(S): at 21:53

## 2022-03-07 RX ADMIN — OXYCODONE HYDROCHLORIDE 10 MILLIGRAM(S): 5 TABLET ORAL at 20:00

## 2022-03-07 RX ADMIN — HYDROMORPHONE HYDROCHLORIDE 0.5 MILLIGRAM(S): 2 INJECTION INTRAMUSCULAR; INTRAVENOUS; SUBCUTANEOUS at 19:40

## 2022-03-07 NOTE — BRIEF OPERATIVE NOTE - OPERATION/FINDINGS
Patient underwent laparoscopic RNY gastric bypass (Jazmine 150cm & BP  100cm) without any complications. Intraoperative EGD confirmed intact GJ anastomosis that is easily transversed with negative leak test.

## 2022-03-07 NOTE — BRIEF OPERATIVE NOTE - NSICDXBRIEFPROCEDURE_GEN_ALL_CORE_FT
PROCEDURES:  Laparoscopic gastric bypass 07-Mar-2022 18:25:33  Krista Padron  Laparoscopic hiatal herniorrhaphy 07-Mar-2022 18:25:43  Krista Padron  Block, transversus abdominis plane, bilateral 07-Mar-2022 18:25:47  Krista Padron  EGD, intraoperative 07-Mar-2022 18:25:53  Krista Padron

## 2022-03-07 NOTE — PATIENT PROFILE ADULT - FALL HARM RISK - UNIVERSAL INTERVENTIONS
Bed in lowest position, wheels locked, appropriate side rails in place/Call bell, personal items and telephone in reach/Instruct patient to call for assistance before getting out of bed or chair/Non-slip footwear when patient is out of bed/[place] to call system/Physically safe environment - no spills, clutter or unnecessary equipment/Purposeful Proactive Rounding/Room/bathroom lighting operational, light cord in reach

## 2022-03-07 NOTE — BRIEF OPERATIVE NOTE - NSICDXBRIEFPOSTOP_GEN_ALL_CORE_FT
POST-OP DIAGNOSIS:  Morbid obesity 07-Mar-2022 18:26:25  Krista Padron  GERD (gastroesophageal reflux disease) 07-Mar-2022 18:26:16  Krista Padron

## 2022-03-07 NOTE — ASU PREOP CHECKLIST - SELECT TESTS ORDERED
HgA1C 5.4, BGM 96, albumin, carboxy/BMP/CBC/PT/PTT/INR/Type and Screen/Urinalysis/EKG/CXR/POCT Blood Glucose/COVID-19

## 2022-03-07 NOTE — BRIEF OPERATIVE NOTE - NSICDXBRIEFPREOP_GEN_ALL_CORE_FT
PRE-OP DIAGNOSIS:  GERD (gastroesophageal reflux disease) 07-Mar-2022 18:26:06  Krista Padron  Morbid obesity 07-Mar-2022 18:26:13  Krista Padron

## 2022-03-08 ENCOUNTER — TRANSCRIPTION ENCOUNTER (OUTPATIENT)
Age: 57
End: 2022-03-08

## 2022-03-08 VITALS
SYSTOLIC BLOOD PRESSURE: 116 MMHG | TEMPERATURE: 98 F | DIASTOLIC BLOOD PRESSURE: 47 MMHG | HEART RATE: 63 BPM | OXYGEN SATURATION: 96 % | RESPIRATION RATE: 16 BRPM

## 2022-03-08 DIAGNOSIS — E66.01 MORBID (SEVERE) OBESITY DUE TO EXCESS CALORIES: ICD-10-CM

## 2022-03-08 LAB
ANION GAP SERPL CALC-SCNC: 4 MMOL/L — LOW (ref 5–17)
BASOPHILS # BLD AUTO: 0.01 K/UL — SIGNIFICANT CHANGE UP (ref 0–0.2)
BASOPHILS NFR BLD AUTO: 0.1 % — SIGNIFICANT CHANGE UP (ref 0–2)
BUN SERPL-MCNC: 9 MG/DL — SIGNIFICANT CHANGE UP (ref 7–23)
CALCIUM SERPL-MCNC: 8.6 MG/DL — SIGNIFICANT CHANGE UP (ref 8.5–10.1)
CHLORIDE SERPL-SCNC: 112 MMOL/L — HIGH (ref 96–108)
CO2 SERPL-SCNC: 26 MMOL/L — SIGNIFICANT CHANGE UP (ref 22–31)
CREAT SERPL-MCNC: 0.82 MG/DL — SIGNIFICANT CHANGE UP (ref 0.5–1.3)
EGFR: 84 ML/MIN/1.73M2 — SIGNIFICANT CHANGE UP
EOSINOPHIL # BLD AUTO: 0 K/UL — SIGNIFICANT CHANGE UP (ref 0–0.5)
EOSINOPHIL NFR BLD AUTO: 0 % — SIGNIFICANT CHANGE UP (ref 0–6)
GLUCOSE SERPL-MCNC: 150 MG/DL — HIGH (ref 70–99)
HCT VFR BLD CALC: 34.9 % — SIGNIFICANT CHANGE UP (ref 34.5–45)
HGB BLD-MCNC: 11.5 G/DL — SIGNIFICANT CHANGE UP (ref 11.5–15.5)
IMM GRANULOCYTES NFR BLD AUTO: 0.5 % — SIGNIFICANT CHANGE UP (ref 0–1.5)
LYMPHOCYTES # BLD AUTO: 0.58 K/UL — LOW (ref 1–3.3)
LYMPHOCYTES # BLD AUTO: 4.2 % — LOW (ref 13–44)
MCHC RBC-ENTMCNC: 29.6 PG — SIGNIFICANT CHANGE UP (ref 27–34)
MCHC RBC-ENTMCNC: 33 GM/DL — SIGNIFICANT CHANGE UP (ref 32–36)
MCV RBC AUTO: 89.7 FL — SIGNIFICANT CHANGE UP (ref 80–100)
MONOCYTES # BLD AUTO: 0.72 K/UL — SIGNIFICANT CHANGE UP (ref 0–0.9)
MONOCYTES NFR BLD AUTO: 5.2 % — SIGNIFICANT CHANGE UP (ref 2–14)
NEUTROPHILS # BLD AUTO: 12.45 K/UL — HIGH (ref 1.8–7.4)
NEUTROPHILS NFR BLD AUTO: 90 % — HIGH (ref 43–77)
PLATELET # BLD AUTO: 180 K/UL — SIGNIFICANT CHANGE UP (ref 150–400)
POTASSIUM SERPL-MCNC: 5 MMOL/L — SIGNIFICANT CHANGE UP (ref 3.5–5.3)
POTASSIUM SERPL-SCNC: 5 MMOL/L — SIGNIFICANT CHANGE UP (ref 3.5–5.3)
RBC # BLD: 3.89 M/UL — SIGNIFICANT CHANGE UP (ref 3.8–5.2)
RBC # FLD: 13.1 % — SIGNIFICANT CHANGE UP (ref 10.3–14.5)
SODIUM SERPL-SCNC: 142 MMOL/L — SIGNIFICANT CHANGE UP (ref 135–145)
WBC # BLD: 13.83 K/UL — HIGH (ref 3.8–10.5)
WBC # FLD AUTO: 13.83 K/UL — HIGH (ref 3.8–10.5)

## 2022-03-08 PROCEDURE — 99252 IP/OBS CONSLTJ NEW/EST SF 35: CPT

## 2022-03-08 RX ORDER — ACETAMINOPHEN 500 MG
1000 TABLET ORAL ONCE
Refills: 0 | Status: COMPLETED | OUTPATIENT
Start: 2022-03-08 | End: 2022-03-08

## 2022-03-08 RX ORDER — ACETAMINOPHEN 500 MG
1000 TABLET ORAL ONCE
Refills: 0 | Status: DISCONTINUED | OUTPATIENT
Start: 2022-03-08 | End: 2022-03-08

## 2022-03-08 RX ADMIN — Medication 1000 MILLIGRAM(S): at 04:16

## 2022-03-08 RX ADMIN — OXYCODONE HYDROCHLORIDE 10 MILLIGRAM(S): 5 TABLET ORAL at 05:57

## 2022-03-08 RX ADMIN — Medication 30 MILLIGRAM(S): at 09:20

## 2022-03-08 RX ADMIN — OXYCODONE HYDROCHLORIDE 10 MILLIGRAM(S): 5 TABLET ORAL at 00:04

## 2022-03-08 RX ADMIN — OXYCODONE HYDROCHLORIDE 10 MILLIGRAM(S): 5 TABLET ORAL at 06:27

## 2022-03-08 RX ADMIN — Medication 400 MILLIGRAM(S): at 03:46

## 2022-03-08 RX ADMIN — Medication 400 MILLIGRAM(S): at 09:20

## 2022-03-08 RX ADMIN — ONDANSETRON 4 MILLIGRAM(S): 8 TABLET, FILM COATED ORAL at 05:59

## 2022-03-08 RX ADMIN — ENOXAPARIN SODIUM 40 MILLIGRAM(S): 100 INJECTION SUBCUTANEOUS at 09:20

## 2022-03-08 RX ADMIN — Medication 1000 MILLIGRAM(S): at 10:58

## 2022-03-08 RX ADMIN — Medication 30 MILLIGRAM(S): at 03:44

## 2022-03-08 RX ADMIN — Medication 30 MILLIGRAM(S): at 04:16

## 2022-03-08 NOTE — PROGRESS NOTE ADULT - SUBJECTIVE AND OBJECTIVE BOX
Post Op Day#: 1  Procedure:  Laparoscopic gastric RNY bypass    The patient is doing well without complaints.    Vital Signs Last 24 Hrs  T(C): 36.7 (08 Mar 2022 08:35), Max: 36.7 (08 Mar 2022 08:35)  T(F): 98 (08 Mar 2022 08:35), Max: 98 (08 Mar 2022 08:35)  HR: 63 (08 Mar 2022 08:35) (61 - 68)  BP: 116/47 (08 Mar 2022 08:35) (91/55 - 135/67)  BP(mean): 73 (07 Mar 2022 23:30) (64 - 73)  RR: 16 (08 Mar 2022 08:35) (11 - 18)  SpO2: 96% (08 Mar 2022 08:35) (94% - 100%)    PHYSICAL EXAM:  General: NAD.  HEENT: no JVD, no jaundice.  LUNGS: CTAB.  Heart: S1 S2 RRR  Abd: soft nt/nd   Wounds: clean dry and intact                          11.5   13.83 )-----------( 180      ( 08 Mar 2022 08:38 )             34.9       03-08    142  |  112<H>  |  9   ----------------------------<  150<H>  5.0   |  26  |  0.82    Ca    8.6      08 Mar 2022 08:38

## 2022-03-08 NOTE — DISCHARGE NOTE PROVIDER - HOSPITAL COURSE
Patient is a 57 yo F that underwent laparoscopic sleeve gastrectomy without any complications. Patient is currently doing very well, pain controlled, and is tolerating phase I bariatric diet. Patient has had uncomplicated hospital course and is stable for discharge home with follow-up in the office in 2 weeks.

## 2022-03-08 NOTE — DISCHARGE NOTE NURSING/CASE MANAGEMENT/SOCIAL WORK - NSDCPEFALRISK_GEN_ALL_CORE
For information on Fall & Injury Prevention, visit: [email or web address] OR  [email or web address] OR  https://www.cdc.gov/steadi/patient.html

## 2022-03-08 NOTE — DISCHARGE NOTE PROVIDER - NSDCMRMEDTOKEN_GEN_ALL_CORE_FT
Bystolic 10 mg oral tablet: 1 tab(s) orally once a day  Crestor 10 mg oral tablet: 1 tab(s) orally once a day  Lunesta 2 mg oral tablet: 1 tab(s) orally once a day (at bedtime)  Movantik 25 mg oral tablet: 0.5 tab(s) orally once a day (in the morning)  oxyCODONE 10 mg oral tablet: 1 tab(s) orally every 6 hours, As Needed  Sarafem 20 mg oral capsule: 1 cap(s) orally once a day  Vitamin D2 50,000 intl units (1.25 mg) oral capsule: 1 cap(s) orally once a week

## 2022-03-08 NOTE — CONSULT NOTE ADULT - SUBJECTIVE AND OBJECTIVE BOX
c/c: obesity    HPI: 56F, pmh of, HTN, HLD, Ahn's esophagus, endometriosis, Hepatic adenoma s/p resection, IBS, who is admitted for gastric bypass. Hospitalist service consulted for medical comanagement. Pt seen and examined this am. Doing well. Pain controlle.d Ambulating. No sob/chest pain. tolerating clears. no n/v.     ROS: all 10 systems reviewed and is as above otherwise negative.     PMH: as above  PSH:   Cystocele with rectocele 5/11/2017  Elective surgery removal of hepatic adenoma 2006  Hepatic adenoma resected 2006  History of rectal surgery 2013 interstimulator for rectal incontinence  later removed 2016  Rectovaginal fistula surgery - 07/2017  S/P appendectomy 1979  S/P breast augmentation reduction 2003  S/P exploratory laparotomy cholecystectomy, ROZINA, endometriosis  S/P hysterectomy with oophorectomy with mesh for stress incontinence  S/P LASIK surgery of both eyes 2000  S/P Nissen fundoplication (with gastrostomy tube placement) for barrets esophagus 2006  S/P ORIF (open reduction internal fixation) fracture left foot 2016  S/P tonsillectomy 1979  Stress incontinence, female right side of mesh "cut" adjusted  Tendon rupture, post-op repaired right elbow 2011  Traumatic rupture of plantar fascia of left foot, initial encounter surgery.     FAMILY HISTORY:  Father-MI, HTN  Mother-uterine ca/htn    SOCIAL: ETOH-occasional, Tobacco-10 pack year, quit 32 years ago.  ALLERGIES: cefotetan, cephalosporins , PCN, Sulfa  HOME MEDS: see med rec    Vital Signs Last 24 Hrs  T(C): 36.7 (08 Mar 2022 08:35), Max: 36.7 (08 Mar 2022 08:35)  T(F): 98 (08 Mar 2022 08:35), Max: 98 (08 Mar 2022 08:35)  HR: 63 (08 Mar 2022 08:35) (61 - 68)  BP: 116/47 (08 Mar 2022 08:35) (91/55 - 116/57)  BP(mean): 73 (07 Mar 2022 23:30) (64 - 73)  RR: 16 (08 Mar 2022 08:35) (11 - 16)  SpO2: 96% (08 Mar 2022 08:35) (94% - 100%)    PHYSICAL EXAM:    GENERAL: Comfortable, no acute distress   HEAD:  Normocephalic, atraumatic  EYES: EOMI, PERRLA  HEENT: Moist mucous membranes  NECK: Supple, No JVD  NERVOUS SYSTEM:  Alert & Oriented X3, Motor Strength 5/5 B/L upper and lower extremities  CHEST/LUNG: Clear to auscultation bilaterally  HEART: Regular rate and rhythm  ABDOMEN: Soft, Nontender, Nondistended, Bowel sounds present, lap sites intact with dermabond, ecchymosis at site of lovenox injn.  GENITOURINARY: Voiding, no palpable bladder  EXTREMITIES:   No clubbing, cyanosis, or edema  MUSCULOSKELETAL- No muscle tenderness, good rom.   SKIN-no rash    LABS:                        11.5   13.83 )-----------( 180      ( 08 Mar 2022 08:38 )             34.9     03-08    142  |  112<H>  |  9   ----------------------------<  150<H>  5.0   |  26  |  0.82    Ca    8.6      08 Mar 2022 08:38    MEDICATIONS  (STANDING):  acetaminophen   IVPB .. 1000 milliGRAM(s) IV Intermittent once  acetaminophen   IVPB .. 1000 milliGRAM(s) IV Intermittent once  enalaprilat Injectable 2.5 milliGRAM(s) IV Push every 6 hours  enoxaparin Injectable 40 milliGRAM(s) SubCutaneous every 12 hours  ketorolac   Injectable 30 milliGRAM(s) IV Push every 6 hours  lactated ringers. 1000 milliLiter(s) (150 mL/Hr) IV Continuous <Continuous>  ondansetron Injectable 4 milliGRAM(s) IV Push every 6 hours  pantoprazole  Injectable 40 milliGRAM(s) IV Push every 24 hours    MEDICATIONS  (PRN):  LORazepam   Injectable 0.5 milliGRAM(s) IV Push every 6 hours PRN Anxiety  oxyCODONE    Solution 5 milliGRAM(s) Oral every 4 hours PRN Mild Pain (1 - 3)  oxyCODONE    Solution 10 milliGRAM(s) Oral every 4 hours PRN Moderate Pain (4 - 6)    ASSESSMENT AND PLAN:  56f, PMH as above a/w:    1. Obesity s/p Gastric bypass:  -POD#1  -pain control  -ambulate  -adrián clears  -ivf  -ppi    2. Leukocytosis:  -d/t surgery   -no s/s of infn at this time.     3. HTN:-  -on iv enalapril  -on took bb yesterday  -on will resume today on dc.     4. DVT px  -lovenox.     thank you, will follow.

## 2022-03-08 NOTE — DISCHARGE NOTE NURSING/CASE MANAGEMENT/SOCIAL WORK - PATIENT PORTAL LINK FT
You can access the FollowMyHealth Patient Portal offered by [facility] by registering at the following website: [email or web address] By joining [employer]’s FollowMyHealth portal, you will also be able to view your health information using other applications (apps) compatible with our system.

## 2022-03-08 NOTE — PROGRESS NOTE ADULT - PROBLEM SELECTOR PLAN 1
The patient is s/p lap gastric bypass and doing very well.  All discharge instructions were given to the patient, as well as potential signs of complications.  The patient will follow up in 2 weeks.  [facility]

## 2022-03-08 NOTE — DISCHARGE NOTE PROVIDER - CARE PROVIDER_API CALL
Mark Horan)  Surgery  [address], [address]  [place] [address]  Phone: [phone]  Fax: [phone]  Follow Up Time: 2 weeks

## 2022-03-23 NOTE — ASU PATIENT PROFILE, ADULT - ALCOHOL USE HISTORY SINGLE SELECT
yes... Complex Repair Preamble Text (Leave Blank If You Do Not Want): Extensive wide undermining was performed.

## 2022-04-07 NOTE — CHART NOTE - NSCHARTNOTEFT_GEN_A_CORE
Operative Note    Patient: Karol Miranda  : Dec 8, 1965  Surgery date: 2022    Pre-Operative Diagnosis: Refractory morbid obesity with multiple comorbid conditions. Slipped Nissen, Hiatal Hernia    Post-operative Diagnosis: Same    Primary Procedure  [47613] Jazmine-En-Y Gastric Bypass - Laparoscopic     Secondary Procedure(s)  [04374] Uppr Gi Endoscopy, Diagnosis   [34828] Transversus Abdominis Plan (TAP) Block Bilateral   [50485] Hiatal Hernia Repair - Laparoscopic     Surgeon: Mark Horan M.D., FACS   Assistant surgeon: Pierre Ashby MD     Anesthesia type: General Anesthesia     ICD9 Code   Diagnosis   [E66.09]   OTHER OBESITY D/T EXCESS CALORIES (Stable)   [G47.33]   OBSTRUCTIVE SLEEP APNEA (Stable)   [I10]   ESSENTIAL PRIMARY HYPERTENSION (Stable)   [K22.7]   BARRETTS ESOPHAGUS (Stable)   [Z68.35]   BODY MASS INDEX 35.0-35.9 ADULT (Stable)     Estimated blood loss: 30 ml     DRAINS: none    COMPLICATIONS: none    INDICATIONS FOR THE PROCEDURE:  The patient is a 56-year old female who had a Nissen Fundoplication by Dr. Rockwell and was diagnosed with a slipped Nissen. The patient has significant gastroesophageal reflux and some dysphasia. . The patient had an upper endoscopy which showed the slipped Nissen. The patient also had signs of significant reflux with esophagitis and Ahn's esophagus. She was indicated for conversion of her Nissen Fundoplication to Jazmine-en-Y gastric bypass.     DESCRIPTION OF PROCEDURE: The patient was identified properly via a time-out. The patient was brought into the operating room and was placed on the operating room table in supine position. The patient was then given general endotracheal anesthesia and was given preoperative antibiotics. Preoperative heparin was given in the ambulatory surgery unit. The patient was then prepped and draped in the usual sterile fashion. An Exparel mixture was mixed at the back table with 20 mL of Exparel, 30 mL of 0.25% Marcaine and 150 mL of normal saline. A Veress needle was placed in the left upper quadrant. The abdomen was insufflated to 15 millimeters of mercury. Once the abdomen was insufflated, the Exparel mixture was given subcutaneously at the sites of incision. An incision was made within the umbilicus and a 12-millimeter trocar was placed in the abdomen. The laparoscope was inserted and there was no injury on initial trocar placement or initial Veress needle placement. A Transversus Abdominus Plane Block was then conducted by placing 20 mL of the Exparel mixture preperitoneal at the distribution of the spinal nerves on the lateral abdominal wall. This was started at the costal margin at the mid axillary line. 20cc of the Exparel mixture was placed in this area. Another 20 mL was placed four centimeters caudal to this area. Another 20 mL was placed four centimeters caudal to this area and another 15 mL was placed four centimeters caudal to this area. This was repeated on the opposite side of the body in a similar fashion. The rest of the Exparel was placed into the subcutaneous tissues and preperitoneal at every trocar site. Under direct vision, the 12 mm trocar was placed in the right upper quadrant and mid clavicular line and a 5 mm trocar was placed in the right upper quadrant in the anterior axillary line. A 12 mm and 5 mm trocar was placed in the left upper quadrant. A 5 mm subxiphoid incision was then made and through this the Douglas liver retractor was inserted and was held in place using the medi-flex device. The patient was then placed into steep reverse Trendelenburg position. The ligament of Treitz was identified after retracting the colon and greater omentum superiorly. The small bowel was measured for 100 cm from the ligament of Treitz and divided using a 60 mm I drive stapler using the tan load. The Jazmine limb was then measured at this point for 150 cm. The biliopancreatic limb and the Jazmine limb were then approximated using two 2-0 Surgidac sutures using the Endostitch device. Enterotomies were made in both these limbs. A single firing of the 60 mm tan load was then used to create a functional side to side anastomosis. The enterotomies sites were closed using a running 3-0 absorbable Vlock suture using the Endostitch device. On completion of the anastomosis, the mesenteric defects between the small bowel mesentery were closed with the 2-0 VLOC suture using the Endostitch device. The greater omentum was then divided longitudinally to create space for the Jazmine limb to come into the anticolic and antigastric position. A moderate sized hiatal hernia was identified. The stomach is gently retracted into the abdomen to assess its degree of tethering in the thorax. The peritoneum overlying the right jose a is incised, and the plane along the hernia sac is developed. The dissection is extended anteriorly and laterally to the left jose a. The base of the crural confluence is dissected free of adhesions. The hernia sac is carefully dissected into the mediastinum with caudal traction. The esophagus is identified and dissected circumferentially and along its mediastinal course in order to reduce tension, allowing the gastroesophageal junction to rest comfortably within the abdominal cavity. The crural pillars are then approximated with 1 2-0 VLOC sutures posteriorly. The tightness of the repair is gauged visually. At this time, the upper part of the stomach was dissected and the Nissen fundoplication sutures were removed and cut.  The allowed the fundus to go back to its original position.  The stomach was then transected 3 cm below the GE junction With a 60 mm purple load stapler.  The stomach was then cut vertically with 2 more fires of the 60 mm purple load stapler.  The fundus was then cut to remove the ischemic portion of the fundus.  The fundus was cut using another two firings of the 60 mm purple load stapler. The Jazmine limb and the gastric pouch were then approximated using 3-0 VLOC suture using the Endostitch device. Enterotomies measuring approximately 1 cm were made in both the gastric pouch and the Jazmine limb. A completely hand sewn anastomosis was then performed using an inner layer of running Vicryl suture and we then performed an outer layer of running 3-0 VLOC suture in a Lembert fashion. After completion of the anastomosis, the Jazmine limb was clamped and in intraoperative endoscopy was performed. It was found that there was no bleeding or stricture at the anastomosis, and after pumping air and submerging the anastomosis underwater no leak was found. Cedillo's defect was then closed with a 2-0 VLOC Hemostasis was assured. The abdominal cavity was irrigated and suctioned. Satisfied with the surgery at this point, the liver retractor was removed under direct vision. The remaining trocars were then removed. The skin was then closed with running Monocryl sutures and dermabond was applied over that. The patient tolerated the procedure well. The patient was extubated in the operating room.    Disposition      To recovery room, VS stable
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
## 2022-07-26 ENCOUNTER — TRANSCRIPTION ENCOUNTER (OUTPATIENT)
Age: 57
End: 2022-07-26

## 2022-07-26 DIAGNOSIS — N39.0 URINARY TRACT INFECTION, SITE NOT SPECIFIED: ICD-10-CM

## 2022-08-17 ENCOUNTER — NON-APPOINTMENT (OUTPATIENT)
Age: 57
End: 2022-08-17

## 2022-08-17 DIAGNOSIS — Z86.2 PERSONAL HISTORY OF DISEASES OF THE BLOOD AND BLOOD-FORMING ORGANS AND CERTAIN DISORDERS INVOLVING THE IMMUNE MECHANISM: ICD-10-CM

## 2022-08-17 DIAGNOSIS — Z98.890 OTHER SPECIFIED POSTPROCEDURAL STATES: ICD-10-CM

## 2022-08-17 DIAGNOSIS — Z86.39 PERSONAL HISTORY OF OTHER ENDOCRINE, NUTRITIONAL AND METABOLIC DISEASE: ICD-10-CM

## 2022-08-17 DIAGNOSIS — Z92.89 PERSONAL HISTORY OF OTHER MEDICAL TREATMENT: ICD-10-CM

## 2022-08-17 DIAGNOSIS — Z78.0 ASYMPTOMATIC MENOPAUSAL STATE: ICD-10-CM

## 2022-08-17 DIAGNOSIS — Z86.018 PERSONAL HISTORY OF OTHER BENIGN NEOPLASM: ICD-10-CM

## 2022-08-17 DIAGNOSIS — Z78.9 OTHER SPECIFIED HEALTH STATUS: ICD-10-CM

## 2022-08-17 DIAGNOSIS — Z92.29 PERSONAL HISTORY OF OTHER DRUG THERAPY: ICD-10-CM

## 2022-08-17 DIAGNOSIS — N82.3 FISTULA OF VAGINA TO LARGE INTESTINE: ICD-10-CM

## 2022-09-06 NOTE — ED PROVIDER NOTE - PSYCHIATRIC, MLM
Detail Level: Detailed Detail Level: Generalized Detail Level: Simple Alert and oriented to person, place, time/situation. normal mood and affect. no apparent risk to self or others.

## 2022-11-02 ENCOUNTER — RESULT CHARGE (OUTPATIENT)
Age: 57
End: 2022-11-02

## 2022-11-02 ENCOUNTER — NON-APPOINTMENT (OUTPATIENT)
Age: 57
End: 2022-11-02

## 2022-11-02 ENCOUNTER — APPOINTMENT (OUTPATIENT)
Dept: OBGYN | Facility: CLINIC | Age: 57
End: 2022-11-02

## 2022-11-02 VITALS
BODY MASS INDEX: 22.45 KG/M2 | HEART RATE: 69 BPM | SYSTOLIC BLOOD PRESSURE: 105 MMHG | HEIGHT: 62 IN | DIASTOLIC BLOOD PRESSURE: 68 MMHG | WEIGHT: 122 LBS

## 2022-11-02 DIAGNOSIS — R92.2 INCONCLUSIVE MAMMOGRAM: ICD-10-CM

## 2022-11-02 DIAGNOSIS — N39.0 URINARY TRACT INFECTION, SITE NOT SPECIFIED: ICD-10-CM

## 2022-11-02 DIAGNOSIS — N95.2 POSTMENOPAUSAL ATROPHIC VAGINITIS: ICD-10-CM

## 2022-11-02 LAB
BILIRUB UR QL STRIP: NORMAL
CLARITY UR: CLEAR
COLLECTION METHOD: NORMAL
GLUCOSE UR-MCNC: NORMAL
HCG UR QL: 0.2 EU/DL
HEMOGLOBIN: 12.4
HGB UR QL STRIP.AUTO: NORMAL
KETONES UR-MCNC: NORMAL
LEUKOCYTE ESTERASE UR QL STRIP: NORMAL
NITRITE UR QL STRIP: NORMAL
PH UR STRIP: 5.5
PROT UR STRIP-MCNC: 30
SP GR UR STRIP: 1.03

## 2022-11-02 PROCEDURE — 85018 HEMOGLOBIN: CPT | Mod: QW

## 2022-11-02 PROCEDURE — 99396 PREV VISIT EST AGE 40-64: CPT

## 2022-11-02 PROCEDURE — 81003 URINALYSIS AUTO W/O SCOPE: CPT | Mod: QW

## 2022-11-02 PROCEDURE — 82270 OCCULT BLOOD FECES: CPT

## 2022-11-03 NOTE — HISTORY OF PRESENT ILLNESS
[TextBox_4] : Patient is a 55y/o female here today for annual  visit. She is s/p Martius flap done in 2017. She denies any GYN complaints at this time. \par

## 2022-11-03 NOTE — PHYSICAL EXAM
[Chaperone Present] : A chaperone was present in the examining room during all aspects of the physical examination [Appropriately responsive] : appropriately responsive [No Lymphadenopathy] : no lymphadenopathy [Soft] : soft [Non-tender] : non-tender [Oriented x3] : oriented x3 [Examination Of The Breasts] : a normal appearance [No Discharge] : no discharge [No Masses] : no breast masses were palpable [Labia Majora] : normal [Labia Minora] : normal [Atrophy] : atrophy [Normal] : normal [Absent] : absent [Uterine Adnexae] : normal [FreeTextEntry1] : Ivy VILLEGAS-MACY chaperoned during entire physical exam [Occult Blood Positive] : was negative for occult blood analysis [FreeTextEntry4] : moderate atrophy

## 2022-11-03 NOTE — PLAN
[FreeTextEntry1] : \par Patient to follow up in 1 year for annual GYN exam\par Mammogram and bilateral breast US due: now Rx given \par Colonoscopy due:  per Alok \par Bone density due: now Rx given \par Pap ordered\par \par Hemoccult ordered \par All questions answered, patient agreeable with plan.\par \par Written by Ivy CARDOZO, acting as a scribe for Dr. Mazariegos. This note accurately reflects the work and decisions made by me.\par

## 2022-11-04 ENCOUNTER — OUTPATIENT (OUTPATIENT)
Dept: OUTPATIENT SERVICES | Facility: HOSPITAL | Age: 57
LOS: 1 days | Discharge: HOME | End: 2022-11-04
Payer: COMMERCIAL

## 2022-11-04 VITALS
HEIGHT: 62 IN | OXYGEN SATURATION: 100 % | DIASTOLIC BLOOD PRESSURE: 68 MMHG | SYSTOLIC BLOOD PRESSURE: 146 MMHG | TEMPERATURE: 98 F | HEART RATE: 59 BPM | RESPIRATION RATE: 15 BRPM | WEIGHT: 121.92 LBS

## 2022-11-04 DIAGNOSIS — S96.812A STRAIN OF OTHER SPECIFIED MUSCLES AND TENDONS AT ANKLE AND FOOT LEVEL, LEFT FOOT, INITIAL ENCOUNTER: Chronic | ICD-10-CM

## 2022-11-04 DIAGNOSIS — Z98.890 OTHER SPECIFIED POSTPROCEDURAL STATES: Chronic | ICD-10-CM

## 2022-11-04 DIAGNOSIS — Z41.9 ENCOUNTER FOR PROCEDURE FOR PURPOSES OTHER THAN REMEDYING HEALTH STATE, UNSPECIFIED: Chronic | ICD-10-CM

## 2022-11-04 DIAGNOSIS — E66.01 MORBID (SEVERE) OBESITY DUE TO EXCESS CALORIES: ICD-10-CM

## 2022-11-04 DIAGNOSIS — Z96.7 PRESENCE OF OTHER BONE AND TENDON IMPLANTS: Chronic | ICD-10-CM

## 2022-11-04 DIAGNOSIS — K21.9 GASTRO-ESOPHAGEAL REFLUX DISEASE WITHOUT ESOPHAGITIS: ICD-10-CM

## 2022-11-04 DIAGNOSIS — N82.3 FISTULA OF VAGINA TO LARGE INTESTINE: Chronic | ICD-10-CM

## 2022-11-04 DIAGNOSIS — N81.10 CYSTOCELE, UNSPECIFIED: Chronic | ICD-10-CM

## 2022-11-04 PROCEDURE — C1726: CPT

## 2022-11-04 NOTE — ASU PATIENT PROFILE, ADULT - LEARNING ASSESSMENT (PATIENT) ADDITIONAL COMMENTS
does not need anyone present at d/c

## 2022-11-07 ENCOUNTER — APPOINTMENT (OUTPATIENT)
Dept: GASTROENTEROLOGY | Facility: HOSPITAL | Age: 57
End: 2022-11-07

## 2022-11-07 ENCOUNTER — OUTPATIENT (OUTPATIENT)
Dept: INPATIENT UNIT | Facility: HOSPITAL | Age: 57
LOS: 1 days | Discharge: HOME | End: 2022-11-07
Payer: COMMERCIAL

## 2022-11-07 ENCOUNTER — TRANSCRIPTION ENCOUNTER (OUTPATIENT)
Age: 57
End: 2022-11-07

## 2022-11-07 VITALS
TEMPERATURE: 98 F | SYSTOLIC BLOOD PRESSURE: 119 MMHG | DIASTOLIC BLOOD PRESSURE: 59 MMHG | OXYGEN SATURATION: 100 % | RESPIRATION RATE: 16 BRPM | HEART RATE: 65 BPM

## 2022-11-07 VITALS
SYSTOLIC BLOOD PRESSURE: 104 MMHG | HEIGHT: 62 IN | WEIGHT: 121.03 LBS | RESPIRATION RATE: 16 BRPM | HEART RATE: 63 BPM | TEMPERATURE: 98 F | DIASTOLIC BLOOD PRESSURE: 76 MMHG | OXYGEN SATURATION: 99 %

## 2022-11-07 DIAGNOSIS — N81.10 CYSTOCELE, UNSPECIFIED: Chronic | ICD-10-CM

## 2022-11-07 DIAGNOSIS — Z41.9 ENCOUNTER FOR PROCEDURE FOR PURPOSES OTHER THAN REMEDYING HEALTH STATE, UNSPECIFIED: Chronic | ICD-10-CM

## 2022-11-07 DIAGNOSIS — Z96.7 PRESENCE OF OTHER BONE AND TENDON IMPLANTS: Chronic | ICD-10-CM

## 2022-11-07 DIAGNOSIS — Z98.890 OTHER SPECIFIED POSTPROCEDURAL STATES: Chronic | ICD-10-CM

## 2022-11-07 DIAGNOSIS — S96.812A STRAIN OF OTHER SPECIFIED MUSCLES AND TENDONS AT ANKLE AND FOOT LEVEL, LEFT FOOT, INITIAL ENCOUNTER: Chronic | ICD-10-CM

## 2022-11-07 DIAGNOSIS — N82.3 FISTULA OF VAGINA TO LARGE INTESTINE: Chronic | ICD-10-CM

## 2022-11-07 DIAGNOSIS — K22.2 ESOPHAGEAL OBSTRUCTION: ICD-10-CM

## 2022-11-07 LAB
ANION GAP SERPL CALC-SCNC: 11 MMOL/L
APTT BLD: 36 SEC — HIGH (ref 27.5–35.5)
BASOPHILS # BLD AUTO: 0.06 K/UL
BASOPHILS NFR BLD AUTO: 1.1 %
BUN SERPL-MCNC: 8 MG/DL
CALCIUM SERPL-MCNC: 9.9 MG/DL
CHLORIDE SERPL-SCNC: 104 MMOL/L
CO2 SERPL-SCNC: 27 MMOL/L
CREAT SERPL-MCNC: 0.73 MG/DL
EGFR: 97 ML/MIN/1.73M2
EOSINOPHIL # BLD AUTO: 0.14 K/UL
EOSINOPHIL NFR BLD AUTO: 2.5 %
GLUCOSE SERPL-MCNC: 133 MG/DL
HCT VFR BLD CALC: 42.1 %
HGB BLD-MCNC: 13.7 G/DL
IMM GRANULOCYTES NFR BLD AUTO: 0.4 %
INR BLD: 1.04 RATIO — SIGNIFICANT CHANGE UP (ref 0.88–1.16)
LYMPHOCYTES # BLD AUTO: 1.89 K/UL
LYMPHOCYTES NFR BLD AUTO: 33.9 %
MAN DIFF?: NORMAL
MCHC RBC-ENTMCNC: 29.1 PG
MCHC RBC-ENTMCNC: 32.5 GM/DL
MCV RBC AUTO: 89.4 FL
MONOCYTES # BLD AUTO: 0.37 K/UL
MONOCYTES NFR BLD AUTO: 6.6 %
NEUTROPHILS # BLD AUTO: 3.1 K/UL
NEUTROPHILS NFR BLD AUTO: 55.5 %
PLATELET # BLD AUTO: 228 K/UL
POTASSIUM SERPL-SCNC: 4.5 MMOL/L
PROTHROM AB SERPL-ACNC: 12.1 SEC — SIGNIFICANT CHANGE UP (ref 10.5–13.4)
RBC # BLD: 4.71 M/UL
RBC # FLD: 12.5 %
SARS-COV-2 N GENE NPH QL NAA+PROBE: NOT DETECTED
SODIUM SERPL-SCNC: 143 MMOL/L
WBC # FLD AUTO: 5.58 K/UL

## 2022-11-07 PROCEDURE — C1769: CPT

## 2022-11-07 PROCEDURE — C1889: CPT

## 2022-11-07 PROCEDURE — 85610 PROTHROMBIN TIME: CPT

## 2022-11-07 PROCEDURE — 93005 ELECTROCARDIOGRAM TRACING: CPT

## 2022-11-07 PROCEDURE — 85730 THROMBOPLASTIN TIME PARTIAL: CPT

## 2022-11-07 PROCEDURE — C1874: CPT

## 2022-11-07 PROCEDURE — 43266 EGD ENDOSCOPIC STENT PLACE: CPT | Mod: GC

## 2022-11-07 PROCEDURE — 36415 COLL VENOUS BLD VENIPUNCTURE: CPT

## 2022-11-07 PROCEDURE — 93010 ELECTROCARDIOGRAM REPORT: CPT

## 2022-11-07 NOTE — ASU PATIENT PROFILE, ADULT - NSICDXPASTSURGICALHX_GEN_ALL_CORE_FT
PAST SURGICAL HISTORY:  Cystocele with rectocele 5/11/2017    Elective surgery removal of hepatic adenoma 2006    Hepatic adenoma resected 2006    History of bilateral breast reduction surgery     History of rectal surgery 2013 interstimulator for rectal incontinence  later removed 2016    Rectovaginal fistula surgery - 07/2017    S/P appendectomy 1979    S/P colonoscopy multiple    S/P exploratory laparotomy cholecystectomy, ROZINA, endometriosis    S/P hysterectomy with oophorectomy with mesh for stress incontinence    S/P LASIK surgery of both eyes 2000    S/P Nissen fundoplication (with gastrostomy tube placement) for barrets esophagus 2006    S/P ORIF (open reduction internal fixation) fracture left foot 2016    S/P tonsillectomy 1979    Stress incontinence, female right side of mesh "cut" adjusted    Tendon rupture, post-op repaired right elbow 2011    Traumatic rupture of plantar fascia of left foot, initial encounter surgery

## 2022-11-07 NOTE — ASU DISCHARGE PLAN (ADULT/PEDIATRIC) - NS MD DC FALL RISK RISK
For information on Fall & Injury Prevention, visit: [email or web address] OR  [email or web address] OR  https://www.cdc.gov/steadi/patient.html

## 2022-11-07 NOTE — ASU PATIENT PROFILE, ADULT - FALL HARM RISK - UNIVERSAL INTERVENTIONS
Bed in lowest position, wheels locked, appropriate side rails in place/Call bell, personal items and telephone in reach/Instruct patient to call for assistance before getting out of bed or chair/Non-slip footwear when patient is out of bed/[place] to call system/Physically safe environment - no spills, clutter or unnecessary equipment/Purposeful Proactive Rounding/Room/bathroom lighting operational, light cord in reach

## 2022-11-07 NOTE — ASU PREOP CHECKLIST - TEMPERATURE IN CELSIUS (DEGREES C)
[FreeTextEntry1] : 81 yo female with focal seizures presenting as difficulty speaking with R sided numbness 2/2 meningioma resected in Aug 2014 well controlled on keppra 750mg bid and vimpat 100mg bid; no further seizures since Sept 2014.\par \par New left temporal enhancing lesion on MRI in 2017, found to not be present in further imaging.\par \par 1) Focal seizures\par C/w keppra 750mg bid, vimpat 100mg bid\par Does not want to change medication at this time\par Will rediscuss at next visit.\par \par 2) Meningioma\par Discussed MRI and patient/ do not want to get - explained risks/benefits and they understand risks of not doing study, but her anxiety from the test would be severe - agreed to testing if any changes in exam or symptoms.\par Patient knows to contact if any changes.\par \par \par Side effects of medication discussed in detail.\par \par total time 40 min 36.7

## 2022-11-08 LAB — CYTOLOGY CVX/VAG DOC THIN PREP: NORMAL

## 2022-11-09 DIAGNOSIS — Z98.84 BARIATRIC SURGERY STATUS: ICD-10-CM

## 2022-11-09 DIAGNOSIS — I10 ESSENTIAL (PRIMARY) HYPERTENSION: ICD-10-CM

## 2022-11-09 DIAGNOSIS — K31.89 OTHER DISEASES OF STOMACH AND DUODENUM: ICD-10-CM

## 2022-11-09 DIAGNOSIS — R13.10 DYSPHAGIA, UNSPECIFIED: ICD-10-CM

## 2022-11-09 DIAGNOSIS — Z88.2 ALLERGY STATUS TO SULFONAMIDES: ICD-10-CM

## 2022-11-09 DIAGNOSIS — Z88.1 ALLERGY STATUS TO OTHER ANTIBIOTIC AGENTS STATUS: ICD-10-CM

## 2022-11-09 DIAGNOSIS — Z88.0 ALLERGY STATUS TO PENICILLIN: ICD-10-CM

## 2022-11-10 DIAGNOSIS — Z98.84 BARIATRIC SURGERY STATUS: ICD-10-CM

## 2022-11-10 DIAGNOSIS — Z88.0 ALLERGY STATUS TO PENICILLIN: ICD-10-CM

## 2022-11-10 DIAGNOSIS — K95.89 OTHER COMPLICATIONS OF OTHER BARIATRIC PROCEDURE: ICD-10-CM

## 2022-11-10 DIAGNOSIS — Z88.2 ALLERGY STATUS TO SULFONAMIDES: ICD-10-CM

## 2022-11-10 DIAGNOSIS — E78.00 PURE HYPERCHOLESTEROLEMIA, UNSPECIFIED: ICD-10-CM

## 2022-11-10 DIAGNOSIS — I10 ESSENTIAL (PRIMARY) HYPERTENSION: ICD-10-CM

## 2022-11-17 ENCOUNTER — EMERGENCY (EMERGENCY)
Facility: HOSPITAL | Age: 57
LOS: 0 days | Discharge: HOME | End: 2022-11-17
Attending: EMERGENCY MEDICINE
Payer: COMMERCIAL

## 2022-11-17 VITALS — WEIGHT: 125 LBS | HEIGHT: 62 IN

## 2022-11-17 VITALS
TEMPERATURE: 98 F | HEART RATE: 63 BPM | DIASTOLIC BLOOD PRESSURE: 62 MMHG | OXYGEN SATURATION: 98 % | RESPIRATION RATE: 17 BRPM | SYSTOLIC BLOOD PRESSURE: 114 MMHG

## 2022-11-17 DIAGNOSIS — K22.70 BARRETT'S ESOPHAGUS WITHOUT DYSPLASIA: ICD-10-CM

## 2022-11-17 DIAGNOSIS — K58.1 IRRITABLE BOWEL SYNDROME WITH CONSTIPATION: ICD-10-CM

## 2022-11-17 DIAGNOSIS — Z88.2 ALLERGY STATUS TO SULFONAMIDES: ICD-10-CM

## 2022-11-17 DIAGNOSIS — E78.5 HYPERLIPIDEMIA, UNSPECIFIED: ICD-10-CM

## 2022-11-17 DIAGNOSIS — S96.812A STRAIN OF OTHER SPECIFIED MUSCLES AND TENDONS AT ANKLE AND FOOT LEVEL, LEFT FOOT, INITIAL ENCOUNTER: Chronic | ICD-10-CM

## 2022-11-17 DIAGNOSIS — Z98.890 OTHER SPECIFIED POSTPROCEDURAL STATES: Chronic | ICD-10-CM

## 2022-11-17 DIAGNOSIS — Z90.710 ACQUIRED ABSENCE OF BOTH CERVIX AND UTERUS: ICD-10-CM

## 2022-11-17 DIAGNOSIS — Z88.1 ALLERGY STATUS TO OTHER ANTIBIOTIC AGENTS STATUS: ICD-10-CM

## 2022-11-17 DIAGNOSIS — I70.90 UNSPECIFIED ATHEROSCLEROSIS: ICD-10-CM

## 2022-11-17 DIAGNOSIS — N80.9 ENDOMETRIOSIS, UNSPECIFIED: ICD-10-CM

## 2022-11-17 DIAGNOSIS — K59.00 CONSTIPATION, UNSPECIFIED: ICD-10-CM

## 2022-11-17 DIAGNOSIS — Z20.822 CONTACT WITH AND (SUSPECTED) EXPOSURE TO COVID-19: ICD-10-CM

## 2022-11-17 DIAGNOSIS — Z90.89 ACQUIRED ABSENCE OF OTHER ORGANS: ICD-10-CM

## 2022-11-17 DIAGNOSIS — Z98.84 BARIATRIC SURGERY STATUS: ICD-10-CM

## 2022-11-17 DIAGNOSIS — R10.9 UNSPECIFIED ABDOMINAL PAIN: ICD-10-CM

## 2022-11-17 DIAGNOSIS — Z96.7 PRESENCE OF OTHER BONE AND TENDON IMPLANTS: Chronic | ICD-10-CM

## 2022-11-17 DIAGNOSIS — N82.3 FISTULA OF VAGINA TO LARGE INTESTINE: Chronic | ICD-10-CM

## 2022-11-17 DIAGNOSIS — Z85.05 PERSONAL HISTORY OF MALIGNANT NEOPLASM OF LIVER: ICD-10-CM

## 2022-11-17 DIAGNOSIS — R14.3 FLATULENCE: ICD-10-CM

## 2022-11-17 DIAGNOSIS — Z90.49 ACQUIRED ABSENCE OF OTHER SPECIFIED PARTS OF DIGESTIVE TRACT: ICD-10-CM

## 2022-11-17 DIAGNOSIS — R10.13 EPIGASTRIC PAIN: ICD-10-CM

## 2022-11-17 DIAGNOSIS — M47.9 SPONDYLOSIS, UNSPECIFIED: ICD-10-CM

## 2022-11-17 DIAGNOSIS — Z98.890 OTHER SPECIFIED POSTPROCEDURAL STATES: ICD-10-CM

## 2022-11-17 DIAGNOSIS — Z88.0 ALLERGY STATUS TO PENICILLIN: ICD-10-CM

## 2022-11-17 DIAGNOSIS — N81.10 CYSTOCELE, UNSPECIFIED: Chronic | ICD-10-CM

## 2022-11-17 DIAGNOSIS — I10 ESSENTIAL (PRIMARY) HYPERTENSION: ICD-10-CM

## 2022-11-17 DIAGNOSIS — K64.9 UNSPECIFIED HEMORRHOIDS: ICD-10-CM

## 2022-11-17 DIAGNOSIS — Z41.9 ENCOUNTER FOR PROCEDURE FOR PURPOSES OTHER THAN REMEDYING HEALTH STATE, UNSPECIFIED: Chronic | ICD-10-CM

## 2022-11-17 LAB
ALBUMIN SERPL ELPH-MCNC: 3.2 G/DL — LOW (ref 3.3–5)
ALP SERPL-CCNC: 98 U/L — SIGNIFICANT CHANGE UP (ref 40–120)
ALT FLD-CCNC: 21 U/L — SIGNIFICANT CHANGE UP (ref 12–78)
ANION GAP SERPL CALC-SCNC: 5 MMOL/L — SIGNIFICANT CHANGE UP (ref 5–17)
APPEARANCE UR: CLEAR — SIGNIFICANT CHANGE UP
APTT BLD: 35.7 SEC — HIGH (ref 27.5–35.5)
AST SERPL-CCNC: 21 U/L — SIGNIFICANT CHANGE UP (ref 15–37)
BASOPHILS # BLD AUTO: 0.05 K/UL — SIGNIFICANT CHANGE UP (ref 0–0.2)
BASOPHILS NFR BLD AUTO: 0.7 % — SIGNIFICANT CHANGE UP (ref 0–2)
BILIRUB SERPL-MCNC: 0.2 MG/DL — SIGNIFICANT CHANGE UP (ref 0.2–1.2)
BILIRUB UR-MCNC: NEGATIVE — SIGNIFICANT CHANGE UP
BUN SERPL-MCNC: 9 MG/DL — SIGNIFICANT CHANGE UP (ref 7–23)
CALCIUM SERPL-MCNC: 8.8 MG/DL — SIGNIFICANT CHANGE UP (ref 8.5–10.1)
CHLORIDE SERPL-SCNC: 107 MMOL/L — SIGNIFICANT CHANGE UP (ref 96–108)
CO2 SERPL-SCNC: 29 MMOL/L — SIGNIFICANT CHANGE UP (ref 22–31)
COLOR SPEC: YELLOW — SIGNIFICANT CHANGE UP
CREAT SERPL-MCNC: 0.65 MG/DL — SIGNIFICANT CHANGE UP (ref 0.5–1.3)
DIFF PNL FLD: NEGATIVE — SIGNIFICANT CHANGE UP
EGFR: 104 ML/MIN/1.73M2 — SIGNIFICANT CHANGE UP
EOSINOPHIL # BLD AUTO: 0.21 K/UL — SIGNIFICANT CHANGE UP (ref 0–0.5)
EOSINOPHIL NFR BLD AUTO: 3.1 % — SIGNIFICANT CHANGE UP (ref 0–6)
GLUCOSE SERPL-MCNC: 85 MG/DL — SIGNIFICANT CHANGE UP (ref 70–99)
GLUCOSE UR QL: NEGATIVE — SIGNIFICANT CHANGE UP
HCT VFR BLD CALC: 39.5 % — SIGNIFICANT CHANGE UP (ref 34.5–45)
HGB BLD-MCNC: 13.1 G/DL — SIGNIFICANT CHANGE UP (ref 11.5–15.5)
IMM GRANULOCYTES NFR BLD AUTO: 0.1 % — SIGNIFICANT CHANGE UP (ref 0–0.9)
INR BLD: 1.04 RATIO — SIGNIFICANT CHANGE UP (ref 0.88–1.16)
KETONES UR-MCNC: NEGATIVE — SIGNIFICANT CHANGE UP
LEUKOCYTE ESTERASE UR-ACNC: ABNORMAL
LIDOCAIN IGE QN: 74 U/L — SIGNIFICANT CHANGE UP (ref 73–393)
LYMPHOCYTES # BLD AUTO: 2.18 K/UL — SIGNIFICANT CHANGE UP (ref 1–3.3)
LYMPHOCYTES # BLD AUTO: 31.8 % — SIGNIFICANT CHANGE UP (ref 13–44)
MCHC RBC-ENTMCNC: 29.5 PG — SIGNIFICANT CHANGE UP (ref 27–34)
MCHC RBC-ENTMCNC: 33.2 GM/DL — SIGNIFICANT CHANGE UP (ref 32–36)
MCV RBC AUTO: 89 FL — SIGNIFICANT CHANGE UP (ref 80–100)
MONOCYTES # BLD AUTO: 0.47 K/UL — SIGNIFICANT CHANGE UP (ref 0–0.9)
MONOCYTES NFR BLD AUTO: 6.9 % — SIGNIFICANT CHANGE UP (ref 2–14)
NEUTROPHILS # BLD AUTO: 3.94 K/UL — SIGNIFICANT CHANGE UP (ref 1.8–7.4)
NEUTROPHILS NFR BLD AUTO: 57.4 % — SIGNIFICANT CHANGE UP (ref 43–77)
NITRITE UR-MCNC: NEGATIVE — SIGNIFICANT CHANGE UP
PH UR: 5 — SIGNIFICANT CHANGE UP (ref 5–8)
PLATELET # BLD AUTO: 255 K/UL — SIGNIFICANT CHANGE UP (ref 150–400)
POTASSIUM SERPL-MCNC: 3.5 MMOL/L — SIGNIFICANT CHANGE UP (ref 3.5–5.3)
POTASSIUM SERPL-SCNC: 3.5 MMOL/L — SIGNIFICANT CHANGE UP (ref 3.5–5.3)
PROT SERPL-MCNC: 7 GM/DL — SIGNIFICANT CHANGE UP (ref 6–8.3)
PROT UR-MCNC: NEGATIVE — SIGNIFICANT CHANGE UP
PROTHROM AB SERPL-ACNC: 12.1 SEC — SIGNIFICANT CHANGE UP (ref 10.5–13.4)
RBC # BLD: 4.44 M/UL — SIGNIFICANT CHANGE UP (ref 3.8–5.2)
RBC # FLD: 12.7 % — SIGNIFICANT CHANGE UP (ref 10.3–14.5)
SODIUM SERPL-SCNC: 141 MMOL/L — SIGNIFICANT CHANGE UP (ref 135–145)
SP GR SPEC: 1.01 — SIGNIFICANT CHANGE UP (ref 1.01–1.02)
TROPONIN I, HIGH SENSITIVITY RESULT: 4.4 NG/L — SIGNIFICANT CHANGE UP
UROBILINOGEN FLD QL: NEGATIVE — SIGNIFICANT CHANGE UP
WBC # BLD: 6.86 K/UL — SIGNIFICANT CHANGE UP (ref 3.8–10.5)
WBC # FLD AUTO: 6.86 K/UL — SIGNIFICANT CHANGE UP (ref 3.8–10.5)

## 2022-11-17 PROCEDURE — 86901 BLOOD TYPING SEROLOGIC RH(D): CPT

## 2022-11-17 PROCEDURE — 85610 PROTHROMBIN TIME: CPT

## 2022-11-17 PROCEDURE — 86850 RBC ANTIBODY SCREEN: CPT

## 2022-11-17 PROCEDURE — 99285 EMERGENCY DEPT VISIT HI MDM: CPT

## 2022-11-17 PROCEDURE — 85025 COMPLETE CBC W/AUTO DIFF WBC: CPT

## 2022-11-17 PROCEDURE — G1004: CPT

## 2022-11-17 PROCEDURE — 93010 ELECTROCARDIOGRAM REPORT: CPT

## 2022-11-17 PROCEDURE — 96374 THER/PROPH/DIAG INJ IV PUSH: CPT

## 2022-11-17 PROCEDURE — 71045 X-RAY EXAM CHEST 1 VIEW: CPT | Mod: 26

## 2022-11-17 PROCEDURE — 74177 CT ABD & PELVIS W/CONTRAST: CPT | Mod: MG

## 2022-11-17 PROCEDURE — 99285 EMERGENCY DEPT VISIT HI MDM: CPT | Mod: 25

## 2022-11-17 PROCEDURE — 71045 X-RAY EXAM CHEST 1 VIEW: CPT

## 2022-11-17 PROCEDURE — 87086 URINE CULTURE/COLONY COUNT: CPT

## 2022-11-17 PROCEDURE — 84484 ASSAY OF TROPONIN QUANT: CPT

## 2022-11-17 PROCEDURE — 83690 ASSAY OF LIPASE: CPT

## 2022-11-17 PROCEDURE — 36415 COLL VENOUS BLD VENIPUNCTURE: CPT

## 2022-11-17 PROCEDURE — 96375 TX/PRO/DX INJ NEW DRUG ADDON: CPT

## 2022-11-17 PROCEDURE — 93005 ELECTROCARDIOGRAM TRACING: CPT

## 2022-11-17 PROCEDURE — 80053 COMPREHEN METABOLIC PANEL: CPT

## 2022-11-17 PROCEDURE — 74177 CT ABD & PELVIS W/CONTRAST: CPT | Mod: 26,MG

## 2022-11-17 PROCEDURE — 86900 BLOOD TYPING SEROLOGIC ABO: CPT

## 2022-11-17 PROCEDURE — 81001 URINALYSIS AUTO W/SCOPE: CPT

## 2022-11-17 PROCEDURE — 85730 THROMBOPLASTIN TIME PARTIAL: CPT

## 2022-11-17 RX ORDER — KETOROLAC TROMETHAMINE 30 MG/ML
30 SYRINGE (ML) INJECTION ONCE
Refills: 0 | Status: DISCONTINUED | OUTPATIENT
Start: 2022-11-17 | End: 2022-11-17

## 2022-11-17 RX ORDER — SODIUM CHLORIDE 9 MG/ML
1000 INJECTION INTRAMUSCULAR; INTRAVENOUS; SUBCUTANEOUS ONCE
Refills: 0 | Status: COMPLETED | OUTPATIENT
Start: 2022-11-17 | End: 2022-11-17

## 2022-11-17 RX ORDER — HYDROMORPHONE HYDROCHLORIDE 2 MG/ML
1 INJECTION INTRAMUSCULAR; INTRAVENOUS; SUBCUTANEOUS ONCE
Refills: 0 | Status: DISCONTINUED | OUTPATIENT
Start: 2022-11-17 | End: 2022-11-17

## 2022-11-17 RX ADMIN — Medication 30 MILLIGRAM(S): at 20:04

## 2022-11-17 RX ADMIN — HYDROMORPHONE HYDROCHLORIDE 1 MILLIGRAM(S): 2 INJECTION INTRAMUSCULAR; INTRAVENOUS; SUBCUTANEOUS at 16:55

## 2022-11-17 RX ADMIN — HYDROMORPHONE HYDROCHLORIDE 1 MILLIGRAM(S): 2 INJECTION INTRAMUSCULAR; INTRAVENOUS; SUBCUTANEOUS at 19:20

## 2022-11-17 RX ADMIN — Medication 30 MILLIGRAM(S): at 20:30

## 2022-11-17 RX ADMIN — SODIUM CHLORIDE 1000 MILLILITER(S): 9 INJECTION INTRAMUSCULAR; INTRAVENOUS; SUBCUTANEOUS at 17:03

## 2022-11-17 NOTE — ED ADULT NURSE NOTE - CHIEF COMPLAINT QUOTE
pt ambulatory to triage c/o abd pain x2 hours pta. pt had gastric bypass with Dr. pineda in March. pt states last monday she had stent placed to open up GI tract. allergic to sulfa, penicillin, cephalosporin. denies N/V/D

## 2022-11-17 NOTE — ED PROVIDER NOTE - OBJECTIVE STATEMENT
57 yo female wPMHx of obesity, chronic UTI, hyperlipidemia, HTN s/p lap gastric RNY bypass March 22 by Dr. Horan for obesity, s/p EGD with stent for anastomotic stricture Nov 7th by Dr. Montes De Oca presents to the ED ambulatory to triage c/o acute onset mid abd pain around 2pm today. Pt describes the pain as sharp and + initial radiation to back but not anymore. + Flatus last, BM at 1 pm. + symptomatic relief after IV 57 yo female w/ PMHx of obesity, chronic UTI, hyperlipidemia, HTN s/p lap gastric RNY bypass March 22 by Dr. Horan for obesity, s/p EGD with stent for anastomotic stricture Nov 7th by Dr. Montes De Oca presents to the ED ambulatory to triage c/o acute onset mid abd pain around 2pm today. Pt describes the pain as sharp and + initial radiation to back but not anymore. + Flatus last, BM at 1 pm. + symptomatic relief after IVF & IV pain med.

## 2022-11-17 NOTE — ED PROVIDER NOTE - NSICDXFAMILYHX_GEN_ALL_CORE_FT
FAMILY HISTORY:  Father  Still living? No  Family history of heart attack, Age at diagnosis: Age Unknown  Family history of hypertension, Age at diagnosis: Age Unknown    Mother  Still living? No  Family history of hypertension, Age at diagnosis: Age Unknown  Family history of uterine cancer, Age at diagnosis: Age Unknown    Sibling  Still living? Unknown  FHx: bladder cancer, Age at diagnosis: Age Unknown

## 2022-11-17 NOTE — ED PROVIDER NOTE - ENMT, MLM
Airway patent, Nasal mucosa clear. Mouth with normal mucosa. Throat has no vesicles, no oropharyngeal exudates and uvula is midline, oropharynx clear mucus membrane slightly dry Airway patent, Nasal mucosa clear. Throat has no vesicles, no oropharyngeal exudates and uvula is midline, oropharynx clear mucus membrane slightly dry

## 2022-11-17 NOTE — ED PROVIDER NOTE - CONSTITUTIONAL, MLM
white female adult awake alert non respiratory distress non toxic normal... white female adult awake alert no respiratory distress non-toxic

## 2022-11-17 NOTE — ED PROVIDER NOTE - PATIENT PORTAL LINK FT
You can access the FollowMyHealth Patient Portal offered by [facility] by registering at the following website: [email or web address]. By joining [employer]’s FollowMyHealth portal, you will also be able to view your health information using other applications (apps) compatible with our system.

## 2022-11-17 NOTE — ED PROVIDER NOTE - MUSCULOSKELETAL, MLM
DARBY x4 bilateral SLR 40 degrees with good motor DARBY x4 bilateral SLR 40 degrees with good motor, no focal extremity swelling/tender.

## 2022-11-17 NOTE — ED PROVIDER NOTE - CARE PROVIDER_API CALL
Mark Horan)  Surgery  [address], [address]  [place] [address]  Phone: [phone]  Fax: [phone]  Follow Up Time:

## 2022-11-17 NOTE — ED PROVIDER NOTE - GASTROINTESTINAL, MLM
Bowel sounds hypoactive normal pitch, + epigastric tenderness no obvious mass no rebound,  differed no flank or CVA tenderness Bowel sounds hypoactive normal pitch, + epigastric tenderness no obvious mass no rebound

## 2022-11-17 NOTE — CONSULT NOTE ADULT - SUBJECTIVE AND OBJECTIVE BOX
MS Miranda is a 56 year-old lady with history of HTN, HLD, obesity, chronic UTI, s/p lap gastric RNY bypass March 22 by Dr. Horan for obesity, s/p EGD with stent for anastomotic stricture Nov 7th by Dr. Montes De Oca

## 2022-11-17 NOTE — ED ADULT TRIAGE NOTE - CHIEF COMPLAINT QUOTE
pt ambulatory to triage c/o abd pain x2 hours pta. pt had gastric bypass with Dr. pineda in March. pt states last monday she had stent placed to open up GI tract. allergic to sulfa, penicillin, cephalosporin. pt ambulatory to triage c/o abd pain x2 hours pta. pt had gastric bypass with Dr. pineda in March. pt states last monday she had stent placed to open up GI tract. allergic to sulfa, penicillin, cephalosporin. denies N/V/D

## 2022-11-17 NOTE — ED PROVIDER NOTE - NSFOLLOWUPINSTRUCTIONS_ED_ALL_ED_FT
Continue your regular medications as per routine.  Drink plenty of oral fluids frequently.  Follow up Dr. Horan: call office tomorrow for appointment.      Abdominal Pain    Many things can cause abdominal pain. Many times, abdominal pain is not caused by a disease and will improve without treatment. Your health care provider will do a physical exam to determine if there is a dangerous cause of your pain; blood tests and imaging may help determine the cause of your pain. However, in many cases, no cause may be found and you may need further testing as an outpatient. Monitor your abdominal pain for any changes.     SEEK IMMEDIATE MEDICAL CARE IF YOU HAVE ANY OF THE FOLLOWING SYMPTOMS: worsening abdominal pain, uncontrollable vomiting, profuse diarrhea, inability to have bowel movements or pass gas, black or bloody stools, fever accompanying chest pain or back pain, or fainting. These symptoms may represent a serious problem that is an emergency. Do not wait to see if the symptoms will go away. Get medical help right away. Call 911 and do not drive yourself to the hospital.

## 2022-11-17 NOTE — ED PROVIDER NOTE - CLINICAL SUMMARY MEDICAL DECISION MAKING FREE TEXT BOX
57 yo female wPMHx of obesity, chronic UTI, hyperlipidemia, HTN s/p lap gastric RNY bypass March 22 by Dr. Horan for obesity, s/p EGD with stent for anastomotic stricture Nov 7th by Dr. Montes De Oca presents to the ED ambulatory to triage c/o acute onset mid/upper abd pain around 2pm today. +Tender epigastric without any obvious mass. Vital signs stable, no nausea/vomiting, + flatus. + clinical concern for issue with the recent stent position vs other complication of prior bariatric surgery. Plan: EKG, CXR, bariatric protocol, labs including lipase, troponin, urine, IV fluid, IV Dilaudid, discuss with Dr. Horan, monitor, observe, reassess. 55 yo female wPMHx of obesity, chronic UTI, hyperlipidemia, HTN s/p lap gastric RNY bypass March 22 by Dr. Horan for obesity, s/p EGD with stent for anastomotic stricture Nov 7th by Dr. Montes De Oca presents to the ED ambulatory to triage c/o acute onset mid/upper abd pain around 2pm today. +Tender epigastric without any obvious mass. Vital signs stable, no nausea/vomiting, + flatus. + clinical concern for issue with the recent stent position vs other complication of prior bariatric surgery.   Plan: EKG, CXR, bariatric protocol, labs including lipase, troponin, urine, IV fluid, IV Dilaudid, discuss with Dr. Horan, monitor, observe, reassess.  Addendum: Case d/w  Dr. Horan, who reviewed CT images & report: no indication for further intervention nor admission. Advises pt DC for office f/u.  Pt re-evaluated, + feels better, pain much improved.  Results of all tests d/w pt, who agrees with DC home for outpt f/u with Dr. Horan.  U/A weakly suggestive of UTI: pt denies urine frequency/dysuria, + low clinical suspicion for UTI, will not treat, sending Urine culture.

## 2022-11-17 NOTE — ED ADULT NURSE NOTE - OBJECTIVE STATEMENT
patient ambulatory to ED c/o abdominal pain.  patient had recent gastric bypass, then had stent placed in stomach about 2-3 weeks ago.  reports unable to tolerate PO and abdominal pain.

## 2022-11-17 NOTE — ED PROVIDER NOTE - PROGRESS NOTE DETAILS
Kriss Horan MACY Jackson MT resulted, no acute pathology to eplain pt's pain.  Calling Dr. Horan. lisa Simmons MD:  Received call back from Dr. Horan, who reviewed Ct images & report: no indication for further intervention, admission. Advises pt Dc for office f/u.  Pt re-evaluated, + feels better, pain much improved.  Results of all tests d/w pt, who agrees with DC home for outpt f/u with Dr. Horan.  U/A weakly suggestive of UTI: pt denies urine frequency/dysuria, + low clinical suspicion for UTI, will not treat, sending Urine culture. MACY Simmons MD:  CT resulted, no acute pathology to explain pt's pain.  Calling Dr. Horan. lisa Simmons MD:  Received call back from Dr. Horan, who reviewed CT images & report: no indication for further intervention nor admission. Advises pt DC for office f/u.  Pt re-evaluated, + feels better, pain much improved.  Results of all tests d/w pt, who agrees with DC home for outpt f/u with Dr. Horan.  U/A weakly suggestive of UTI: pt denies urine frequency/dysuria, + low clinical suspicion for UTI, will not treat, sending Urine culture.

## 2022-11-18 LAB
CULTURE RESULTS: SIGNIFICANT CHANGE UP
SPECIMEN SOURCE: SIGNIFICANT CHANGE UP

## 2022-12-28 ENCOUNTER — OUTPATIENT (OUTPATIENT)
Dept: OUTPATIENT SERVICES | Facility: HOSPITAL | Age: 57
LOS: 1 days | End: 2022-12-28
Payer: COMMERCIAL

## 2022-12-28 VITALS
RESPIRATION RATE: 16 BRPM | TEMPERATURE: 98 F | SYSTOLIC BLOOD PRESSURE: 120 MMHG | HEART RATE: 73 BPM | WEIGHT: 130.95 LBS | HEIGHT: 62 IN | DIASTOLIC BLOOD PRESSURE: 57 MMHG | OXYGEN SATURATION: 99 %

## 2022-12-28 DIAGNOSIS — Z96.7 PRESENCE OF OTHER BONE AND TENDON IMPLANTS: Chronic | ICD-10-CM

## 2022-12-28 DIAGNOSIS — K22.2 ESOPHAGEAL OBSTRUCTION: ICD-10-CM

## 2022-12-28 DIAGNOSIS — Z90.49 ACQUIRED ABSENCE OF OTHER SPECIFIED PARTS OF DIGESTIVE TRACT: Chronic | ICD-10-CM

## 2022-12-28 DIAGNOSIS — Z98.890 OTHER SPECIFIED POSTPROCEDURAL STATES: Chronic | ICD-10-CM

## 2022-12-28 DIAGNOSIS — N82.3 FISTULA OF VAGINA TO LARGE INTESTINE: Chronic | ICD-10-CM

## 2022-12-28 DIAGNOSIS — Z41.9 ENCOUNTER FOR PROCEDURE FOR PURPOSES OTHER THAN REMEDYING HEALTH STATE, UNSPECIFIED: Chronic | ICD-10-CM

## 2022-12-28 DIAGNOSIS — S96.812A STRAIN OF OTHER SPECIFIED MUSCLES AND TENDONS AT ANKLE AND FOOT LEVEL, LEFT FOOT, INITIAL ENCOUNTER: Chronic | ICD-10-CM

## 2022-12-28 DIAGNOSIS — N81.10 CYSTOCELE, UNSPECIFIED: Chronic | ICD-10-CM

## 2022-12-28 LAB
ANION GAP SERPL CALC-SCNC: 4 MMOL/L — LOW (ref 5–17)
APTT BLD: 35.5 SEC — SIGNIFICANT CHANGE UP (ref 27.5–35.5)
BASOPHILS # BLD AUTO: 0.06 K/UL — SIGNIFICANT CHANGE UP (ref 0–0.2)
BASOPHILS NFR BLD AUTO: 0.9 % — SIGNIFICANT CHANGE UP (ref 0–2)
BUN SERPL-MCNC: 8 MG/DL — SIGNIFICANT CHANGE UP (ref 7–23)
CALCIUM SERPL-MCNC: 9.2 MG/DL — SIGNIFICANT CHANGE UP (ref 8.5–10.1)
CHLORIDE SERPL-SCNC: 107 MMOL/L — SIGNIFICANT CHANGE UP (ref 96–108)
CO2 SERPL-SCNC: 30 MMOL/L — SIGNIFICANT CHANGE UP (ref 22–31)
CREAT SERPL-MCNC: 0.56 MG/DL — SIGNIFICANT CHANGE UP (ref 0.5–1.3)
EGFR: 107 ML/MIN/1.73M2 — SIGNIFICANT CHANGE UP
EOSINOPHIL # BLD AUTO: 0.21 K/UL — SIGNIFICANT CHANGE UP (ref 0–0.5)
EOSINOPHIL NFR BLD AUTO: 3.2 % — SIGNIFICANT CHANGE UP (ref 0–6)
GLUCOSE SERPL-MCNC: 106 MG/DL — HIGH (ref 70–99)
HCT VFR BLD CALC: 38.1 % — SIGNIFICANT CHANGE UP (ref 34.5–45)
HGB BLD-MCNC: 12.3 G/DL — SIGNIFICANT CHANGE UP (ref 11.5–15.5)
IMM GRANULOCYTES NFR BLD AUTO: 0.2 % — SIGNIFICANT CHANGE UP (ref 0–0.9)
INR BLD: 1.12 RATIO — SIGNIFICANT CHANGE UP (ref 0.88–1.16)
LYMPHOCYTES # BLD AUTO: 1.67 K/UL — SIGNIFICANT CHANGE UP (ref 1–3.3)
LYMPHOCYTES # BLD AUTO: 25.3 % — SIGNIFICANT CHANGE UP (ref 13–44)
MCHC RBC-ENTMCNC: 28.9 PG — SIGNIFICANT CHANGE UP (ref 27–34)
MCHC RBC-ENTMCNC: 32.3 GM/DL — SIGNIFICANT CHANGE UP (ref 32–36)
MCV RBC AUTO: 89.6 FL — SIGNIFICANT CHANGE UP (ref 80–100)
MONOCYTES # BLD AUTO: 0.31 K/UL — SIGNIFICANT CHANGE UP (ref 0–0.9)
MONOCYTES NFR BLD AUTO: 4.7 % — SIGNIFICANT CHANGE UP (ref 2–14)
NEUTROPHILS # BLD AUTO: 4.33 K/UL — SIGNIFICANT CHANGE UP (ref 1.8–7.4)
NEUTROPHILS NFR BLD AUTO: 65.7 % — SIGNIFICANT CHANGE UP (ref 43–77)
PLATELET # BLD AUTO: 306 K/UL — SIGNIFICANT CHANGE UP (ref 150–400)
POTASSIUM SERPL-MCNC: 3.7 MMOL/L — SIGNIFICANT CHANGE UP (ref 3.5–5.3)
POTASSIUM SERPL-SCNC: 3.7 MMOL/L — SIGNIFICANT CHANGE UP (ref 3.5–5.3)
PROTHROM AB SERPL-ACNC: 13 SEC — SIGNIFICANT CHANGE UP (ref 10.5–13.4)
RBC # BLD: 4.25 M/UL — SIGNIFICANT CHANGE UP (ref 3.8–5.2)
RBC # FLD: 13 % — SIGNIFICANT CHANGE UP (ref 10.3–14.5)
SARS-COV-2 N GENE NPH QL NAA+PROBE: NOT DETECTED
SODIUM SERPL-SCNC: 141 MMOL/L — SIGNIFICANT CHANGE UP (ref 135–145)
WBC # BLD: 6.59 K/UL — SIGNIFICANT CHANGE UP (ref 3.8–10.5)
WBC # FLD AUTO: 6.59 K/UL — SIGNIFICANT CHANGE UP (ref 3.8–10.5)

## 2022-12-28 PROCEDURE — 99214 OFFICE O/P EST MOD 30 MIN: CPT | Mod: 25

## 2022-12-28 PROCEDURE — 93010 ELECTROCARDIOGRAM REPORT: CPT

## 2022-12-28 PROCEDURE — 80048 BASIC METABOLIC PNL TOTAL CA: CPT

## 2022-12-28 PROCEDURE — 85025 COMPLETE CBC W/AUTO DIFF WBC: CPT

## 2022-12-28 PROCEDURE — 85610 PROTHROMBIN TIME: CPT

## 2022-12-28 PROCEDURE — 85730 THROMBOPLASTIN TIME PARTIAL: CPT

## 2022-12-28 PROCEDURE — 36415 COLL VENOUS BLD VENIPUNCTURE: CPT

## 2022-12-28 PROCEDURE — 93005 ELECTROCARDIOGRAM TRACING: CPT

## 2022-12-28 RX ORDER — ERGOCALCIFEROL 1.25 MG/1
1 CAPSULE ORAL
Qty: 0 | Refills: 0 | DISCHARGE

## 2022-12-28 NOTE — H&P PST ADULT - PSYCHIATRIC
Attempt was made to contact patient's relative, Danya Hernandez, in regards to recent vital signs, labs, findings, and recommendations by the primary team as it pertains to the current admission for COVID-19.  However, was unable to reach this point of contact at the listed numbers.  Patient to be called again tomorrow with an update.   negative normal affect

## 2022-12-28 NOTE — H&P PST ADULT - HISTORY OF PRESENT ILLNESS
55 y/o female with history of suggs's esophagus, GERD and obesity presents to PST for scheduled gastric bypass on 3/7/22 for weight loss and GERD. 57 year old female diagnosed with esophageal stricture after gastric bypass; s/p stent placement; n/v now resolved; she presents to PST for planned EGD stent removal

## 2022-12-28 NOTE — H&P PST ADULT - PATIENT ON (OXYGEN DELIVERY METHOD)
Donny Kaur is a 74 year old male presenting with [facility] [place] [facility] [place]. Monday night.    Pt fell and hit his head and son states he was out for less then a minute.      Medication verified and med list updated  Denies known Latex allergy or symptoms of Latex sensitivity  Patient would like communication of their results via:   Home Phone: [phone] (home)  Okay to leave a message containing results? Yes    Health Maintenance Due   Topic Date Due   • Depression Screening  03/13/1956   • Colorectal Cancer Screening-Colonoscopy  03/13/1995   • Medicare Wellness 65+  03/13/2010      room air

## 2022-12-28 NOTE — H&P PST ADULT - ASSESSMENT
57 year old female diagnosed with esophageal stricture after gastric bypass; s/p stent placement; n/v now resolved; she presents to PST for planned EGD stent removal     1.  Dr Montes De Oca  office  will instruct patient regarding bowel prep and  medication regimen on day of procedure.  2. Endoscopy booking will call patient the day before surgery for arrival instructions   3. NPO post midnight  4. CBC BMP coags  EKG done   5. Patient instructed to have responsible adult accompany/ drive pt home after procedure  6. Covid test to be scheduled by patient and instructed to quarantine after covid test

## 2022-12-28 NOTE — H&P PST ADULT - NSICDXPASTSURGICALHX_GEN_ALL_CORE_FT
PAST SURGICAL HISTORY:  Cystocele with rectocele 5/11/2017    Elective surgery removal of hepatic adenoma 2006    Hepatic adenoma resected 2006    History of rectal surgery 2013 interstimulator for rectal incontinence  later removed 2016    Rectovaginal fistula surgery - 07/2017    S/P appendectomy 1979    S/P breast augmentation reduction 2003    S/P colonoscopy multiple    S/P exploratory laparotomy cholecystectomy, ROZINA, endometriosis    S/P hysterectomy with oophorectomy with mesh for stress incontinence    S/P LASIK surgery of both eyes 2000    S/P Nissen fundoplication (with gastrostomy tube placement) for barrets esophagus 2006    S/P ORIF (open reduction internal fixation) fracture left foot 2016    S/P tonsillectomy 1979    Stress incontinence, female right side of mesh "cut" adjusted    Tendon rupture, post-op repaired right elbow 2011    Traumatic rupture of plantar fascia of left foot, initial encounter surgery     PAST SURGICAL HISTORY:  Cystocele with rectocele 5/11/2017    Elective surgery removal of hepatic adenoma 2006    H/O elbow surgery     H/O endoscopy     H/O hernia repair     Hepatic adenoma resected 2006    History of bilateral breast reduction surgery     History of cholecystectomy     History of rectal surgery 2013 interstimulator for rectal incontinence  later removed 2016    History of vaginal surgery     Rectovaginal fistula surgery - 07/2017    S/P appendectomy 1979    S/P colonoscopy multiple    S/P exploratory laparotomy cholecystectomy, ROZINA, endometriosis    S/P foot surgery, right     S/P hysterectomy with oophorectomy with mesh for stress incontinence    S/P LASIK surgery of both eyes 2000    S/P Nissen fundoplication (with gastrostomy tube placement) for barrets esophagus 2006    S/P ORIF (open reduction internal fixation) fracture left foot 2016    S/P tonsillectomy 1979    Stress incontinence, female right side of mesh "cut" adjusted    Tendon rupture, post-op repaired right elbow 2011    Traumatic rupture of plantar fascia of left foot, initial encounter surgery

## 2022-12-28 NOTE — H&P PST ADULT - NSICDXPASTMEDICALHX_GEN_ALL_CORE_FT
PAST MEDICAL HISTORY:  Acid reflux     Ahn esophagus     Chronic UTI     Constipation     Endometriosis     Hemorrhoids, unspecified hemorrhoid type     Hepatic adenoma History of. Surgically removed 2006    Hormone replacement therapy     Hyperlipidemia     Hypertension     Incontinence of feces, unspecified fecal incontinence type     Injury to sacral nerve root, subsequent encounter     Irritable bowel syndrome with both constipation and diarrhea     Obesity     Pelvic floor dysfunction     Pudendal neuralgia     Rectocele History of 2015    Stress incontinence, female     Thyroid cyst     Torn meniscus left    UTI (urinary tract infection) frequent    Vitamin D deficiency      PAST MEDICAL HISTORY:  Acid reflux     Ahn esophagus     Chronic UTI     Constipation     Endometriosis     Hemorrhoids, unspecified hemorrhoid type     Hepatic adenoma History of. Surgically removed 2006    Herniated thoracic disc without myelopathy     Hormone replacement therapy     Hyperlipidemia     Hypertension     Incontinence of feces, unspecified fecal incontinence type     Injury to sacral nerve root, subsequent encounter     Irritable bowel syndrome with both constipation and diarrhea     Obesity     Pelvic floor dysfunction     Pudendal neuralgia     Rectocele History of 2015    Stress incontinence, female     Thyroid cyst     Torn meniscus left    UTI (urinary tract infection) frequent    Vitamin D deficiency

## 2022-12-29 DIAGNOSIS — K22.2 ESOPHAGEAL OBSTRUCTION: ICD-10-CM

## 2022-12-29 RX ORDER — ESZOPICLONE 2 MG/1
1 TABLET, COATED ORAL
Qty: 0 | Refills: 0 | DISCHARGE

## 2022-12-29 RX ORDER — NALOXEGOL OXALATE 12.5 MG/1
0.5 TABLET, FILM COATED ORAL
Qty: 0 | Refills: 0 | DISCHARGE

## 2022-12-29 RX ORDER — NALOXEGOL OXALATE 12.5 MG/1
1 TABLET, FILM COATED ORAL
Qty: 0 | Refills: 0 | DISCHARGE

## 2022-12-29 RX ORDER — NEBIVOLOL HYDROCHLORIDE 5 MG/1
1 TABLET ORAL
Qty: 0 | Refills: 0 | DISCHARGE

## 2022-12-29 RX ORDER — ROSUVASTATIN CALCIUM 5 MG/1
1 TABLET ORAL
Qty: 0 | Refills: 0 | DISCHARGE

## 2022-12-29 NOTE — ASU PATIENT PROFILE, ADULT - FALL HARM RISK - UNIVERSAL INTERVENTIONS
Bed in lowest position, wheels locked, appropriate side rails in place/Call bell, personal items and telephone in reach/Instruct patient to call for assistance before getting out of bed or chair/Non-slip footwear when patient is out of bed/[place] to call system/Physically safe environment - no spills, clutter or unnecessary equipment/Purposeful Proactive Rounding/Room/bathroom lighting operational, light cord in reach

## 2022-12-29 NOTE — ASU PATIENT PROFILE, ADULT - NSICDXPASTSURGICALHX_GEN_ALL_CORE_FT
PAST SURGICAL HISTORY:  Cystocele with rectocele 5/11/2017    Elective surgery removal of hepatic adenoma 2006    H/O elbow surgery     H/O endoscopy     H/O hernia repair     Hepatic adenoma resected 2006    History of bilateral breast reduction surgery     History of cholecystectomy     History of rectal surgery 2013 interstimulator for rectal incontinence  later removed 2016    History of vaginal surgery     Rectovaginal fistula surgery - 07/2017    S/P appendectomy 1979    S/P colonoscopy multiple    S/P exploratory laparotomy cholecystectomy, ROZINA, endometriosis    S/P foot surgery, right     S/P hysterectomy with oophorectomy with mesh for stress incontinence    S/P LASIK surgery of both eyes 2000    S/P Nissen fundoplication (with gastrostomy tube placement) for barrets esophagus 2006    S/P ORIF (open reduction internal fixation) fracture left foot 2016    S/P tonsillectomy 1979    Stress incontinence, female right side of mesh "cut" adjusted    Tendon rupture, post-op repaired right elbow 2011    Traumatic rupture of plantar fascia of left foot, initial encounter surgery

## 2022-12-29 NOTE — ASU PATIENT PROFILE, ADULT - REASON FOR ADMISSION, PROFILE
endoscopy with stent removal from stomach

## 2022-12-29 NOTE — ASU PATIENT PROFILE, ADULT - NSICDXPASTMEDICALHX_GEN_ALL_CORE_FT
PAST MEDICAL HISTORY:  Acid reflux     Ahn esophagus     Chronic UTI     Constipation     Endometriosis     Hemorrhoids, unspecified hemorrhoid type     Hepatic adenoma History of. Surgically removed 2006    Herniated thoracic disc without myelopathy     Hormone replacement therapy     Hyperlipidemia     Hypertension     Incontinence of feces, unspecified fecal incontinence type     Injury to sacral nerve root, subsequent encounter     Irritable bowel syndrome with both constipation and diarrhea     Obesity     Pelvic floor dysfunction     Pudendal neuralgia     Rectocele History of 2015    Stress incontinence, female     Thyroid cyst     Torn meniscus left    UTI (urinary tract infection) frequent    Vitamin D deficiency

## 2022-12-30 ENCOUNTER — OUTPATIENT (OUTPATIENT)
Dept: INPATIENT UNIT | Facility: HOSPITAL | Age: 57
LOS: 1 days | Discharge: HOME | End: 2022-12-30

## 2022-12-30 ENCOUNTER — APPOINTMENT (OUTPATIENT)
Dept: GASTROENTEROLOGY | Facility: HOSPITAL | Age: 57
End: 2022-12-30
Payer: COMMERCIAL

## 2022-12-30 ENCOUNTER — TRANSCRIPTION ENCOUNTER (OUTPATIENT)
Age: 57
End: 2022-12-30

## 2022-12-30 VITALS
RESPIRATION RATE: 14 BRPM | TEMPERATURE: 98 F | OXYGEN SATURATION: 99 % | DIASTOLIC BLOOD PRESSURE: 65 MMHG | HEART RATE: 65 BPM | SYSTOLIC BLOOD PRESSURE: 134 MMHG

## 2022-12-30 VITALS
HEART RATE: 71 BPM | SYSTOLIC BLOOD PRESSURE: 132 MMHG | DIASTOLIC BLOOD PRESSURE: 75 MMHG | RESPIRATION RATE: 14 BRPM | WEIGHT: 130.95 LBS | TEMPERATURE: 98 F | OXYGEN SATURATION: 99 % | HEIGHT: 62 IN

## 2022-12-30 DIAGNOSIS — N81.10 CYSTOCELE, UNSPECIFIED: Chronic | ICD-10-CM

## 2022-12-30 DIAGNOSIS — Z98.890 OTHER SPECIFIED POSTPROCEDURAL STATES: Chronic | ICD-10-CM

## 2022-12-30 DIAGNOSIS — N82.3 FISTULA OF VAGINA TO LARGE INTESTINE: Chronic | ICD-10-CM

## 2022-12-30 DIAGNOSIS — S96.812A STRAIN OF OTHER SPECIFIED MUSCLES AND TENDONS AT ANKLE AND FOOT LEVEL, LEFT FOOT, INITIAL ENCOUNTER: Chronic | ICD-10-CM

## 2022-12-30 DIAGNOSIS — Z41.9 ENCOUNTER FOR PROCEDURE FOR PURPOSES OTHER THAN REMEDYING HEALTH STATE, UNSPECIFIED: Chronic | ICD-10-CM

## 2022-12-30 DIAGNOSIS — Z96.7 PRESENCE OF OTHER BONE AND TENDON IMPLANTS: Chronic | ICD-10-CM

## 2022-12-30 DIAGNOSIS — K22.2 ESOPHAGEAL OBSTRUCTION: ICD-10-CM

## 2022-12-30 DIAGNOSIS — Z90.49 ACQUIRED ABSENCE OF OTHER SPECIFIED PARTS OF DIGESTIVE TRACT: Chronic | ICD-10-CM

## 2022-12-30 PROCEDURE — 43247 EGD REMOVE FOREIGN BODY: CPT

## 2022-12-30 RX ORDER — LIRAGLUTIDE 6 MG/ML
0 INJECTION SUBCUTANEOUS
Qty: 0 | Refills: 0 | DISCHARGE

## 2022-12-30 RX ORDER — OXYCODONE AND ACETAMINOPHEN 5; 325 MG/1; MG/1
0 TABLET ORAL
Qty: 0 | Refills: 0 | DISCHARGE

## 2022-12-30 RX ORDER — OXYCODONE HYDROCHLORIDE 5 MG/1
0 TABLET ORAL
Qty: 0 | Refills: 0 | DISCHARGE

## 2022-12-30 RX ORDER — NEBIVOLOL HYDROCHLORIDE 5 MG/1
1 TABLET ORAL
Qty: 0 | Refills: 0 | DISCHARGE

## 2022-12-30 RX ORDER — NALOXEGOL OXALATE 12.5 MG/1
1 TABLET, FILM COATED ORAL
Qty: 0 | Refills: 0 | DISCHARGE

## 2022-12-30 RX ORDER — FLUOXETINE HCL 10 MG
1 CAPSULE ORAL
Qty: 0 | Refills: 0 | DISCHARGE

## 2022-12-30 NOTE — ASU DISCHARGE PLAN (ADULT/PEDIATRIC) - NURSING INSTRUCTIONS
For any problems or concerns,contact your doctor. [facility] patients should call the [facility]. If you cannot reach the doctor or clinic, call  Emergency Department at [phone] or go to your local Emergency Department.  A responsible adult should be with you for the rest of the day and night for your safety and to help you if you needed. Resume your medications as listed on the attached Medication Record. Begin with liquids and light food ( tea, toast, Jello, soups). Advance to what you normally eat. Liquids should taken in adequate amounts today.     CALL the DOCTOR:    -Fever greater than  101F  - Signs  of infection such as : increase pain,swelling,redness,or a bad  smell coming from the wound.  -Excessive amount of bleeding.  - Any pain that appears to be getting worse.  - Vomiting  -  If you have  not urinated 8 hours after surgery or have any difficulty urinating.     A responsible adult should be with you for the rest of the day and night for your safety and to help you if you needed.    Review attached FACT SHEET if applicable.

## 2022-12-30 NOTE — H&P PST ADULT - SYMPTOMS
[de-identified] : Regis Mcintosh is seen in consultation on November 28, 2022.  He was referred for evaluation of bilateral renal masses.  2 weeks prior, he was experiencing gross hematuria which was intermittent.  There were no clots.  He had no flank pain.  He did notes some "tightness" on the left side, ? to Parkinson's?.  He has a history of stones in the past.  CT was done as an a outpatient at [facility].  A mass on the right kidney extends into the sinus near the division of the right renal artery.  The imaging was most compatible with renal cell carcinoma.  The left kidney revealed a hydronephrotic pelvis.  There was hyperdense material on the noncontrast CT scan, and it partially enhances with contrast.  There were some enlarged nodes on the left side.  A CT scan of the chest was negative.  He was seen by Dr. Hernandez on November 23, and he was concerned that the left likely represents urothelial carcinoma with the associated lymphadenopathy.  The plan is for ureteroscopy and biopsy with a JJ stent, scheduled for December 1, 2022 .  His creatinine was 0.9.  He is referred for consideration of the possibility of neoadjuvant chemotherapy.  I spoke with Dr. Hernandez after the patient was evaluated by him.  He feels that the right side kidney mass might be amenable to a partial nephrectomy.\par \par He was initially evaluated by Dr. Sean Jurado.  He had a PSA elevation in September 2020 at 4.76.  In 2018 his PSA was 1.9.  In November 2021, his PSA was 3.88.  In mid November he had an Enterococcus faecalis urinary tract infection and was treated.  A CT urogram was performed revealing bilateral renal masses concerning for malignant etiologies.  The right renal mass measured up to 7.1 cm and was concerning for renal cell carcinoma.  It was endophytic.  There were subtle features present raising concern for possible early vascular and urinary collecting system invasion.  The left renal pelvis mass measured 7.2 cm and had the radiologic features of urothelial carcinoma.  The mass was enhancing.  Retroperitoneal adenopathy was present.  The bladder was partly obscured due to right hip arthroplasty artifact.  No definite bladder lesion was identified.  There was a nonobstructing 0.5 cm right sided ureteral stone located distally.  The report says that there was stable lower chest findings including mildly enlarged lower mediastinal lymph node and a tiny lung base nodule.  These findings were stable from a CT scan 2 years ago.\par \par There was new para-aortic lymphadenopathy compared to the prior scan from 2 years ago.  There is a left para-aortic lymph node at the level of the renal vasculature measuring 2.1 x 1.5 cm.  An anterior periaortic lymph node at the level of the LEONEL was 2.1 x 1.5 cm.\par Cystoscopy was performed on November 21 by Dr. Jurado.  Urethra was normal.  The prostatic urethra demonstrated obstruction due to lateral lobe enlargement of the prostate.  The bladder was normal.  Both ureteral orifices revealed clear E flux of urine.  No tumors were noted within the bladder.\par \par Overall, he feels "fine". He has minor aches and pains, present for a long time. He has some difficulty walking, can't do long distance, standing in one spot is an issue, diagnosed with the Parkinson's in 2010. Had DBS placed 3 years ago. He says that helped his symptoms a lot. NO headaches, no dizziness, some balance issues. No falls except once this summer, while trying to get out of the pool. Appetite has been normal, no weight loss. Occ swelling of the legs, no h/o DVT. No chest pain/pressure/palpitations. No cough, no SALDANA. Denies fatigue. Works daily, goes to PT once weekly, and home PT once a week in addition. Rides stationary bike a few times a week. No prior h/o hematuria. \par \par 12/19/22...started cis/gem on 12/13/22, day 7, cycle 1. Feels "good". He had no fatigue until 2 days ago, started 12/17, remains fatigued. Less active. Brief naps, not sleeping well at night. This has been the case since the ureteral stent. Flow is good, daytime frequency, nocturia x 3-4. No urgency. No incontinence. Occ blood, no clots. Some cough no SALDANA. NO fevers, no chills. No N/V/D, some constipation, on senna, Colace and MiraLAX. No headaches, no dizziness. Balance issues due to Parkinson's, uses rollator outside, but not in the house. No recent falls. No edema. No chest pain/pressure/palpitations. URI, with cough, using Robitussin [FreeTextEntry1] : 12/13/22....cis/gem started [de-identified] : 12/29/22 - cisplatin/gemcitabine cycle 1 day 17 today. Overall feeling fine, some fatigue. Last week rash started in groin area, he used nystatin cream bid with improvement in the  groin area but noticed new rash on his low back all the way down his backside to posterior knees. He used hydrocortisone cream bid which was helpful. Appetite has been decreased but reports adequate PO intake, weight is up 14lbs since last week. Occasional tinnitus of left ear lasting <15. Ongoing productive cough for >2wks, slowly improving. Trace edema of bilateral ankles is stable since before start of chemo. Stable low back pain 2/2 parkinsons. Denies fever, chills, night sweats, hot flashes, headache, dizziness, balance issues, eye pain/problems, mucositis/odynophagia, chest pain, palpitations, SOB, nausea/vomiting, diarrhea/constipation, abdominal pain, dysuria, hematuria, incontinence, neuropathy, bleeding.  none

## 2022-12-30 NOTE — ASU DISCHARGE PLAN (ADULT/PEDIATRIC) - NS MD DC FALL RISK RISK
For information on Fall & Injury Prevention, visit: [email or web address] OR  [email or web address] OR  https://www.cdc.gov/steadi/patient.html

## 2023-01-03 ENCOUNTER — INPATIENT (INPATIENT)
Facility: HOSPITAL | Age: 58
LOS: 0 days | Discharge: HOME | DRG: 393 | End: 2023-01-04
Attending: SURGERY | Admitting: SURGERY
Payer: COMMERCIAL

## 2023-01-03 VITALS — HEIGHT: 62 IN | WEIGHT: 126.99 LBS

## 2023-01-03 DIAGNOSIS — Z98.890 OTHER SPECIFIED POSTPROCEDURAL STATES: Chronic | ICD-10-CM

## 2023-01-03 DIAGNOSIS — S96.812A STRAIN OF OTHER SPECIFIED MUSCLES AND TENDONS AT ANKLE AND FOOT LEVEL, LEFT FOOT, INITIAL ENCOUNTER: Chronic | ICD-10-CM

## 2023-01-03 DIAGNOSIS — N81.10 CYSTOCELE, UNSPECIFIED: Chronic | ICD-10-CM

## 2023-01-03 DIAGNOSIS — Z90.49 ACQUIRED ABSENCE OF OTHER SPECIFIED PARTS OF DIGESTIVE TRACT: Chronic | ICD-10-CM

## 2023-01-03 DIAGNOSIS — N82.3 FISTULA OF VAGINA TO LARGE INTESTINE: Chronic | ICD-10-CM

## 2023-01-03 DIAGNOSIS — Z96.7 PRESENCE OF OTHER BONE AND TENDON IMPLANTS: Chronic | ICD-10-CM

## 2023-01-03 DIAGNOSIS — Z41.9 ENCOUNTER FOR PROCEDURE FOR PURPOSES OTHER THAN REMEDYING HEALTH STATE, UNSPECIFIED: Chronic | ICD-10-CM

## 2023-01-03 DIAGNOSIS — R11.2 NAUSEA WITH VOMITING, UNSPECIFIED: ICD-10-CM

## 2023-01-03 DIAGNOSIS — Z98.84 BARIATRIC SURGERY STATUS: Chronic | ICD-10-CM

## 2023-01-03 PROBLEM — M51.24 OTHER INTERVERTEBRAL DISC DISPLACEMENT, THORACIC REGION: Chronic | Status: ACTIVE | Noted: 2022-12-28

## 2023-01-03 LAB
ALBUMIN SERPL ELPH-MCNC: 3.4 G/DL — SIGNIFICANT CHANGE UP (ref 3.3–5)
ALP SERPL-CCNC: 113 U/L — SIGNIFICANT CHANGE UP (ref 40–120)
ALT FLD-CCNC: 23 U/L — SIGNIFICANT CHANGE UP (ref 12–78)
ANION GAP SERPL CALC-SCNC: 5 MMOL/L — SIGNIFICANT CHANGE UP (ref 5–17)
APPEARANCE UR: ABNORMAL
APTT BLD: 33.3 SEC — SIGNIFICANT CHANGE UP (ref 27.5–35.5)
AST SERPL-CCNC: 28 U/L — SIGNIFICANT CHANGE UP (ref 15–37)
BACTERIA # UR AUTO: ABNORMAL
BASOPHILS # BLD AUTO: 0.07 K/UL — SIGNIFICANT CHANGE UP (ref 0–0.2)
BASOPHILS NFR BLD AUTO: 1.1 % — SIGNIFICANT CHANGE UP (ref 0–2)
BILIRUB SERPL-MCNC: 0.3 MG/DL — SIGNIFICANT CHANGE UP (ref 0.2–1.2)
BILIRUB UR-MCNC: NEGATIVE — SIGNIFICANT CHANGE UP
BLD GP AB SCN SERPL QL: SIGNIFICANT CHANGE UP
BUN SERPL-MCNC: 8 MG/DL — SIGNIFICANT CHANGE UP (ref 7–23)
CALCIUM SERPL-MCNC: 9 MG/DL — SIGNIFICANT CHANGE UP (ref 8.5–10.1)
CHLORIDE SERPL-SCNC: 104 MMOL/L — SIGNIFICANT CHANGE UP (ref 96–108)
CO2 SERPL-SCNC: 31 MMOL/L — SIGNIFICANT CHANGE UP (ref 22–31)
COD CRY URNS QL: ABNORMAL
COLOR SPEC: YELLOW — SIGNIFICANT CHANGE UP
CREAT SERPL-MCNC: 0.73 MG/DL — SIGNIFICANT CHANGE UP (ref 0.5–1.3)
DIFF PNL FLD: ABNORMAL
EGFR: 96 ML/MIN/1.73M2 — SIGNIFICANT CHANGE UP
EOSINOPHIL # BLD AUTO: 0.19 K/UL — SIGNIFICANT CHANGE UP (ref 0–0.5)
EOSINOPHIL NFR BLD AUTO: 3.1 % — SIGNIFICANT CHANGE UP (ref 0–6)
EPI CELLS # UR: ABNORMAL
FLUAV AG NPH QL: SIGNIFICANT CHANGE UP
FLUBV AG NPH QL: SIGNIFICANT CHANGE UP
GLUCOSE SERPL-MCNC: 101 MG/DL — HIGH (ref 70–99)
GLUCOSE UR QL: NEGATIVE — SIGNIFICANT CHANGE UP
HCT VFR BLD CALC: 38.4 % — SIGNIFICANT CHANGE UP (ref 34.5–45)
HGB BLD-MCNC: 12.4 G/DL — SIGNIFICANT CHANGE UP (ref 11.5–15.5)
IMM GRANULOCYTES NFR BLD AUTO: 0.2 % — SIGNIFICANT CHANGE UP (ref 0–0.9)
INR BLD: 1.04 RATIO — SIGNIFICANT CHANGE UP (ref 0.88–1.16)
KETONES UR-MCNC: ABNORMAL
LEUKOCYTE ESTERASE UR-ACNC: ABNORMAL
LYMPHOCYTES # BLD AUTO: 3.04 K/UL — SIGNIFICANT CHANGE UP (ref 1–3.3)
LYMPHOCYTES # BLD AUTO: 49.5 % — HIGH (ref 13–44)
MCHC RBC-ENTMCNC: 28.9 PG — SIGNIFICANT CHANGE UP (ref 27–34)
MCHC RBC-ENTMCNC: 32.3 GM/DL — SIGNIFICANT CHANGE UP (ref 32–36)
MCV RBC AUTO: 89.5 FL — SIGNIFICANT CHANGE UP (ref 80–100)
MONOCYTES # BLD AUTO: 0.43 K/UL — SIGNIFICANT CHANGE UP (ref 0–0.9)
MONOCYTES NFR BLD AUTO: 7 % — SIGNIFICANT CHANGE UP (ref 2–14)
NEUTROPHILS # BLD AUTO: 2.4 K/UL — SIGNIFICANT CHANGE UP (ref 1.8–7.4)
NEUTROPHILS NFR BLD AUTO: 39.1 % — LOW (ref 43–77)
NITRITE UR-MCNC: NEGATIVE — SIGNIFICANT CHANGE UP
PH UR: 5 — SIGNIFICANT CHANGE UP (ref 5–8)
PLATELET # BLD AUTO: 211 K/UL — SIGNIFICANT CHANGE UP (ref 150–400)
POTASSIUM SERPL-MCNC: 3.6 MMOL/L — SIGNIFICANT CHANGE UP (ref 3.5–5.3)
POTASSIUM SERPL-SCNC: 3.6 MMOL/L — SIGNIFICANT CHANGE UP (ref 3.5–5.3)
PROT SERPL-MCNC: 7.3 GM/DL — SIGNIFICANT CHANGE UP (ref 6–8.3)
PROT UR-MCNC: 30 MG/DL
PROTHROM AB SERPL-ACNC: 12.1 SEC — SIGNIFICANT CHANGE UP (ref 10.5–13.4)
RBC # BLD: 4.29 M/UL — SIGNIFICANT CHANGE UP (ref 3.8–5.2)
RBC # FLD: 13 % — SIGNIFICANT CHANGE UP (ref 10.3–14.5)
RBC CASTS # UR COMP ASSIST: SIGNIFICANT CHANGE UP /HPF (ref 0–4)
RSV RNA NPH QL NAA+NON-PROBE: SIGNIFICANT CHANGE UP
SARS-COV-2 RNA SPEC QL NAA+PROBE: SIGNIFICANT CHANGE UP
SODIUM SERPL-SCNC: 140 MMOL/L — SIGNIFICANT CHANGE UP (ref 135–145)
SP GR SPEC: 1.02 — SIGNIFICANT CHANGE UP (ref 1.01–1.02)
UROBILINOGEN FLD QL: NEGATIVE — SIGNIFICANT CHANGE UP
WBC # BLD: 6.14 K/UL — SIGNIFICANT CHANGE UP (ref 3.8–10.5)
WBC # FLD AUTO: 6.14 K/UL — SIGNIFICANT CHANGE UP (ref 3.8–10.5)
WBC UR QL: ABNORMAL /HPF (ref 0–5)

## 2023-01-03 PROCEDURE — 93010 ELECTROCARDIOGRAM REPORT: CPT

## 2023-01-03 PROCEDURE — 80048 BASIC METABOLIC PNL TOTAL CA: CPT

## 2023-01-03 PROCEDURE — 90686 IIV4 VACC NO PRSV 0.5 ML IM: CPT

## 2023-01-03 PROCEDURE — 36415 COLL VENOUS BLD VENIPUNCTURE: CPT

## 2023-01-03 PROCEDURE — C1874: CPT

## 2023-01-03 PROCEDURE — 90471 IMMUNIZATION ADMIN: CPT

## 2023-01-03 PROCEDURE — 84100 ASSAY OF PHOSPHORUS: CPT

## 2023-01-03 PROCEDURE — 85610 PROTHROMBIN TIME: CPT

## 2023-01-03 PROCEDURE — 71046 X-RAY EXAM CHEST 2 VIEWS: CPT | Mod: 26

## 2023-01-03 PROCEDURE — 99254 IP/OBS CNSLTJ NEW/EST MOD 60: CPT

## 2023-01-03 PROCEDURE — 99285 EMERGENCY DEPT VISIT HI MDM: CPT

## 2023-01-03 PROCEDURE — 85027 COMPLETE CBC AUTOMATED: CPT

## 2023-01-03 PROCEDURE — 83735 ASSAY OF MAGNESIUM: CPT

## 2023-01-03 RX ORDER — NITROFURANTOIN MACROCRYSTAL 50 MG
0 CAPSULE ORAL
Qty: 0 | Refills: 0 | DISCHARGE

## 2023-01-03 RX ORDER — OXYCODONE HYDROCHLORIDE 5 MG/1
10 TABLET ORAL ONCE
Refills: 0 | Status: DISCONTINUED | OUTPATIENT
Start: 2023-01-03 | End: 2023-01-03

## 2023-01-03 RX ORDER — ROSUVASTATIN CALCIUM 5 MG/1
1 TABLET ORAL
Qty: 0 | Refills: 0 | DISCHARGE

## 2023-01-03 RX ORDER — ESZOPICLONE 2 MG/1
1 TABLET, COATED ORAL
Qty: 0 | Refills: 0 | DISCHARGE

## 2023-01-03 RX ORDER — FLUOXETINE HCL 10 MG
20 CAPSULE ORAL DAILY
Refills: 0 | Status: DISCONTINUED | OUTPATIENT
Start: 2023-01-03 | End: 2023-01-04

## 2023-01-03 RX ORDER — ONDANSETRON 8 MG/1
4 TABLET, FILM COATED ORAL EVERY 8 HOURS
Refills: 0 | Status: DISCONTINUED | OUTPATIENT
Start: 2023-01-03 | End: 2023-01-04

## 2023-01-03 RX ORDER — NITROFURANTOIN MACROCRYSTAL 50 MG
100 CAPSULE ORAL ONCE
Refills: 0 | Status: COMPLETED | OUTPATIENT
Start: 2023-01-03 | End: 2023-01-03

## 2023-01-03 RX ORDER — ACETAMINOPHEN 500 MG
1000 TABLET ORAL ONCE
Refills: 0 | Status: DISCONTINUED | OUTPATIENT
Start: 2023-01-03 | End: 2023-01-04

## 2023-01-03 RX ORDER — DEXTROSE MONOHYDRATE, SODIUM CHLORIDE, AND POTASSIUM CHLORIDE 50; .745; 4.5 G/1000ML; G/1000ML; G/1000ML
1000 INJECTION, SOLUTION INTRAVENOUS
Refills: 0 | Status: DISCONTINUED | OUTPATIENT
Start: 2023-01-04 | End: 2023-01-04

## 2023-01-03 RX ORDER — INFLUENZA VIRUS VACCINE 15; 15; 15; 15 UG/.5ML; UG/.5ML; UG/.5ML; UG/.5ML
0.5 SUSPENSION INTRAMUSCULAR ONCE
Refills: 0 | Status: COMPLETED | OUTPATIENT
Start: 2023-01-03 | End: 2023-01-04

## 2023-01-03 RX ORDER — ROSUVASTATIN CALCIUM 5 MG/1
0 TABLET ORAL
Qty: 0 | Refills: 0 | DISCHARGE

## 2023-01-03 RX ORDER — OXYCODONE HYDROCHLORIDE 5 MG/1
10 TABLET ORAL EVERY 6 HOURS
Refills: 0 | Status: DISCONTINUED | OUTPATIENT
Start: 2023-01-03 | End: 2023-01-04

## 2023-01-03 RX ORDER — FLUOXETINE HCL 10 MG
0 CAPSULE ORAL
Qty: 0 | Refills: 0 | DISCHARGE

## 2023-01-03 RX ORDER — NEBIVOLOL HYDROCHLORIDE 5 MG/1
1 TABLET ORAL
Qty: 0 | Refills: 0 | DISCHARGE

## 2023-01-03 RX ADMIN — OXYCODONE HYDROCHLORIDE 10 MILLIGRAM(S): 5 TABLET ORAL at 15:16

## 2023-01-03 RX ADMIN — OXYCODONE HYDROCHLORIDE 10 MILLIGRAM(S): 5 TABLET ORAL at 23:10

## 2023-01-03 RX ADMIN — Medication 100 MILLIGRAM(S): at 17:53

## 2023-01-03 NOTE — H&P ADULT - NSHPLABSRESULTS_GEN_ALL_CORE
Vitals:  T(C): 37.5 (01-03 @ 20:58), Max: 37.5 (01-03 @ 20:58)  HR: 70 (01-03 @ 20:58) (65 - 83)  BP: 132/65 (01-03 @ 20:58) (113/63 - 137/52)  RR: 16 (01-03 @ 20:58) (16 - 18)  SpO2: 98% (01-03 @ 20:58) (98% - 100%)      01-03 @ 07:01  -  01-03 @ 22:28  --------------------------------------------------------  IN:  Total IN: 0 mL    OUT:    Voided (mL): 250 mL  Total OUT: 250 mL    Total NET: -250 mL          01-03 @ 15:19                    12.4  CBC: 6.14>)-------(<211                     38.4                 140 | 104 | 8    CMP:  ----------------------< 101               3.6 | 31 | 0.73                      Ca:9.0  Phos:-  Mg:-               0.3|      |28        LFTs:  ------|113|-----             -|      |-            Current Inpatient Medications:  acetaminophen   IVPB .. 1000 milliGRAM(s) IV Intermittent once  FLUoxetine 20 milliGRAM(s) Oral daily  influenza   Vaccine 0.5 milliLiter(s) IntraMuscular once  multivitamin 1 Tablet(s) Oral daily  ondansetron Injectable 4 milliGRAM(s) IV Push every 8 hours PRN

## 2023-01-03 NOTE — ED STATDOCS - ATTENDING APP SHARED VISIT CONTRIBUTION OF CARE
I, Emir Padron MD,  performed the initial face to face bedside interview with this patient regarding history of present illness, review of symptoms and relevant past medical, social and family history.  I completed an independent physical examination.  I was the initial provider who evaluated this patient.   I personally saw the patient and performed a substantive portion of the visit including all aspects of the medical decision making.  I have signed out the follow up of any pending tests (i.e. labs, radiological studies) to the [facility]OL[facility].  I have communicated the patient’s plan of care and disposition with the [facility]OL[facility].  The history, relevant review of systems, past medical and surgical history, medical decision making, and physical examination was documented by the scribe in my presence and I attest to the accuracy of the documentation.

## 2023-01-03 NOTE — ED STATDOCS - CLINICAL SUMMARY MEDICAL DECISION MAKING FREE TEXT BOX
Pt presents to the ER for admission for placement of gastric stent. Plan: pre-op labs and will contact Dr. Montes De Oca for admission. Pt presents to the ER for admission for placement of gastric stent. Plan: pre-op labs and will contact Dr. Montes De Oca for admission.    signed Yoselin Ames PA-C   Pt with vomiting and poor PO tolerance since her esophageal stent was removed by Dr Montes De Oca on 12/30 (placed in November). Pt s/p gastric bypass with Dr Horan in March 2022. Admit to Dr Horan for planned replacement of stent tomorrow. Also +uti. Pt agrees with admission and plan of care.

## 2023-01-03 NOTE — ED ADULT NURSE NOTE - NSFALLRSKHARMRISK_ED_ALL_ED
Denies pain. Feels ready for discharge. Up with assist with walker. Discharge instructions reviewed with patient and SO.   no

## 2023-01-03 NOTE — H&P ADULT - NSHPPHYSICALEXAM_GEN_ALL_CORE
PHYSICAL EXAM:  - GENERAL: Alert and oriented x 3. No acute distress.    - EYES: EOMI. Anicteric.  - HENT: Moist mucous membranes. No scleral icterus. No cervical lymphadenopathy.  - LUNGS: Clear to auscultation bilaterally. No accessory muscle use.  - CARDIOVASCULAR: Regular rate and rhythm. No murmur. No JVD.  - ABDOMEN: Soft, non-tender and non-distended in all quadrants. No palpable masses.  - EXTREMITIES: No edema. Non-tender.  - SKIN: No rashes or lesions. Warm.  - NEUROLOGIC: No focal neurological deficits. CN II-XII grossly intact, but not individually tested.  - PSYCHIATRIC: Cooperative. Appropriate mood and affect.

## 2023-01-03 NOTE — H&P ADULT - NSHPREVIEWOFSYSTEMS_GEN_ALL_CORE
ROS:  - CONSTITUTIONAL:  fever and chills.  - HEENT: Denies changes in vision and hearing.  - RESPIRATORY: Denies SOB and cough.  - CV: Denies palpitations and CP.  - GI: Admits hard time swallowing foods. Denies abdominal pain, nausea, and diarrhea.  - : Denies dysuria and urinary frequency.  - MSK: Denies myalgia and joint pain.  - SKIN: Denies rash and pruritus.  - NEUROLOGICAL: Denies headache and syncope.  - PSYCHIATRIC: Denies recent changes in mood. Denies anxiety and depression.

## 2023-01-03 NOTE — CONSULT NOTE ADULT - SUBJECTIVE AND OBJECTIVE BOX
Patient is a 57y old  Female who presents with a chief complaint of     HPI:      PAST MEDICAL & SURGICAL HISTORY:  Ahn esophagus      Hypertension      Hyperlipidemia      UTI (urinary tract infection)  frequent      Stress incontinence, female      Acid reflux      Irritable bowel syndrome with both constipation and diarrhea      Rectocele  History of 2015      Injury to sacral nerve root, subsequent encounter      Hemorrhoids, unspecified hemorrhoid type      Hepatic adenoma  History of. Surgically removed 2006      Incontinence of feces, unspecified fecal incontinence type      Pelvic floor dysfunction      Pudendal neuralgia      Obesity      Chronic UTI      Thyroid cyst      Constipation      Hormone replacement therapy      Torn meniscus  left      Vitamin D deficiency      Endometriosis      Herniated thoracic disc without myelopathy      S/P exploratory laparotomy  cholecystectomy, ROZINA, endometriosis      S/P hysterectomy with oophorectomy  with mesh for stress incontinence      Stress incontinence, female  right side of mesh &quot;cut&quot; adjusted      S/P Nissen fundoplication (with gastrostomy tube placement)  for barrets esophagus 2006      Hepatic adenoma  resected 2006      S/P tonsillectomy  1979      S/P appendectomy  1979      Tendon rupture, post-op  repaired right elbow 2011      Traumatic rupture of plantar fascia of left foot, initial encounter  surgery      S/P LASIK surgery of both eyes  2000      S/P colonoscopy  multiple      History of rectal surgery  2013 interstimulator for rectal incontinence  later removed 2016      Cystocele with rectocele  5/11/2017      Rectovaginal fistula  surgery - 07/2017      Elective surgery  removal of hepatic adenoma 2006      S/P ORIF (open reduction internal fixation) fracture  left foot 2016      History of bilateral breast reduction surgery      H/O hernia repair      History of cholecystectomy      History of vaginal surgery      H/O endoscopy      H/O elbow surgery      S/P foot surgery, right          MEDICATIONS  (STANDING):    MEDICATIONS  (PRN):      Allergies    cefotetan (Other)  cephalosporins (Other)  penicillins (Other)  sulfa drugs (Anaphylaxis)    Intolerances        SOCIAL HISTORY:    FAMILY HISTORY:  Family history of heart attack (Father)  Father    Family history of hypertension (Father, Mother)  mother and father    Family history of uterine cancer (Mother)  mother    FHx: bladder cancer (Sibling)  brother passed at 59 y/o        REVIEW OF SYSTEMS:    CONSTITUTIONAL: No weakness, fevers or chills  EYES/ENT: No visual changes;  No vertigo or throat pain   NECK: No pain or stiffness  RESPIRATORY: No cough, wheezing, hemoptysis; No shortness of breath  CARDIOVASCULAR: No chest pain or palpitations  GASTROINTESTINAL: No abdominal or epigastric pain. No nausea, vomiting, or hematemesis; No diarrhea or constipation. No melena or hematochezia.  GENITOURINARY: No dysuria, frequency or hematuria  NEUROLOGICAL: No numbness or weakness  SKIN: No itching, burning, rashes, or lesions   PSYCH: Normal mood and affect  All other review of systems is negative unless indicated above.    Vital Signs Last 24 Hrs  T(C): 36.8 (03 Jan 2023 16:16), Max: 36.9 (03 Jan 2023 14:21)  T(F): 98.3 (03 Jan 2023 16:16), Max: 98.4 (03 Jan 2023 14:21)  HR: 65 (03 Jan 2023 16:16) (65 - 83)  BP: 118/62 (03 Jan 2023 16:16) (118/62 - 137/52)  BP(mean): 75 (03 Jan 2023 16:16) (75 - 75)  RR: 18 (03 Jan 2023 16:16) (17 - 18)  SpO2: 100% (03 Jan 2023 16:16) (100% - 100%)    Parameters below as of 03 Jan 2023 16:16  Patient On (Oxygen Delivery Method): room air        PHYSICAL EXAM:    Constitutional: No acute distress, well-developed, non-toxic appearing  HEENT: masked, good phonation, not icteric  Neck: supple, no lymphadenopathy  Respiratory: clear to ascultation bilaterally, no wheezing  Cardiovascular: S1 and S2, regular rate and rhythm, no murmurs rubs or gallops  Gastrointestinal: soft, non-tender, non-distended, +bowel sounds, no rebound or guarding, no surgical scars, no drains  Extremities: No peripheral edema, no cyanosis or clubbing  Vascular: 2+ peripheral pulses, no venous stasis  Neurological: A/O x 3, no focal deficits, no asterixis  Psychiatric: Normal mood, normal affect  Skin: No rashes, not jaundiced    LABS:                        12.4   6.14  )-----------( 211      ( 03 Jan 2023 15:19 )             38.4     01-03    140  |  104  |  8   ----------------------------<  101<H>  3.6   |  31  |  0.73    Ca    9.0      03 Jan 2023 15:19    TPro  7.3  /  Alb  3.4  /  TBili  0.3  /  DBili  x   /  AST  28  /  ALT  23  /  AlkPhos  113  01-03    PT/INR - ( 03 Jan 2023 15:19 )   PT: 12.1 sec;   INR: 1.04 ratio         PTT - ( 03 Jan 2023 15:19 )  PTT:33.3 sec  LIVER FUNCTIONS - ( 03 Jan 2023 15:19 )  Alb: 3.4 g/dL / Pro: 7.3 gm/dL / ALK PHOS: 113 U/L / ALT: 23 U/L / AST: 28 U/L / GGT: x             RADIOLOGY & ADDITIONAL STUDIES: Patient is a 57y old  Female who presents with a chief complaint of nausea and vomiting    57 year old woman with RYGB with anastomotic stricture, s/p palcement of Axios stent with recent removal, with now inability to tolerate PO.     Patient states that she was feeling well after the stent was removed this past friday. On saturday/sunday began to have difficulty tolerating PO. Was vomiting solid food, undigested (and this was food she was able to tolerate with the stent in place). This continued over the next few days and she even had trouble keeping liquids down at work as well. Associated with belching. These were the same symptoms she had prior to stent placement. She denies any abdominal pain. No sick contacts or travel history. No eating undercooked food or raw food.       PAST MEDICAL & SURGICAL HISTORY:  Ahn esophagus      Hypertension      Hyperlipidemia      UTI (urinary tract infection)  frequent      Stress incontinence, female      Acid reflux      Irritable bowel syndrome with both constipation and diarrhea      Rectocele  History of 2015      Injury to sacral nerve root, subsequent encounter      Hemorrhoids, unspecified hemorrhoid type      Hepatic adenoma  History of. Surgically removed 2006      Incontinence of feces, unspecified fecal incontinence type      Pelvic floor dysfunction      Pudendal neuralgia      Obesity      Chronic UTI      Thyroid cyst      Constipation      Hormone replacement therapy      Torn meniscus  left      Vitamin D deficiency      Endometriosis      Herniated thoracic disc without myelopathy      S/P exploratory laparotomy  cholecystectomy, ROZINA, endometriosis      S/P hysterectomy with oophorectomy  with mesh for stress incontinence      Stress incontinence, female  right side of mesh &quot;cut&quot; adjusted      S/P Nissen fundoplication (with gastrostomy tube placement)  for barrets esophagus 2006      Hepatic adenoma  resected 2006      S/P tonsillectomy  1979      S/P appendectomy  1979      Tendon rupture, post-op  repaired right elbow 2011      Traumatic rupture of plantar fascia of left foot, initial encounter  surgery      S/P LASIK surgery of both eyes  2000      S/P colonoscopy  multiple      History of rectal surgery  2013 interstimulator for rectal incontinence  later removed 2016      Cystocele with rectocele  5/11/2017      Rectovaginal fistula  surgery - 07/2017      Elective surgery  removal of hepatic adenoma 2006      S/P ORIF (open reduction internal fixation) fracture  left foot 2016      History of bilateral breast reduction surgery      H/O hernia repair      History of cholecystectomy      History of vaginal surgery      H/O endoscopy      H/O elbow surgery      S/P foot surgery, right          MEDICATIONS  (STANDING):    MEDICATIONS  (PRN):      Allergies    cefotetan (Other)  cephalosporins (Other)  penicillins (Other)  sulfa drugs (Anaphylaxis)    Intolerances        SOCIAL HISTORY:    FAMILY HISTORY:  Family history of heart attack (Father)  Father    Family history of hypertension (Father, Mother)  mother and father    Family history of uterine cancer (Mother)  mother    FHx: bladder cancer (Sibling)  brother passed at 57 y/o        REVIEW OF SYSTEMS:    CONSTITUTIONAL: No weakness, fevers or chills  EYES/ENT: No visual changes;  No vertigo or throat pain   NECK: No pain or stiffness  RESPIRATORY: No cough, wheezing, hemoptysis; No shortness of breath  CARDIOVASCULAR: No chest pain or palpitations  GASTROINTESTINAL: No abdominal or epigastric pain. No nausea, vomiting, or hematemesis; No diarrhea or constipation. No melena or hematochezia.  GENITOURINARY: No dysuria, frequency or hematuria  NEUROLOGICAL: No numbness or weakness  SKIN: No itching, burning, rashes, or lesions   PSYCH: Normal mood and affect  All other review of systems is negative unless indicated above.    Vital Signs Last 24 Hrs  T(C): 36.8 (03 Jan 2023 16:16), Max: 36.9 (03 Jan 2023 14:21)  T(F): 98.3 (03 Jan 2023 16:16), Max: 98.4 (03 Jan 2023 14:21)  HR: 65 (03 Jan 2023 16:16) (65 - 83)  BP: 118/62 (03 Jan 2023 16:16) (118/62 - 137/52)  BP(mean): 75 (03 Jan 2023 16:16) (75 - 75)  RR: 18 (03 Jan 2023 16:16) (17 - 18)  SpO2: 100% (03 Jan 2023 16:16) (100% - 100%)    Parameters below as of 03 Jan 2023 16:16  Patient On (Oxygen Delivery Method): room air        PHYSICAL EXAM:    Constitutional: No acute distress, well-developed, non-toxic appearing  HEENT: masked, good phonation, not icteric  Neck: supple, no lymphadenopathy  Respiratory: clear to ascultation bilaterally, no wheezing  Cardiovascular: S1 and S2, regular rate and rhythm, no murmurs rubs or gallops  Gastrointestinal: soft, non-tender, non-distended, +bowel sounds, no rebound or guarding, no surgical scars, no drains  Extremities: No peripheral edema, no cyanosis or clubbing  Vascular: 2+ peripheral pulses, no venous stasis  Neurological: A/O x 3, no focal deficits, no asterixis  Psychiatric: Normal mood, normal affect  Skin: No rashes, not jaundiced    LABS:                        12.4   6.14  )-----------( 211      ( 03 Jan 2023 15:19 )             38.4     01-03    140  |  104  |  8   ----------------------------<  101<H>  3.6   |  31  |  0.73    Ca    9.0      03 Jan 2023 15:19    TPro  7.3  /  Alb  3.4  /  TBili  0.3  /  DBili  x   /  AST  28  /  ALT  23  /  AlkPhos  113  01-03    PT/INR - ( 03 Jan 2023 15:19 )   PT: 12.1 sec;   INR: 1.04 ratio         PTT - ( 03 Jan 2023 15:19 )  PTT:33.3 sec  LIVER FUNCTIONS - ( 03 Jan 2023 15:19 )  Alb: 3.4 g/dL / Pro: 7.3 gm/dL / ALK PHOS: 113 U/L / ALT: 23 U/L / AST: 28 U/L / GGT: x             RADIOLOGY & ADDITIONAL STUDIES: Patient is a 57y old  Female who presents with a chief complaint of nausea and vomiting    57 year old woman with RYGB with anastomotic stricture, s/p palcement of Axios stent with recent removal, with now inability to tolerate PO.     Patient states that she was feeling well after the stent was removed this past friday. On saturday/sunday began to have difficulty tolerating PO. Was vomiting solid food, undigested (and this was food she was able to tolerate with the stent in place). This continued over the next few days and she even had trouble keeping liquids down at work as well. Associated with belching. These were the same symptoms she had prior to stent placement. She denies any abdominal pain. No sick contacts or travel history. No eating undercooked food or raw food. No overt signs of GI bleeding like hematemesis, melena, hematochezia. No diarrhea. No chest pain or dizziness.       PAST MEDICAL & SURGICAL HISTORY:  Ahn esophagus      Hypertension      Hyperlipidemia      UTI (urinary tract infection)  frequent      Stress incontinence, female      Acid reflux      Irritable bowel syndrome with both constipation and diarrhea      Rectocele  History of 2015      Injury to sacral nerve root, subsequent encounter      Hemorrhoids, unspecified hemorrhoid type      Hepatic adenoma  History of. Surgically removed 2006      Incontinence of feces, unspecified fecal incontinence type      Pelvic floor dysfunction      Pudendal neuralgia      Obesity      Chronic UTI      Thyroid cyst      Constipation      Hormone replacement therapy      Torn meniscus  left      Vitamin D deficiency      Endometriosis      Herniated thoracic disc without myelopathy      S/P exploratory laparotomy  cholecystectomy, ROZINA, endometriosis      S/P hysterectomy with oophorectomy  with mesh for stress incontinence      Stress incontinence, female  right side of mesh adjusted      S/P Nissen fundoplication (with gastrostomy tube placement)  for barrets esophagus 2006      Hepatic adenoma  resected 2006      S/P tonsillectomy  1979      S/P appendectomy  1979      Tendon rupture, post-op  repaired right elbow 2011      Traumatic rupture of plantar fascia of left foot, initial encounter  surgery      S/P LASIK surgery of both eyes  2000      S/P colonoscopy  multiple      History of rectal surgery  2013 interstimulator for rectal incontinence  later removed 2016      Cystocele with rectocele  5/11/2017      Rectovaginal fistula  surgery - 07/2017      Elective surgery  removal of hepatic adenoma 2006      S/P ORIF (open reduction internal fixation) fracture  left foot 2016      History of bilateral breast reduction surgery      H/O hernia repair      History of cholecystectomy      History of vaginal surgery      H/O endoscopy      H/O elbow surgery      S/P foot surgery, right          MEDICATIONS  (STANDING):  reviwed, zofran 4mg IV prn nausea          Allergies    cefotetan (Other)  cephalosporins (Other)  penicillins (Other)  sulfa drugs (Anaphylaxis)    Intolerances        SOCIAL HISTORY:  no smoking, drinking or drugs    FAMILY HISTORY:  Family history of heart attack (Father)  Father    Family history of hypertension (Father, Mother)  mother and father    Family history of uterine cancer (Mother)  mother    FHx: bladder cancer (Sibling)  brother passed at 59 y/o    no colon or stomach cancer    REVIEW OF SYSTEMS:    CONSTITUTIONAL: No weakness, fevers or chills  EYES/ENT: No visual changes;  No vertigo or throat pain   NECK: No pain or stiffness  RESPIRATORY: No cough, wheezing, hemoptysis; No shortness of breath  CARDIOVASCULAR: No chest pain or palpitations  GASTROINTESTINAL: see HPI  GENITOURINARY: No dysuria, frequency or hematuria  NEUROLOGICAL: No numbness or weakness  SKIN: No itching, burning, rashes, or lesions   PSYCH: Normal mood and affect  All other review of systems is negative unless indicated above.    Vital Signs Last 24 Hrs  T(C): 36.8 (03 Jan 2023 16:16), Max: 36.9 (03 Jan 2023 14:21)  T(F): 98.3 (03 Jan 2023 16:16), Max: 98.4 (03 Jan 2023 14:21)  HR: 65 (03 Jan 2023 16:16) (65 - 83)  BP: 118/62 (03 Jan 2023 16:16) (118/62 - 137/52)  BP(mean): 75 (03 Jan 2023 16:16) (75 - 75)  RR: 18 (03 Jan 2023 16:16) (17 - 18)  SpO2: 100% (03 Jan 2023 16:16) (100% - 100%)    Parameters below as of 03 Jan 2023 16:16  Patient On (Oxygen Delivery Method): room air        PHYSICAL EXAM:    Constitutional: No acute distress, well-developed, non-toxic appearing  HEENT: masked, good phonation, not icteric  Neck: supple, no lymphadenopathy  Respiratory: clear to ascultation bilaterally, no wheezing  Cardiovascular: S1 and S2, regular rate and rhythm, no murmurs rubs or gallops  Gastrointestinal: soft, non-tender, non-distended, +bowel sounds, no rebound or guarding, + surgical scars, no drains  Extremities: No peripheral edema, no cyanosis or clubbing  Vascular: 2+ peripheral pulses, no venous stasis  Neurological: A/O x 3, no focal deficits, no asterixis  Psychiatric: Normal mood, normal affect  Skin: No rashes, not jaundiced    LABS:                        12.4   6.14  )-----------( 211      ( 03 Jan 2023 15:19 )             38.4     01-03    140  |  104  |  8   ----------------------------<  101<H>  3.6   |  31  |  0.73    Ca    9.0      03 Jan 2023 15:19    TPro  7.3  /  Alb  3.4  /  TBili  0.3  /  DBili  x   /  AST  28  /  ALT  23  /  AlkPhos  113  01-03    PT/INR - ( 03 Jan 2023 15:19 )   PT: 12.1 sec;   INR: 1.04 ratio         PTT - ( 03 Jan 2023 15:19 )  PTT:33.3 sec  LIVER FUNCTIONS - ( 03 Jan 2023 15:19 )  Alb: 3.4 g/dL / Pro: 7.3 gm/dL / ALK PHOS: 113 U/L / ALT: 23 U/L / AST: 28 U/L / GGT: x             RADIOLOGY & ADDITIONAL STUDIES:

## 2023-01-03 NOTE — PATIENT PROFILE ADULT - MEDICATIONS/VISITS
Reviewed today's consult and answered patient's questions.  Patient verbalized understanding and agreed to call me with any questions / concerns in the future and confirmed having our contact information.  Please see patient instructions below.      no

## 2023-01-03 NOTE — ED STATDOCS - OBJECTIVE STATEMENT
58 y/o female with PMHx of GERD, Ahn esophagus, constipation, UTI, endometriosis, HTN, HLD SIB by Dr. Montes De Oca for surgery tomorrow to have a stomach stent put back in that was removed on 12/30. Pt states she has been vomiting since having the stent removed. Pt is allergic to penacillin, cephalosporins and sulfa. Pt has a history of hysterectomy.

## 2023-01-03 NOTE — CONSULT NOTE ADULT - ASSESSMENT
full note to follow 57 year old woman with RYGB with anastomotic stricture, s/p palcement of Axios stent with recent removal, with now inability to tolerate PO.     Plan for EGD to evaluate for ulcer, re-stricturing.
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
## 2023-01-03 NOTE — H&P ADULT - NSICDXPASTSURGICALHX_GEN_ALL_CORE_FT
PAST SURGICAL HISTORY:  Cystocele with rectocele 5/11/2017    Elective surgery removal of hepatic adenoma 2006    H/O elbow surgery     H/O endoscopy     H/O gastric bypass 3/2022    H/O hernia repair     Hepatic adenoma resected 2006    History of bilateral breast reduction surgery     History of cholecystectomy     History of rectal surgery 2013 interstimulator for rectal incontinence  later removed 2016    History of vaginal surgery     Rectovaginal fistula surgery - 07/2017    S/P appendectomy 1979    S/P colonoscopy multiple    S/P exploratory laparotomy cholecystectomy, ROZINA, endometriosis    S/P foot surgery, right     S/P hysterectomy with oophorectomy with mesh for stress incontinence    S/P LASIK surgery of both eyes 2000    S/P Nissen fundoplication (with gastrostomy tube placement) for barrets esophagus 2006    S/P ORIF (open reduction internal fixation) fracture left foot 2016    S/P tonsillectomy 1979    Stress incontinence, female right side of mesh "cut" adjusted    Tendon rupture, post-op repaired right elbow 2011    Traumatic rupture of plantar fascia of left foot, initial encounter surgery

## 2023-01-03 NOTE — ED ADULT TRIAGE NOTE - CHIEF COMPLAINT QUOTE
PT SENT FROM MD [facility] FOR SURGERY TOMORROW.  PT REPORTS STOMACH STENT REMOVED ON FRIDAY 12/30/22 AND HAVE BEEN THROWING UP.  PMH GASTRIC BYPASS SURGERY 8 MONTHS AGO AND DEVELOPED STOMACH STENOSIS.

## 2023-01-03 NOTE — ED ADULT NURSE NOTE - OBJECTIVE STATEMENT
Patient Patient is a 57y female ambulatory to the ED c/o vomiting. Patient had a gastric stent for severe stenosis removed on Friday at , began vomiting on Saturday. Denies chest pain, SOB, diarrhea, fever, chills. Instructed by MD [facility] to return to ED for surgical evaluation. Currently endorsing abdominal pain.

## 2023-01-03 NOTE — H&P ADULT - HISTORY OF PRESENT ILLNESS
Ms. Miranda is a 57 year-old lady with PSHx of gastric bypass on 3/7/22, complicated with anastomotic stricture, s.p esophageal stent on 11/07/22, then stent removal on 12/30/22, presented to the ED with episodes of dysphagia with fluid regurgitation after stent removal. She presented to Dr. Montes De Oca's office and was recommended to present to the ED for tomorrow's EGD with possible endoscopic intervention. She has no other complaints besides dysphagia. She denies any fever, chills, n/v/d/c at this time

## 2023-01-03 NOTE — PATIENT PROFILE ADULT - NSPRESCRALCSIXMORE_GEN_A_NUR
Date of Progress Note:  09/08/2018



Chief Complaint:  End-stage renal disease on dialysis.



Interval History:  The patient presented to the hospital because of generalized 
weakness, was found to have metabolic acidosis, hyperkalemia of mild degree.  
He underwent dialysis in ICU, metabolic acidosis stabilized and sodium 
bicarbonate drip was completed. 



Azotemia remains in good control.  The patient was found to have hypokalemia 
and hypophosphatemia today and received replacement with potassium phosphate 
infusion and potassium phosphate p.o. replacement. 



Phosphorus level and potassium levels have improved and remained within normal 
limits today. 



Hypoglycemia.  The patient was taken off diabetic medication.  Blood glucose 
stabilized.  Mental status changes are gradually resolving.



Review of Systems:

Denies fever, chills.



Physical Examination:

Lungs:  Clear to auscultation bilaterally. 

Heart:  S1, S2. 

Abdomen:  Soft, benign. 

Extremities:  Minimal edema.



Impression And Plan:  

1.   Mild fluid overload.  Dialysis will be scheduled tomorrow with 
ultrafiltration.

2.   Hypophosphatemia, treated.  Monitor phosphorus level.

3.   Hypokalemia.  Replacement was ordered and potassium level improved.

4.   Hypertension.  Monitor blood pressure closely.  Adjust blood pressure 
medication as needed.

5.   Diabetes mellitus.  Currently blood glucose is controlled.  The patient is 
on diabetic diet and received a drip with dextrose for hypoglycemia.  
Hypoglycemia resolved.  The patient was taken off diabetic medication by mouth.
  He is not a candidate for p.o. 

diabetic medication because of risk of hypoglycemia and lactic acidosis.

I spent total 36 min including 26 min to coordinate care plan.



JOELLEN/YOLA

DD:  09/08/2018 20:54:07   Voice ID:  [id number]

DT:  09/08/2018 21:32:22   Report ID:  [id number]

STEPAN Never

## 2023-01-03 NOTE — H&P ADULT - NSHPADDITIONALINFOADULT_GEN_ALL_CORE
a 57 year-old lady with PSHx of gastric bypass on 3/7/22, complicated with anastomotic stricture, s.p esophageal stent on 11/07/22, then stent removal on 12/30/22, presented to the ED with episodes of dysphagia with fluid regurgitation after stent removal.     Plan:  - Admit to surgical service under Dr. Horan  - CLD for now, NPO after midnight  - IVF: D5 1/2NS 20KCL @ 125  - preop for tomorrow's EGD by Dr. Montes De Oca  - pain/nausea control PRN      FENGI:  - Diet: CLD for now, NPO after midnight  - Zofran 4mg  - Replete electrolytes as needed    Plan discussed with Dr. Horan

## 2023-01-03 NOTE — PATIENT PROFILE ADULT - FALL HARM RISK - UNIVERSAL INTERVENTIONS
Bed in lowest position, wheels locked, appropriate side rails in place/Call bell, personal items and telephone in reach/Instruct patient to call for assistance before getting out of bed or chair/Non-slip footwear when patient is out of bed/[place] to call system/Physically safe environment - no spills, clutter or unnecessary equipment/Purposeful Proactive Rounding/Room/bathroom lighting operational, light cord in reach

## 2023-01-03 NOTE — ED STATDOCS - PROGRESS NOTE DETAILS
signed Yoselin Ames PA-C   I spoke to Marielle Horan and Tavon. May admit to Dr Horan, plan for Dr Montes De Oca to scope and replace esophageal stent tomorrow. No CT at this time. No significant findings on labwork. pt with +uti and noted some urinary symptoms. Notes macrobid usually works well for her. Pt agrees with admission and plan of care. I spoke to sx resident who will see pt in ED for admission.

## 2023-01-04 ENCOUNTER — TRANSCRIPTION ENCOUNTER (OUTPATIENT)
Age: 58
End: 2023-01-04

## 2023-01-04 VITALS
TEMPERATURE: 99 F | RESPIRATION RATE: 16 BRPM | DIASTOLIC BLOOD PRESSURE: 60 MMHG | SYSTOLIC BLOOD PRESSURE: 120 MMHG | OXYGEN SATURATION: 100 % | HEART RATE: 68 BPM

## 2023-01-04 LAB
ANION GAP SERPL CALC-SCNC: 3 MMOL/L — LOW (ref 5–17)
BUN SERPL-MCNC: 5 MG/DL — LOW (ref 7–23)
CALCIUM SERPL-MCNC: 8.8 MG/DL — SIGNIFICANT CHANGE UP (ref 8.5–10.1)
CHLORIDE SERPL-SCNC: 107 MMOL/L — SIGNIFICANT CHANGE UP (ref 96–108)
CO2 SERPL-SCNC: 32 MMOL/L — HIGH (ref 22–31)
CREAT SERPL-MCNC: 0.69 MG/DL — SIGNIFICANT CHANGE UP (ref 0.5–1.3)
EGFR: 102 ML/MIN/1.73M2 — SIGNIFICANT CHANGE UP
GLUCOSE SERPL-MCNC: 93 MG/DL — SIGNIFICANT CHANGE UP (ref 70–99)
HCT VFR BLD CALC: 37.4 % — SIGNIFICANT CHANGE UP (ref 34.5–45)
HGB BLD-MCNC: 12 G/DL — SIGNIFICANT CHANGE UP (ref 11.5–15.5)
INR BLD: 1.13 RATIO — SIGNIFICANT CHANGE UP (ref 0.88–1.16)
MAGNESIUM SERPL-MCNC: 2.2 MG/DL — SIGNIFICANT CHANGE UP (ref 1.6–2.6)
MCHC RBC-ENTMCNC: 29.4 PG — SIGNIFICANT CHANGE UP (ref 27–34)
MCHC RBC-ENTMCNC: 32.1 GM/DL — SIGNIFICANT CHANGE UP (ref 32–36)
MCV RBC AUTO: 91.7 FL — SIGNIFICANT CHANGE UP (ref 80–100)
PHOSPHATE SERPL-MCNC: 3.7 MG/DL — SIGNIFICANT CHANGE UP (ref 2.5–4.5)
PLATELET # BLD AUTO: 192 K/UL — SIGNIFICANT CHANGE UP (ref 150–400)
POTASSIUM SERPL-MCNC: 4.4 MMOL/L — SIGNIFICANT CHANGE UP (ref 3.5–5.3)
POTASSIUM SERPL-SCNC: 4.4 MMOL/L — SIGNIFICANT CHANGE UP (ref 3.5–5.3)
PROTHROM AB SERPL-ACNC: 13.1 SEC — SIGNIFICANT CHANGE UP (ref 10.5–13.4)
RBC # BLD: 4.08 M/UL — SIGNIFICANT CHANGE UP (ref 3.8–5.2)
RBC # FLD: 13.1 % — SIGNIFICANT CHANGE UP (ref 10.3–14.5)
SODIUM SERPL-SCNC: 142 MMOL/L — SIGNIFICANT CHANGE UP (ref 135–145)
WBC # BLD: 4.94 K/UL — SIGNIFICANT CHANGE UP (ref 3.8–10.5)
WBC # FLD AUTO: 4.94 K/UL — SIGNIFICANT CHANGE UP (ref 3.8–10.5)

## 2023-01-04 PROCEDURE — 43266 EGD ENDOSCOPIC STENT PLACE: CPT

## 2023-01-04 RX ORDER — ERGOCALCIFEROL 1.25 MG/1
0 CAPSULE ORAL
Qty: 0 | Refills: 0 | DISCHARGE

## 2023-01-04 RX ADMIN — OXYCODONE HYDROCHLORIDE 10 MILLIGRAM(S): 5 TABLET ORAL at 00:00

## 2023-01-04 RX ADMIN — OXYCODONE HYDROCHLORIDE 10 MILLIGRAM(S): 5 TABLET ORAL at 13:27

## 2023-01-04 RX ADMIN — Medication 20 MILLIGRAM(S): at 16:29

## 2023-01-04 RX ADMIN — OXYCODONE HYDROCHLORIDE 10 MILLIGRAM(S): 5 TABLET ORAL at 13:57

## 2023-01-04 RX ADMIN — DEXTROSE MONOHYDRATE, SODIUM CHLORIDE, AND POTASSIUM CHLORIDE 100 MILLILITER(S): 50; .745; 4.5 INJECTION, SOLUTION INTRAVENOUS at 11:47

## 2023-01-04 RX ADMIN — Medication 1 TABLET(S): at 16:28

## 2023-01-04 RX ADMIN — OXYCODONE HYDROCHLORIDE 10 MILLIGRAM(S): 5 TABLET ORAL at 08:26

## 2023-01-04 RX ADMIN — OXYCODONE HYDROCHLORIDE 10 MILLIGRAM(S): 5 TABLET ORAL at 08:56

## 2023-01-04 RX ADMIN — INFLUENZA VIRUS VACCINE 0.5 MILLILITER(S): 15; 15; 15; 15 SUSPENSION INTRAMUSCULAR at 16:37

## 2023-01-04 NOTE — PROGRESS NOTE ADULT - SUBJECTIVE AND OBJECTIVE BOX
Patient was seen and examined by surgical team this morning. Patient feels well this morning, ready to have endoscopy today. Patient has no other complaints at this time. no acute overnight events.      Physical exam:  - GENERAL: Alert and oriented x 3. No acute distress.    - EYES: EOMI. Anicteric.  - HENT: Moist mucous membranes. No scleral icterus. No cervical lymphadenopathy.  - LUNGS: Clear to auscultation bilaterally. No accessory muscle use.  - CARDIOVASCULAR: Regular rate and rhythm. No murmur. No JVD.  - ABDOMEN: Soft, non-tender and non-distended in all quadrants. No palpable masses.  - EXTREMITIES: No edema. Non-tender.  - SKIN: No rashes or lesions. Warm.  - NEUROLOGIC: No focal neurological deficits. CN II-XII grossly intact, but not individually tested.  - PSYCHIATRIC: Cooperative. Appropriate mood and affect.              Vitals:  T(C): 36.5 (01-04 @ 08:26), Max: 37.5 (01-03 @ 20:58)  HR: 68 (01-04 @ 08:26) (65 - 83)  BP: 125/55 (01-04 @ 08:26) (113/63 - 137/52)  RR: 16 (01-04 @ 08:26) (16 - 18)  SpO2: 100% (01-04 @ 08:26) (98% - 100%)      01-03 @ 07:01  -  01-04 @ 07:00  --------------------------------------------------------  IN:    dextrose 5% + sodium chloride 0.45% w/ Additives: 700 mL  Total IN: 700 mL    OUT:    Voided (mL): 1050 mL  Total OUT: 1050 mL    Total NET: -350 mL      01-04 @ 07:01  -  01-04 @ 09:33  --------------------------------------------------------  IN:    dextrose 5% + sodium chloride 0.45% w/ Additives: 200 mL  Total IN: 200 mL    OUT:  Total OUT: 0 mL    Total NET: 200 mL          01-03 @ 15:19                    12.4  CBC: 6.14>)-------(<211                     38.4                 140 | 104 | 8    CMP:  ----------------------< 101               3.6 | 31 | 0.73                      Ca:9.0  Phos:-  Mg:-               0.3|      |28        LFTs:  ------|113|-----             -|      |-            Current Inpatient Medications:  acetaminophen   IVPB .. 1000 milliGRAM(s) IV Intermittent once  dextrose 5% + sodium chloride 0.45% with potassium chloride 20 mEq/L 1000 milliLiter(s) (100 mL/Hr) IV Continuous <Continuous>  FLUoxetine 20 milliGRAM(s) Oral daily  influenza   Vaccine 0.5 milliLiter(s) IntraMuscular once  multivitamin 1 Tablet(s) Oral daily  ondansetron Injectable 4 milliGRAM(s) IV Push every 8 hours PRN  oxyCODONE    IR 10 milliGRAM(s) Oral every 6 hours PRN

## 2023-01-04 NOTE — DISCHARGE NOTE PROVIDER - HOSPITAL COURSE
Ms. Miranda is a 57 year-old lady with PSHx of gastric bypass on 3/7/22, complicated with anastomotic stricture, s.p esophageal stent on 11/07/22, then stent removal on 12/30/22, presented to the ED with episodes of dysphagia with fluid regurgitation after stent removal. She presented to Dr. Montes De Oca's office and was recommended to present to the ED for tomorrow's EGD with possible endoscopic intervention. She has no other complaints besides dysphagia. She underwent EGD and replacement of stent on 1/4/23. Symptoms improved and she was stable for discharge after the procedure.

## 2023-01-04 NOTE — DISCHARGE NOTE PROVIDER - CARE PROVIDER_API CALL
Mark Horan)  Surgery  [address], [address]  [place] [address]  Phone: [phone]  Fax: [phone]  Follow Up Time:

## 2023-01-04 NOTE — DISCHARGE NOTE PROVIDER - NSDCFUADDINST_GEN_ALL_CORE_FT
PAIN: You may continue to take Acetaminophen (Tylenol) and Ibuprofen (Advil, Motrin) over the counter for pain.  NOTIFY US IF: You have any fever (over 100.4 F) or chills, persistent nausea/vomiting, persistent diarrhea, or if your pain is not controlled on your discharge pain medications.  FOLLOW-UP: Please call the office and make an appointment to follow up with Dr Montes De Oca in 1-2 weeks.  Please follow up with your primary care physician in 1-2 weeks regarding your hospitalization.

## 2023-01-04 NOTE — DISCHARGE NOTE NURSING/CASE MANAGEMENT/SOCIAL WORK - NSDCPEFALRISK_GEN_ALL_CORE
For information on Fall & Injury Prevention, visit: [email or web address] OR  [email or web address] OR  https://www.cdc.gov/steadi/patient.html

## 2023-01-04 NOTE — DISCHARGE NOTE NURSING/CASE MANAGEMENT/SOCIAL WORK - PATIENT PORTAL LINK FT
You can access the FollowMyHealth Patient Portal offered by [facility] by registering at the following website: [email or web address]. By joining [facility]’s FollowMyHealth portal, you will also be able to view your health information using other applications (apps) compatible with our system.

## 2023-01-04 NOTE — DISCHARGE NOTE PROVIDER - NSDCMRMEDTOKEN_GEN_ALL_CORE_FT
ergocalciferol 1.25 mg (50,000 intl units) oral capsule: 1 cap(s) orally once a week  estradiol 0.1 mg/g vaginal cream: 2 gram(s) vaginal 2 times a week  FLUoxetine 20 mg oral capsule: 1 cap(s) orally once a day  oxyCODONE 10 mg oral tablet: 1 tab(s) orally every 6 hours, As Needed

## 2023-01-04 NOTE — DISCHARGE NOTE NURSING/CASE MANAGEMENT/SOCIAL WORK - NSDCVIVACCINE_GEN_ALL_CORE_FT
influenza, injectable, quadrivalent, preservative free; 04-Jan-2023 16:37; Chloe Murillo (RN); [employer]; [id number] (Exp. Date: 30-Jun-2023); IntraMuscular; Deltoid Left.; 0.5 milliLiter(s); VIS (VIS Published: 06-Aug-2021, VIS Presented: 04-Jan-2023);

## 2023-01-04 NOTE — PROGRESS NOTE ADULT - ASSESSMENT
a 57 year-old lady with PSHx of gastric bypass on 3/7/22, complicated with anastomotic stricture, s.p esophageal stent on 11/07/22, then stent removal on 12/30/22, presented to the ED with episodes of dysphagia with fluid regurgitation after stent removal.     Plan:  - NPO  - IVF: D5 1/2NS 20KCL @ 125  - preopped for today's EGD by Dr. Montes De Oca  - pain/nausea control PRN      BEENAI:  - NPO  - Zofran 4mg  - Replete electrolytes as needed    Plan discussed with Dr. Horan

## 2023-01-05 DIAGNOSIS — I10 ESSENTIAL (PRIMARY) HYPERTENSION: ICD-10-CM

## 2023-01-05 DIAGNOSIS — Z88.2 ALLERGY STATUS TO SULFONAMIDES: ICD-10-CM

## 2023-01-05 DIAGNOSIS — Z46.59 ENCOUNTER FOR FITTING AND ADJUSTMENT OF OTHER GASTROINTESTINAL APPLIANCE AND DEVICE: ICD-10-CM

## 2023-01-05 DIAGNOSIS — Z90.710 ACQUIRED ABSENCE OF BOTH CERVIX AND UTERUS: ICD-10-CM

## 2023-01-05 DIAGNOSIS — Z90.49 ACQUIRED ABSENCE OF OTHER SPECIFIED PARTS OF DIGESTIVE TRACT: ICD-10-CM

## 2023-01-05 DIAGNOSIS — Z98.84 BARIATRIC SURGERY STATUS: ICD-10-CM

## 2023-01-05 DIAGNOSIS — E78.5 HYPERLIPIDEMIA, UNSPECIFIED: ICD-10-CM

## 2023-01-05 DIAGNOSIS — Z88.1 ALLERGY STATUS TO OTHER ANTIBIOTIC AGENTS STATUS: ICD-10-CM

## 2023-01-05 DIAGNOSIS — Z88.0 ALLERGY STATUS TO PENICILLIN: ICD-10-CM
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
## 2023-01-09 DIAGNOSIS — K95.89 OTHER COMPLICATIONS OF OTHER BARIATRIC PROCEDURE: ICD-10-CM

## 2023-01-09 DIAGNOSIS — R13.10 DYSPHAGIA, UNSPECIFIED: ICD-10-CM

## 2023-01-09 DIAGNOSIS — Z20.822 CONTACT WITH AND (SUSPECTED) EXPOSURE TO COVID-19: ICD-10-CM
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
## 2023-01-09 DIAGNOSIS — Z88.2 ALLERGY STATUS TO SULFONAMIDES: ICD-10-CM

## 2023-01-09 DIAGNOSIS — Z88.1 ALLERGY STATUS TO OTHER ANTIBIOTIC AGENTS STATUS: ICD-10-CM

## 2023-01-09 DIAGNOSIS — Z88.0 ALLERGY STATUS TO PENICILLIN: ICD-10-CM

## 2023-01-09 DIAGNOSIS — K83.1 OBSTRUCTION OF BILE DUCT: ICD-10-CM

## 2023-02-01 ENCOUNTER — APPOINTMENT (OUTPATIENT)
Dept: GASTROENTEROLOGY | Facility: CLINIC | Age: 58
End: 2023-02-01
Payer: COMMERCIAL

## 2023-02-01 VITALS
SYSTOLIC BLOOD PRESSURE: 129 MMHG | WEIGHT: 129 LBS | HEIGHT: 62 IN | HEART RATE: 77 BPM | BODY MASS INDEX: 23.74 KG/M2 | DIASTOLIC BLOOD PRESSURE: 74 MMHG

## 2023-02-01 DIAGNOSIS — K91.89 OTHER POSTPROCEDURAL COMPLICATIONS AND DISORDERS OF DIGESTIVE SYSTEM: ICD-10-CM

## 2023-02-01 PROCEDURE — 99214 OFFICE O/P EST MOD 30 MIN: CPT

## 2023-02-06 PROBLEM — K91.89 ANASTOMOTIC STRICTURE OF GASTROJEJUNOSTOMY: Status: ACTIVE | Noted: 2023-02-06

## 2023-02-06 NOTE — PHYSICAL EXAM
[Alert] : alert [Normal Voice/Communication] : normal voice/communication [Healthy Appearing] : healthy appearing [No Acute Distress] : no acute distress [Sclera] : the sclera and conjunctiva were normal [Hearing Threshold Finger Rub Not [place]] : hearing was normal [Normal Appearance] : the appearance of the neck was normal [No Neck Mass] : no neck mass was observed [Abdomen Tenderness] : non-tender [Abdomen Soft] : soft [Abnormal Walk] : normal gait [No Clubbing, Cyanosis] : no clubbing or cyanosis of the fingernails [Normal Color / Pigmentation] : normal skin color and pigmentation [] : no rash [Skin Lesions] : no skin lesions [No Focal Deficits] : no focal deficits [Motor Exam] : the motor exam was normal [Oriented To Time, Place, And Person] : oriented to person, place, and time

## 2023-02-06 NOTE — ASSESSMENT
[FreeTextEntry1] : 57 year old woman with obesity s/p RYGB with appropriate weight loss, with post operative anastomotic stricture, with recurrent stricturing s/p placement of 20 mm Axios stent, here for follow up. \par \par Patient doing very well. No abdominal pain or dysphagia. No nausea or vomiting. Stable weight. \par \par Will book EGD and stent removal in 6 weeks.\par

## 2023-03-07 ENCOUNTER — OUTPATIENT (OUTPATIENT)
Dept: OUTPATIENT SERVICES | Facility: HOSPITAL | Age: 58
LOS: 1 days | End: 2023-03-07
Payer: COMMERCIAL

## 2023-03-07 VITALS
TEMPERATURE: 98 F | HEART RATE: 81 BPM | OXYGEN SATURATION: 100 % | WEIGHT: 136.69 LBS | DIASTOLIC BLOOD PRESSURE: 57 MMHG | SYSTOLIC BLOOD PRESSURE: 118 MMHG | HEIGHT: 62 IN | RESPIRATION RATE: 16 BRPM

## 2023-03-07 DIAGNOSIS — Z98.890 OTHER SPECIFIED POSTPROCEDURAL STATES: Chronic | ICD-10-CM

## 2023-03-07 DIAGNOSIS — Z96.7 PRESENCE OF OTHER BONE AND TENDON IMPLANTS: Chronic | ICD-10-CM

## 2023-03-07 DIAGNOSIS — N82.3 FISTULA OF VAGINA TO LARGE INTESTINE: Chronic | ICD-10-CM

## 2023-03-07 DIAGNOSIS — S96.812A STRAIN OF OTHER SPECIFIED MUSCLES AND TENDONS AT ANKLE AND FOOT LEVEL, LEFT FOOT, INITIAL ENCOUNTER: Chronic | ICD-10-CM

## 2023-03-07 DIAGNOSIS — K22.2 ESOPHAGEAL OBSTRUCTION: ICD-10-CM

## 2023-03-07 DIAGNOSIS — Z98.84 BARIATRIC SURGERY STATUS: Chronic | ICD-10-CM

## 2023-03-07 DIAGNOSIS — Z90.49 ACQUIRED ABSENCE OF OTHER SPECIFIED PARTS OF DIGESTIVE TRACT: Chronic | ICD-10-CM

## 2023-03-07 DIAGNOSIS — Z41.9 ENCOUNTER FOR PROCEDURE FOR PURPOSES OTHER THAN REMEDYING HEALTH STATE, UNSPECIFIED: Chronic | ICD-10-CM

## 2023-03-07 DIAGNOSIS — N81.10 CYSTOCELE, UNSPECIFIED: Chronic | ICD-10-CM

## 2023-03-07 LAB
ANION GAP SERPL CALC-SCNC: 2 MMOL/L — LOW (ref 5–17)
APTT BLD: 35.3 SEC — SIGNIFICANT CHANGE UP (ref 27.5–35.5)
BASOPHILS # BLD AUTO: 0.05 K/UL — SIGNIFICANT CHANGE UP (ref 0–0.2)
BASOPHILS NFR BLD AUTO: 0.7 % — SIGNIFICANT CHANGE UP (ref 0–2)
BUN SERPL-MCNC: 11 MG/DL — SIGNIFICANT CHANGE UP (ref 7–23)
CALCIUM SERPL-MCNC: 9.3 MG/DL — SIGNIFICANT CHANGE UP (ref 8.5–10.1)
CHLORIDE SERPL-SCNC: 107 MMOL/L — SIGNIFICANT CHANGE UP (ref 96–108)
CO2 SERPL-SCNC: 30 MMOL/L — SIGNIFICANT CHANGE UP (ref 22–31)
CREAT SERPL-MCNC: 0.71 MG/DL — SIGNIFICANT CHANGE UP (ref 0.5–1.3)
EGFR: 100 ML/MIN/1.73M2 — SIGNIFICANT CHANGE UP
EOSINOPHIL # BLD AUTO: 0.1 K/UL — SIGNIFICANT CHANGE UP (ref 0–0.5)
EOSINOPHIL NFR BLD AUTO: 1.3 % — SIGNIFICANT CHANGE UP (ref 0–6)
GLUCOSE SERPL-MCNC: 93 MG/DL — SIGNIFICANT CHANGE UP (ref 70–99)
HCT VFR BLD CALC: 40 % — SIGNIFICANT CHANGE UP (ref 34.5–45)
HGB BLD-MCNC: 13 G/DL — SIGNIFICANT CHANGE UP (ref 11.5–15.5)
IMM GRANULOCYTES NFR BLD AUTO: 0.3 % — SIGNIFICANT CHANGE UP (ref 0–0.9)
INR BLD: 1.09 RATIO — SIGNIFICANT CHANGE UP (ref 0.88–1.16)
LYMPHOCYTES # BLD AUTO: 2.02 K/UL — SIGNIFICANT CHANGE UP (ref 1–3.3)
LYMPHOCYTES # BLD AUTO: 26.5 % — SIGNIFICANT CHANGE UP (ref 13–44)
MCHC RBC-ENTMCNC: 28.9 PG — SIGNIFICANT CHANGE UP (ref 27–34)
MCHC RBC-ENTMCNC: 32.5 GM/DL — SIGNIFICANT CHANGE UP (ref 32–36)
MCV RBC AUTO: 88.9 FL — SIGNIFICANT CHANGE UP (ref 80–100)
MONOCYTES # BLD AUTO: 0.53 K/UL — SIGNIFICANT CHANGE UP (ref 0–0.9)
MONOCYTES NFR BLD AUTO: 7 % — SIGNIFICANT CHANGE UP (ref 2–14)
NEUTROPHILS # BLD AUTO: 4.89 K/UL — SIGNIFICANT CHANGE UP (ref 1.8–7.4)
NEUTROPHILS NFR BLD AUTO: 64.2 % — SIGNIFICANT CHANGE UP (ref 43–77)
PLATELET # BLD AUTO: 267 K/UL — SIGNIFICANT CHANGE UP (ref 150–400)
POTASSIUM SERPL-MCNC: 3.9 MMOL/L — SIGNIFICANT CHANGE UP (ref 3.5–5.3)
POTASSIUM SERPL-SCNC: 3.9 MMOL/L — SIGNIFICANT CHANGE UP (ref 3.5–5.3)
PROTHROM AB SERPL-ACNC: 12.6 SEC — SIGNIFICANT CHANGE UP (ref 10.5–13.4)
RBC # BLD: 4.5 M/UL — SIGNIFICANT CHANGE UP (ref 3.8–5.2)
RBC # FLD: 12.5 % — SIGNIFICANT CHANGE UP (ref 10.3–14.5)
SARS-COV-2 RNA SPEC QL NAA+PROBE: SIGNIFICANT CHANGE UP
SODIUM SERPL-SCNC: 139 MMOL/L — SIGNIFICANT CHANGE UP (ref 135–145)
WBC # BLD: 7.61 K/UL — SIGNIFICANT CHANGE UP (ref 3.8–10.5)
WBC # FLD AUTO: 7.61 K/UL — SIGNIFICANT CHANGE UP (ref 3.8–10.5)

## 2023-03-07 PROCEDURE — 85610 PROTHROMBIN TIME: CPT

## 2023-03-07 PROCEDURE — 36415 COLL VENOUS BLD VENIPUNCTURE: CPT

## 2023-03-07 PROCEDURE — U0003: CPT

## 2023-03-07 PROCEDURE — 80048 BASIC METABOLIC PNL TOTAL CA: CPT

## 2023-03-07 PROCEDURE — U0005: CPT

## 2023-03-07 PROCEDURE — 85025 COMPLETE CBC W/AUTO DIFF WBC: CPT

## 2023-03-07 PROCEDURE — 85730 THROMBOPLASTIN TIME PARTIAL: CPT

## 2023-03-07 PROCEDURE — 99214 OFFICE O/P EST MOD 30 MIN: CPT | Mod: 25

## 2023-03-07 RX ORDER — OXYCODONE HYDROCHLORIDE 5 MG/1
1 TABLET ORAL
Qty: 0 | Refills: 0 | DISCHARGE

## 2023-03-07 NOTE — H&P PST ADULT - NEUROLOGICAL COMMENTS
hx inguinal puodenal nerve damage--followed by pain mgt Dr Hinojosa hx right  inguinal pudendal  nerve damage--followed by pain mgt Dr Hinojosa

## 2023-03-07 NOTE — H&P PST ADULT - ASSESSMENT
57 y.o female scheduled for  57 y.o female scheduled for  Endoscopy, Stent pull   Plan:  1. NPO post midnight  2. Follow bowel prep instructions from Dr. Montes De Oca  3. Follow preop medication instructions from Dr. Montes De Oca  4. Labs, EKG as per Dr. Montes De Oca   5. COVID swab done today--results pending

## 2023-03-07 NOTE — H&P PST ADULT - NSICDXPASTSURGICALHX_GEN_ALL_CORE_FT
PAST SURGICAL HISTORY:  Cystocele with rectocele 5/11/2017    Elective surgery removal of hepatic adenoma 2006    H/O elbow surgery     H/O endoscopy     H/O gastric bypass 3/2022    H/O hernia repair     Hepatic adenoma resected 2006    History of bilateral breast reduction surgery     History of cholecystectomy     History of rectal surgery 2013 interstimulator for rectal incontinence  later removed 2016    History of vaginal surgery     Rectovaginal fistula surgery - 07/2017    S/P appendectomy 1979    S/P colonoscopy multiple    S/P exploratory laparotomy cholecystectomy, ROZINA, endometriosis    S/P foot surgery, right     S/P hysterectomy with oophorectomy with mesh for stress incontinence    S/P LASIK surgery of both eyes 2000    S/P Nissen fundoplication (with gastrostomy tube placement) for barrets esophagus 2006    S/P tonsillectomy 1979    Stress incontinence, female right side of mesh "cut" adjusted    Tendon rupture, post-op repaired right elbow 2011    Traumatic rupture of plantar fascia of left foot, initial encounter surgery

## 2023-03-07 NOTE — H&P PST ADULT - HISTORY OF PRESENT ILLNESS
57 y.o WD, WN female presents to PST with hx of an esophageal stricture after gastric bypass 3/2022. Patient has followed with GI having a gastric stent placed with success. The stent has been in for about 10 weeks and now scheduled for removal. Scheduled for Endoscopy, Stent pull

## 2023-03-07 NOTE — H&P PST ADULT - NSICDXPASTMEDICALHX_GEN_ALL_CORE_FT
PAST MEDICAL HISTORY:  Acid reflux     Ahn esophagus     Bulging of cervical intervertebral disc     Chronic UTI     Constipation     Endometriosis     Esophageal stricture     Hemorrhoids, unspecified hemorrhoid type     Hepatic adenoma History of. Surgically removed 2006    Herniated thoracic disc without myelopathy     Hormone replacement therapy     Hyperlipidemia     Hypertension     Incontinence of feces, unspecified fecal incontinence type     Injury to sacral nerve root, subsequent encounter     Irritable bowel syndrome with both constipation and diarrhea     Obesity     Opioid dependence     Pelvic floor dysfunction     Postmenopausal disorder     Pudendal neuralgia     Rectocele History of 2015    Stress incontinence, female     Thyroid cyst     Torn meniscus left    UTI (urinary tract infection) frequent    Vitamin D deficiency

## 2023-03-08 DIAGNOSIS — K22.2 ESOPHAGEAL OBSTRUCTION: ICD-10-CM

## 2023-03-10 ENCOUNTER — OUTPATIENT (OUTPATIENT)
Dept: INPATIENT UNIT | Facility: HOSPITAL | Age: 58
LOS: 1 days | Discharge: HOME | End: 2023-03-10
Payer: COMMERCIAL

## 2023-03-10 ENCOUNTER — TRANSCRIPTION ENCOUNTER (OUTPATIENT)
Age: 58
End: 2023-03-10

## 2023-03-10 ENCOUNTER — APPOINTMENT (OUTPATIENT)
Dept: GASTROENTEROLOGY | Facility: HOSPITAL | Age: 58
End: 2023-03-10
Payer: COMMERCIAL

## 2023-03-10 VITALS
DIASTOLIC BLOOD PRESSURE: 60 MMHG | HEIGHT: 62 IN | HEART RATE: 77 BPM | TEMPERATURE: 98 F | RESPIRATION RATE: 16 BRPM | OXYGEN SATURATION: 97 % | SYSTOLIC BLOOD PRESSURE: 135 MMHG | WEIGHT: 136.69 LBS

## 2023-03-10 VITALS
OXYGEN SATURATION: 97 % | TEMPERATURE: 98 F | SYSTOLIC BLOOD PRESSURE: 104 MMHG | HEART RATE: 79 BPM | DIASTOLIC BLOOD PRESSURE: 57 MMHG | RESPIRATION RATE: 14 BRPM

## 2023-03-10 DIAGNOSIS — Z41.9 ENCOUNTER FOR PROCEDURE FOR PURPOSES OTHER THAN REMEDYING HEALTH STATE, UNSPECIFIED: Chronic | ICD-10-CM

## 2023-03-10 DIAGNOSIS — Z98.890 OTHER SPECIFIED POSTPROCEDURAL STATES: Chronic | ICD-10-CM

## 2023-03-10 DIAGNOSIS — K22.2 ESOPHAGEAL OBSTRUCTION: ICD-10-CM

## 2023-03-10 DIAGNOSIS — N81.10 CYSTOCELE, UNSPECIFIED: Chronic | ICD-10-CM

## 2023-03-10 DIAGNOSIS — Z98.84 BARIATRIC SURGERY STATUS: Chronic | ICD-10-CM

## 2023-03-10 DIAGNOSIS — N82.3 FISTULA OF VAGINA TO LARGE INTESTINE: Chronic | ICD-10-CM

## 2023-03-10 DIAGNOSIS — S96.812A STRAIN OF OTHER SPECIFIED MUSCLES AND TENDONS AT ANKLE AND FOOT LEVEL, LEFT FOOT, INITIAL ENCOUNTER: Chronic | ICD-10-CM

## 2023-03-10 DIAGNOSIS — Z90.49 ACQUIRED ABSENCE OF OTHER SPECIFIED PARTS OF DIGESTIVE TRACT: Chronic | ICD-10-CM

## 2023-03-10 PROCEDURE — 43247 EGD REMOVE FOREIGN BODY: CPT

## 2023-03-10 RX ORDER — PROCHLORPERAZINE MALEATE 5 MG
5 TABLET ORAL ONCE
Refills: 0 | Status: COMPLETED | OUTPATIENT
Start: 2023-03-10 | End: 2023-03-10

## 2023-03-10 RX ORDER — SODIUM CHLORIDE 9 MG/ML
1000 INJECTION, SOLUTION INTRAVENOUS
Refills: 0 | Status: DISCONTINUED | OUTPATIENT
Start: 2023-03-10 | End: 2023-03-10

## 2023-03-10 RX ORDER — ACETAMINOPHEN 500 MG
1000 TABLET ORAL ONCE
Refills: 0 | Status: COMPLETED | OUTPATIENT
Start: 2023-03-10 | End: 2023-03-10

## 2023-03-10 RX ADMIN — Medication 1000 MILLIGRAM(S): at 13:03

## 2023-03-10 RX ADMIN — Medication 5 MILLIGRAM(S): at 12:50

## 2023-03-10 RX ADMIN — Medication 400 MILLIGRAM(S): at 12:53

## 2023-03-10 NOTE — ASU PATIENT PROFILE, ADULT - FALL HARM RISK - UNIVERSAL INTERVENTIONS
Bed in lowest position, wheels locked, appropriate side rails in place/Call bell, personal items and telephone in reach/Instruct patient to call for assistance before getting out of bed or chair/Non-slip footwear when patient is out of bed/[place] to call system/Physically safe environment - no spills, clutter or unnecessary equipment/Purposeful Proactive Rounding/Room/bathroom lighting operational, light cord in reach

## 2023-03-10 NOTE — ASU PREOP CHECKLIST - RESPIRATORY RATE (BREATHS/MIN)
pt w/ hx of recurrent perirectal abscess c/o rectal pain/swelling, difficulty passing stools. denies fever, chills or blood in stool.
16

## 2023-03-15 DIAGNOSIS — Z88.2 ALLERGY STATUS TO SULFONAMIDES: ICD-10-CM

## 2023-03-15 DIAGNOSIS — Z88.0 ALLERGY STATUS TO PENICILLIN: ICD-10-CM

## 2023-03-15 DIAGNOSIS — I10 ESSENTIAL (PRIMARY) HYPERTENSION: ICD-10-CM

## 2023-03-15 DIAGNOSIS — Z90.49 ACQUIRED ABSENCE OF OTHER SPECIFIED PARTS OF DIGESTIVE TRACT: ICD-10-CM

## 2023-03-15 DIAGNOSIS — Z98.84 BARIATRIC SURGERY STATUS: ICD-10-CM

## 2023-03-15 DIAGNOSIS — Z46.59 ENCOUNTER FOR FITTING AND ADJUSTMENT OF OTHER GASTROINTESTINAL APPLIANCE AND DEVICE: ICD-10-CM

## 2023-03-15 DIAGNOSIS — Z87.891 PERSONAL HISTORY OF NICOTINE DEPENDENCE: ICD-10-CM

## 2023-03-15 DIAGNOSIS — Z88.1 ALLERGY STATUS TO OTHER ANTIBIOTIC AGENTS STATUS: ICD-10-CM

## 2023-03-15 DIAGNOSIS — Z87.440 PERSONAL HISTORY OF URINARY (TRACT) INFECTIONS: ICD-10-CM

## 2023-03-15 DIAGNOSIS — Z80.49 FAMILY HISTORY OF MALIGNANT NEOPLASM OF OTHER GENITAL ORGANS: ICD-10-CM

## 2023-03-15 DIAGNOSIS — Z88.6 ALLERGY STATUS TO ANALGESIC AGENT: ICD-10-CM

## 2023-03-15 DIAGNOSIS — K21.9 GASTRO-ESOPHAGEAL REFLUX DISEASE WITHOUT ESOPHAGITIS: ICD-10-CM

## 2023-03-15 DIAGNOSIS — Z90.710 ACQUIRED ABSENCE OF BOTH CERVIX AND UTERUS: ICD-10-CM

## 2023-03-15 DIAGNOSIS — Z80.52 FAMILY HISTORY OF MALIGNANT NEOPLASM OF BLADDER: ICD-10-CM

## 2023-04-20 PROBLEM — K22.2 ESOPHAGEAL OBSTRUCTION: Chronic | Status: ACTIVE | Noted: 2023-03-07

## 2023-04-20 PROBLEM — N95.1 MENOPAUSAL AND FEMALE CLIMACTERIC STATES: Chronic | Status: ACTIVE | Noted: 2023-03-07

## 2023-04-20 PROBLEM — F11.20 OPIOID DEPENDENCE, UNCOMPLICATED: Chronic | Status: ACTIVE | Noted: 2023-03-07

## 2023-04-20 PROBLEM — M50.30 OTHER CERVICAL DISC DEGENERATION, UNSPECIFIED CERVICAL REGION: Chronic | Status: ACTIVE | Noted: 2023-03-07

## 2023-05-02 ENCOUNTER — OUTPATIENT (OUTPATIENT)
Dept: OUTPATIENT SERVICES | Facility: HOSPITAL | Age: 58
LOS: 1 days | End: 2023-05-02
Payer: COMMERCIAL

## 2023-05-02 VITALS
RESPIRATION RATE: 18 BRPM | SYSTOLIC BLOOD PRESSURE: 105 MMHG | HEIGHT: 62 IN | TEMPERATURE: 98 F | HEART RATE: 74 BPM | DIASTOLIC BLOOD PRESSURE: 74 MMHG | OXYGEN SATURATION: 100 % | WEIGHT: 134.92 LBS

## 2023-05-02 DIAGNOSIS — Z98.890 OTHER SPECIFIED POSTPROCEDURAL STATES: Chronic | ICD-10-CM

## 2023-05-02 DIAGNOSIS — K22.2 ESOPHAGEAL OBSTRUCTION: ICD-10-CM

## 2023-05-02 DIAGNOSIS — S96.812A STRAIN OF OTHER SPECIFIED MUSCLES AND TENDONS AT ANKLE AND FOOT LEVEL, LEFT FOOT, INITIAL ENCOUNTER: Chronic | ICD-10-CM

## 2023-05-02 DIAGNOSIS — N81.10 CYSTOCELE, UNSPECIFIED: Chronic | ICD-10-CM

## 2023-05-02 DIAGNOSIS — Z98.84 BARIATRIC SURGERY STATUS: Chronic | ICD-10-CM

## 2023-05-02 DIAGNOSIS — Z90.49 ACQUIRED ABSENCE OF OTHER SPECIFIED PARTS OF DIGESTIVE TRACT: Chronic | ICD-10-CM

## 2023-05-02 DIAGNOSIS — Z41.9 ENCOUNTER FOR PROCEDURE FOR PURPOSES OTHER THAN REMEDYING HEALTH STATE, UNSPECIFIED: Chronic | ICD-10-CM

## 2023-05-02 DIAGNOSIS — N82.3 FISTULA OF VAGINA TO LARGE INTESTINE: Chronic | ICD-10-CM

## 2023-05-02 LAB
APTT BLD: 34.3 SEC — SIGNIFICANT CHANGE UP (ref 27.5–35.5)
BASOPHILS # BLD AUTO: 0.04 K/UL — SIGNIFICANT CHANGE UP (ref 0–0.2)
BASOPHILS NFR BLD AUTO: 0.9 % — SIGNIFICANT CHANGE UP (ref 0–2)
BUN SERPL-MCNC: 6 MG/DL — LOW (ref 7–23)
CALCIUM SERPL-MCNC: 9.1 MG/DL — SIGNIFICANT CHANGE UP (ref 8.5–10.1)
CHLORIDE SERPL-SCNC: 108 MMOL/L — SIGNIFICANT CHANGE UP (ref 96–108)
CO2 SERPL-SCNC: 31 MMOL/L — SIGNIFICANT CHANGE UP (ref 22–31)
CREAT SERPL-MCNC: 0.81 MG/DL — SIGNIFICANT CHANGE UP (ref 0.5–1.3)
EGFR: 85 ML/MIN/1.73M2 — SIGNIFICANT CHANGE UP
EOSINOPHIL # BLD AUTO: 0.08 K/UL — SIGNIFICANT CHANGE UP (ref 0–0.5)
EOSINOPHIL NFR BLD AUTO: 1.7 % — SIGNIFICANT CHANGE UP (ref 0–6)
GLUCOSE SERPL-MCNC: 76 MG/DL — SIGNIFICANT CHANGE UP (ref 70–99)
HCT VFR BLD CALC: 40 % — SIGNIFICANT CHANGE UP (ref 34.5–45)
HGB BLD-MCNC: 12.9 G/DL — SIGNIFICANT CHANGE UP (ref 11.5–15.5)
IMM GRANULOCYTES NFR BLD AUTO: 0.4 % — SIGNIFICANT CHANGE UP (ref 0–0.9)
INR BLD: 1.1 RATIO — SIGNIFICANT CHANGE UP (ref 0.88–1.16)
LYMPHOCYTES # BLD AUTO: 1.72 K/UL — SIGNIFICANT CHANGE UP (ref 1–3.3)
LYMPHOCYTES # BLD AUTO: 36.6 % — SIGNIFICANT CHANGE UP (ref 13–44)
MCHC RBC-ENTMCNC: 28.7 PG — SIGNIFICANT CHANGE UP (ref 27–34)
MCHC RBC-ENTMCNC: 32.3 GM/DL — SIGNIFICANT CHANGE UP (ref 32–36)
MCV RBC AUTO: 89.1 FL — SIGNIFICANT CHANGE UP (ref 80–100)
MONOCYTES # BLD AUTO: 0.25 K/UL — SIGNIFICANT CHANGE UP (ref 0–0.9)
MONOCYTES NFR BLD AUTO: 5.3 % — SIGNIFICANT CHANGE UP (ref 2–14)
NEUTROPHILS # BLD AUTO: 2.59 K/UL — SIGNIFICANT CHANGE UP (ref 1.8–7.4)
NEUTROPHILS NFR BLD AUTO: 55.1 % — SIGNIFICANT CHANGE UP (ref 43–77)
PLATELET # BLD AUTO: 234 K/UL — SIGNIFICANT CHANGE UP (ref 150–400)
POTASSIUM SERPL-MCNC: 3.7 MMOL/L — SIGNIFICANT CHANGE UP (ref 3.5–5.3)
POTASSIUM SERPL-SCNC: 3.7 MMOL/L — SIGNIFICANT CHANGE UP (ref 3.5–5.3)
PROTHROM AB SERPL-ACNC: 12.8 SEC — SIGNIFICANT CHANGE UP (ref 10.5–13.4)
RBC # BLD: 4.49 M/UL — SIGNIFICANT CHANGE UP (ref 3.8–5.2)
RBC # FLD: 12.8 % — SIGNIFICANT CHANGE UP (ref 10.3–14.5)
SARS-COV-2 RNA SPEC QL NAA+PROBE: SIGNIFICANT CHANGE UP
SODIUM SERPL-SCNC: 140 MMOL/L — SIGNIFICANT CHANGE UP (ref 135–145)
WBC # BLD: 4.7 K/UL — SIGNIFICANT CHANGE UP (ref 3.8–10.5)
WBC # FLD AUTO: 4.7 K/UL — SIGNIFICANT CHANGE UP (ref 3.8–10.5)

## 2023-05-02 PROCEDURE — 80048 BASIC METABOLIC PNL TOTAL CA: CPT

## 2023-05-02 PROCEDURE — 87635 SARS-COV-2 COVID-19 AMP PRB: CPT

## 2023-05-02 PROCEDURE — 36415 COLL VENOUS BLD VENIPUNCTURE: CPT

## 2023-05-02 PROCEDURE — 99214 OFFICE O/P EST MOD 30 MIN: CPT | Mod: 25

## 2023-05-02 PROCEDURE — 85025 COMPLETE CBC W/AUTO DIFF WBC: CPT

## 2023-05-02 PROCEDURE — 85610 PROTHROMBIN TIME: CPT

## 2023-05-02 PROCEDURE — 85730 THROMBOPLASTIN TIME PARTIAL: CPT

## 2023-05-02 NOTE — H&P PST ADULT - NSICDXPASTMEDICALHX_GEN_ALL_CORE_FT
DISPLAY PLAN FREE TEXT
PAST MEDICAL HISTORY:  Acid reflux     Ahn esophagus     Bulging of cervical intervertebral disc     Chronic UTI     Constipation     Endometriosis     Esophageal stricture     Hemorrhoids, unspecified hemorrhoid type     Hepatic adenoma History of. Surgically removed 2006    Herniated thoracic disc without myelopathy     Hormone replacement therapy     Hyperlipidemia     Hypertension     Incontinence of feces, unspecified fecal incontinence type     Injury to sacral nerve root, subsequent encounter     Irritable bowel syndrome with both constipation and diarrhea     Obesity     Opioid dependence     Pelvic floor dysfunction     Postmenopausal disorder     Pudendal neuralgia     Rectocele History of 2015    Stress incontinence, female     Thyroid cyst     Torn meniscus left    UTI (urinary tract infection) frequent    Vitamin D deficiency

## 2023-05-02 NOTE — H&P PST ADULT - HISTORY OF PRESENT ILLNESS
57 y.o female presents to PST with hx of esophageal stricture after gastric bypass on 3/2022. Patient had a gastric stent placed with alleviation of patient symptoms, the stent was removed on March 2023. Pt reports difficulty keeping foods and liquids down after removal of gastric stent. She is here today for PST for planned EGD with stent placement.

## 2023-05-02 NOTE — H&P PST ADULT - ASSESSMENT
57 y.o female presents to PST with hx of esophageal stricture after gastric bypass on 3/2022. She is scheduled for EGD with stent placement.   Plan:  1. NPO post midnight  2. Follow preop medication instructions from Dr. Montes De Oca  3. Labs, EKG as per Dr. Montes De Oca  4. COVID test done today

## 2023-05-03 DIAGNOSIS — K22.2 ESOPHAGEAL OBSTRUCTION: ICD-10-CM

## 2023-05-05 ENCOUNTER — OUTPATIENT (OUTPATIENT)
Dept: INPATIENT UNIT | Facility: HOSPITAL | Age: 58
LOS: 1 days | Discharge: HOME | End: 2023-05-05
Payer: COMMERCIAL

## 2023-05-05 ENCOUNTER — APPOINTMENT (OUTPATIENT)
Dept: GASTROENTEROLOGY | Facility: HOSPITAL | Age: 58
End: 2023-05-05
Payer: COMMERCIAL

## 2023-05-05 ENCOUNTER — TRANSCRIPTION ENCOUNTER (OUTPATIENT)
Age: 58
End: 2023-05-05

## 2023-05-05 VITALS
TEMPERATURE: 97 F | HEART RATE: 72 BPM | OXYGEN SATURATION: 98 % | SYSTOLIC BLOOD PRESSURE: 122 MMHG | DIASTOLIC BLOOD PRESSURE: 72 MMHG | RESPIRATION RATE: 16 BRPM

## 2023-05-05 VITALS
HEART RATE: 71 BPM | RESPIRATION RATE: 16 BRPM | WEIGHT: 134.92 LBS | TEMPERATURE: 98 F | SYSTOLIC BLOOD PRESSURE: 145 MMHG | HEIGHT: 62 IN | DIASTOLIC BLOOD PRESSURE: 72 MMHG | OXYGEN SATURATION: 100 %

## 2023-05-05 DIAGNOSIS — Z98.890 OTHER SPECIFIED POSTPROCEDURAL STATES: Chronic | ICD-10-CM

## 2023-05-05 DIAGNOSIS — Z98.84 BARIATRIC SURGERY STATUS: Chronic | ICD-10-CM

## 2023-05-05 DIAGNOSIS — N82.3 FISTULA OF VAGINA TO LARGE INTESTINE: Chronic | ICD-10-CM

## 2023-05-05 DIAGNOSIS — Z90.49 ACQUIRED ABSENCE OF OTHER SPECIFIED PARTS OF DIGESTIVE TRACT: Chronic | ICD-10-CM

## 2023-05-05 DIAGNOSIS — S96.812A STRAIN OF OTHER SPECIFIED MUSCLES AND TENDONS AT ANKLE AND FOOT LEVEL, LEFT FOOT, INITIAL ENCOUNTER: Chronic | ICD-10-CM

## 2023-05-05 DIAGNOSIS — K22.2 ESOPHAGEAL OBSTRUCTION: ICD-10-CM

## 2023-05-05 DIAGNOSIS — N81.10 CYSTOCELE, UNSPECIFIED: Chronic | ICD-10-CM

## 2023-05-05 DIAGNOSIS — Z41.9 ENCOUNTER FOR PROCEDURE FOR PURPOSES OTHER THAN REMEDYING HEALTH STATE, UNSPECIFIED: Chronic | ICD-10-CM

## 2023-05-05 PROCEDURE — 43266 EGD ENDOSCOPIC STENT PLACE: CPT | Mod: GC

## 2023-05-05 PROCEDURE — C1726: CPT

## 2023-05-05 PROCEDURE — C1874: CPT

## 2023-05-05 PROCEDURE — C1769: CPT

## 2023-05-05 RX ORDER — ACETAMINOPHEN 500 MG
1000 TABLET ORAL ONCE
Refills: 0 | Status: DISCONTINUED | OUTPATIENT
Start: 2023-05-05 | End: 2023-05-05

## 2023-05-05 RX ORDER — ESTRADIOL 4 UG/1
2 INSERT VAGINAL
Qty: 0 | Refills: 0 | DISCHARGE

## 2023-05-05 RX ORDER — OXYCODONE AND ACETAMINOPHEN 5; 325 MG/1; MG/1
1 TABLET ORAL
Refills: 0 | DISCHARGE

## 2023-05-05 RX ORDER — CHLORHEXIDINE GLUCONATE 213 G/1000ML
15 SOLUTION TOPICAL
Refills: 0 | DISCHARGE

## 2023-05-05 RX ORDER — ESZOPICLONE 2 MG/1
1 TABLET, COATED ORAL
Refills: 0 | DISCHARGE

## 2023-05-05 RX ORDER — NALOXEGOL OXALATE 12.5 MG/1
1 TABLET, FILM COATED ORAL
Refills: 0 | DISCHARGE

## 2023-05-05 RX ORDER — LIRAGLUTIDE 6 MG/ML
1.2 INJECTION SUBCUTANEOUS
Refills: 0 | DISCHARGE

## 2023-05-05 RX ORDER — NEBIVOLOL HYDROCHLORIDE 5 MG/1
1 TABLET ORAL
Refills: 0 | DISCHARGE

## 2023-05-05 RX ORDER — FENTANYL CITRATE 50 UG/ML
50 INJECTION INTRAVENOUS
Refills: 0 | Status: DISCONTINUED | OUTPATIENT
Start: 2023-05-05 | End: 2023-05-05

## 2023-05-05 RX ORDER — ONDANSETRON 8 MG/1
4 TABLET, FILM COATED ORAL ONCE
Refills: 0 | Status: COMPLETED | OUTPATIENT
Start: 2023-05-05 | End: 2023-05-05

## 2023-05-05 RX ORDER — FLUOXETINE HCL 10 MG
1 CAPSULE ORAL
Refills: 0 | DISCHARGE

## 2023-05-05 RX ORDER — SODIUM CHLORIDE 9 MG/ML
1000 INJECTION, SOLUTION INTRAVENOUS
Refills: 0 | Status: DISCONTINUED | OUTPATIENT
Start: 2023-05-05 | End: 2023-05-05

## 2023-05-05 RX ORDER — ROSUVASTATIN CALCIUM 5 MG/1
1 TABLET ORAL
Refills: 0 | DISCHARGE

## 2023-05-05 RX ORDER — OXYCODONE HYDROCHLORIDE 5 MG/1
5 TABLET ORAL ONCE
Refills: 0 | Status: DISCONTINUED | OUTPATIENT
Start: 2023-05-05 | End: 2023-05-05

## 2023-05-05 RX ADMIN — ONDANSETRON 4 MILLIGRAM(S): 8 TABLET, FILM COATED ORAL at 10:55

## 2023-05-05 NOTE — ASU PATIENT PROFILE, ADULT - LAST TOBACCO USE (DD-MM-YY)
07-May-1990
Him/He
Solaraze Counseling:  I discussed with the patient the risks of Solaraze including but not limited to erythema, scaling, itching, weeping, crusting, and pain.

## 2023-05-05 NOTE — ASU PATIENT PROFILE, ADULT - PRO MENTAL HEALTH SX RECENT
Hydroxychloroquine Pregnancy And Lactation Text: This medication has been shown to cause fetal harm but it isn't assigned a Pregnancy Risk Category. There are small amounts excreted in breast milk. none

## 2023-05-05 NOTE — ASU DISCHARGE PLAN (ADULT/PEDIATRIC) - NS MD DC FALL RISK RISK
For information on Fall & Injury Prevention, visit: [email or web address] OR  [email or web address] OR  https://www.cdc.gov/steadi/patient.html

## 2023-05-09 DIAGNOSIS — Z87.440 PERSONAL HISTORY OF URINARY (TRACT) INFECTIONS: ICD-10-CM

## 2023-05-09 DIAGNOSIS — E55.9 VITAMIN D DEFICIENCY, UNSPECIFIED: ICD-10-CM

## 2023-05-09 DIAGNOSIS — Z88.0 ALLERGY STATUS TO PENICILLIN: ICD-10-CM

## 2023-05-09 DIAGNOSIS — G47.33 OBSTRUCTIVE SLEEP APNEA (ADULT) (PEDIATRIC): ICD-10-CM

## 2023-05-09 DIAGNOSIS — R13.10 DYSPHAGIA, UNSPECIFIED: ICD-10-CM

## 2023-05-09 DIAGNOSIS — Z87.891 PERSONAL HISTORY OF NICOTINE DEPENDENCE: ICD-10-CM

## 2023-05-09 DIAGNOSIS — F32.A DEPRESSION, UNSPECIFIED: ICD-10-CM
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
## 2023-05-09 DIAGNOSIS — F11.21 OPIOID DEPENDENCE, IN REMISSION: ICD-10-CM

## 2023-05-09 DIAGNOSIS — E78.5 HYPERLIPIDEMIA, UNSPECIFIED: ICD-10-CM

## 2023-05-09 DIAGNOSIS — K91.89 OTHER POSTPROCEDURAL COMPLICATIONS AND DISORDERS OF DIGESTIVE SYSTEM: ICD-10-CM

## 2023-05-09 DIAGNOSIS — I10 ESSENTIAL (PRIMARY) HYPERTENSION: ICD-10-CM

## 2023-05-09 DIAGNOSIS — Z88.2 ALLERGY STATUS TO SULFONAMIDES: ICD-10-CM
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
## 2023-05-09 DIAGNOSIS — Z90.710 ACQUIRED ABSENCE OF BOTH CERVIX AND UTERUS: ICD-10-CM

## 2023-05-09 DIAGNOSIS — K21.9 GASTRO-ESOPHAGEAL REFLUX DISEASE WITHOUT ESOPHAGITIS: ICD-10-CM

## 2023-05-21 ENCOUNTER — NON-APPOINTMENT (OUTPATIENT)
Age: 58
End: 2023-05-21

## 2023-07-05 ENCOUNTER — OUTPATIENT (OUTPATIENT)
Dept: OUTPATIENT SERVICES | Facility: HOSPITAL | Age: 58
LOS: 1 days | End: 2023-07-05
Payer: COMMERCIAL

## 2023-07-05 VITALS
WEIGHT: 152.12 LBS | OXYGEN SATURATION: 98 % | TEMPERATURE: 97 F | HEIGHT: 62 IN | RESPIRATION RATE: 16 BRPM | SYSTOLIC BLOOD PRESSURE: 118 MMHG | HEART RATE: 79 BPM | DIASTOLIC BLOOD PRESSURE: 63 MMHG

## 2023-07-05 DIAGNOSIS — Z98.890 OTHER SPECIFIED POSTPROCEDURAL STATES: Chronic | ICD-10-CM

## 2023-07-05 DIAGNOSIS — Z41.9 ENCOUNTER FOR PROCEDURE FOR PURPOSES OTHER THAN REMEDYING HEALTH STATE, UNSPECIFIED: Chronic | ICD-10-CM

## 2023-07-05 DIAGNOSIS — Z90.49 ACQUIRED ABSENCE OF OTHER SPECIFIED PARTS OF DIGESTIVE TRACT: Chronic | ICD-10-CM

## 2023-07-05 DIAGNOSIS — K22.2 ESOPHAGEAL OBSTRUCTION: ICD-10-CM

## 2023-07-05 DIAGNOSIS — S96.812A STRAIN OF OTHER SPECIFIED MUSCLES AND TENDONS AT ANKLE AND FOOT LEVEL, LEFT FOOT, INITIAL ENCOUNTER: Chronic | ICD-10-CM

## 2023-07-05 DIAGNOSIS — Z98.84 BARIATRIC SURGERY STATUS: Chronic | ICD-10-CM

## 2023-07-05 DIAGNOSIS — N82.3 FISTULA OF VAGINA TO LARGE INTESTINE: Chronic | ICD-10-CM

## 2023-07-05 DIAGNOSIS — N81.10 CYSTOCELE, UNSPECIFIED: Chronic | ICD-10-CM

## 2023-07-05 LAB
APTT BLD: 34.1 SEC — SIGNIFICANT CHANGE UP (ref 27.5–35.5)
BASOPHILS # BLD AUTO: 0.09 K/UL — SIGNIFICANT CHANGE UP (ref 0–0.2)
BASOPHILS NFR BLD AUTO: 1.2 % — SIGNIFICANT CHANGE UP (ref 0–2)
BUN SERPL-MCNC: 8 MG/DL — SIGNIFICANT CHANGE UP (ref 7–23)
CALCIUM SERPL-MCNC: 8.6 MG/DL — SIGNIFICANT CHANGE UP (ref 8.5–10.1)
CHLORIDE SERPL-SCNC: 112 MMOL/L — HIGH (ref 96–108)
CO2 SERPL-SCNC: 32 MMOL/L — HIGH (ref 22–31)
CREAT SERPL-MCNC: 0.74 MG/DL — SIGNIFICANT CHANGE UP (ref 0.5–1.3)
EGFR: 95 ML/MIN/1.73M2 — SIGNIFICANT CHANGE UP
EOSINOPHIL # BLD AUTO: 0.2 K/UL — SIGNIFICANT CHANGE UP (ref 0–0.5)
EOSINOPHIL NFR BLD AUTO: 2.7 % — SIGNIFICANT CHANGE UP (ref 0–6)
GLUCOSE SERPL-MCNC: 88 MG/DL — SIGNIFICANT CHANGE UP (ref 70–99)
HCT VFR BLD CALC: 34.8 % — SIGNIFICANT CHANGE UP (ref 34.5–45)
HGB BLD-MCNC: 11.5 G/DL — SIGNIFICANT CHANGE UP (ref 11.5–15.5)
IMM GRANULOCYTES NFR BLD AUTO: 0.3 % — SIGNIFICANT CHANGE UP (ref 0–0.9)
INR BLD: 1.03 RATIO — SIGNIFICANT CHANGE UP (ref 0.88–1.16)
LYMPHOCYTES # BLD AUTO: 3.05 K/UL — SIGNIFICANT CHANGE UP (ref 1–3.3)
LYMPHOCYTES # BLD AUTO: 40.6 % — SIGNIFICANT CHANGE UP (ref 13–44)
MCHC RBC-ENTMCNC: 29.6 PG — SIGNIFICANT CHANGE UP (ref 27–34)
MCHC RBC-ENTMCNC: 33 GM/DL — SIGNIFICANT CHANGE UP (ref 32–36)
MCV RBC AUTO: 89.5 FL — SIGNIFICANT CHANGE UP (ref 80–100)
MONOCYTES # BLD AUTO: 0.51 K/UL — SIGNIFICANT CHANGE UP (ref 0–0.9)
MONOCYTES NFR BLD AUTO: 6.8 % — SIGNIFICANT CHANGE UP (ref 2–14)
NEUTROPHILS # BLD AUTO: 3.65 K/UL — SIGNIFICANT CHANGE UP (ref 1.8–7.4)
NEUTROPHILS NFR BLD AUTO: 48.4 % — SIGNIFICANT CHANGE UP (ref 43–77)
PLATELET # BLD AUTO: 312 K/UL — SIGNIFICANT CHANGE UP (ref 150–400)
POTASSIUM SERPL-MCNC: 3.9 MMOL/L — SIGNIFICANT CHANGE UP (ref 3.5–5.3)
POTASSIUM SERPL-SCNC: 3.9 MMOL/L — SIGNIFICANT CHANGE UP (ref 3.5–5.3)
PROTHROM AB SERPL-ACNC: 12 SEC — SIGNIFICANT CHANGE UP (ref 10.5–13.4)
RBC # BLD: 3.89 M/UL — SIGNIFICANT CHANGE UP (ref 3.8–5.2)
RBC # FLD: 13 % — SIGNIFICANT CHANGE UP (ref 10.3–14.5)
SARS-COV-2 RNA SPEC QL NAA+PROBE: SIGNIFICANT CHANGE UP
SODIUM SERPL-SCNC: 144 MMOL/L — SIGNIFICANT CHANGE UP (ref 135–145)
WBC # BLD: 7.52 K/UL — SIGNIFICANT CHANGE UP (ref 3.8–10.5)
WBC # FLD AUTO: 7.52 K/UL — SIGNIFICANT CHANGE UP (ref 3.8–10.5)

## 2023-07-05 PROCEDURE — 85730 THROMBOPLASTIN TIME PARTIAL: CPT

## 2023-07-05 PROCEDURE — 80048 BASIC METABOLIC PNL TOTAL CA: CPT

## 2023-07-05 PROCEDURE — 93010 ELECTROCARDIOGRAM REPORT: CPT

## 2023-07-05 PROCEDURE — 85610 PROTHROMBIN TIME: CPT

## 2023-07-05 PROCEDURE — 93005 ELECTROCARDIOGRAM TRACING: CPT

## 2023-07-05 PROCEDURE — 36415 COLL VENOUS BLD VENIPUNCTURE: CPT

## 2023-07-05 PROCEDURE — 87635 SARS-COV-2 COVID-19 AMP PRB: CPT

## 2023-07-05 PROCEDURE — 85025 COMPLETE CBC W/AUTO DIFF WBC: CPT

## 2023-07-05 PROCEDURE — 99214 OFFICE O/P EST MOD 30 MIN: CPT | Mod: 25

## 2023-07-05 RX ORDER — ERGOCALCIFEROL 1.25 MG/1
1 CAPSULE ORAL
Qty: 0 | Refills: 0 | DISCHARGE

## 2023-07-05 RX ORDER — NITROFURANTOIN MACROCRYSTAL 50 MG
1 CAPSULE ORAL
Refills: 0 | DISCHARGE

## 2023-07-05 RX ORDER — ESTROPIPATE 1.5MG/G
0 CREAM (GRAM) VAGINAL
Refills: 0 | DISCHARGE

## 2023-07-05 NOTE — H&P PST ADULT - ASSESSMENT
No
57 year old female presents to PST with history of esophageal stricture after gastric bypass surgery in March 2022. Stent was placed in May 2023 to help alleviate symptoms of inability to keep food or liquid down. She endorses resolution of symptoms and is now having EGD with removal of gastric stent.     1.  Dr Montes De Oca  office  will instruct patient regarding medication regimen on day of procedure.  2. Endoscopy booking will call patient the day before surgery for arrival instructions   3. NPO post midnight  4. Blood work and EKG done   5. Patient instructed to have responsible adult accompany/ drive pt home after procedure  6. Covid test performed today.

## 2023-07-05 NOTE — H&P PST ADULT - HISTORY OF PRESENT ILLNESS
57 year old female presents to PST with history of esophageal stricture after gastric bypass surgery in March 2022. Stent was placed in May 2023 to help alleviate symptoms of inability to keep food or liquid down. She endorses resolution of symptoms and is now having EGD with removal of gastric stent.

## 2023-07-05 NOTE — H&P PST ADULT - NSICDXPASTSURGICALHX_GEN_ALL_CORE_FT
PAST SURGICAL HISTORY:  Cystocele with rectocele 5/11/2017    Elective surgery removal of hepatic adenoma 2006    H/O elbow surgery     H/O endoscopy     H/O gastric bypass 3/2022    H/O hernia repair     Hepatic adenoma resected 2006    History of bilateral breast reduction surgery     History of biliary duct stent placement     History of cholecystectomy     History of rectal surgery 2013 interstimulator for rectal incontinence  later removed 2016    History of vaginal surgery     Rectovaginal fistula surgery - 07/2017    S/P appendectomy 1979    S/P colonoscopy multiple    S/P exploratory laparotomy cholecystectomy, ROZINA, endometriosis    S/P foot surgery, right     S/P hysterectomy with oophorectomy with mesh for stress incontinence    S/P LASIK surgery of both eyes 2000    S/P Nissen fundoplication (with gastrostomy tube placement) for barrets esophagus 2006    S/P tonsillectomy 1979    Stress incontinence, female right side of mesh "cut" adjusted    Tendon rupture, post-op repaired right elbow 2011    Traumatic rupture of plantar fascia of left foot, initial encounter surgery

## 2023-07-06 DIAGNOSIS — K22.2 ESOPHAGEAL OBSTRUCTION: ICD-10-CM

## 2023-07-07 ENCOUNTER — APPOINTMENT (OUTPATIENT)
Dept: GASTROENTEROLOGY | Facility: HOSPITAL | Age: 58
End: 2023-07-07
Payer: COMMERCIAL

## 2023-07-07 ENCOUNTER — OUTPATIENT (OUTPATIENT)
Dept: INPATIENT UNIT | Facility: HOSPITAL | Age: 58
LOS: 1 days | Discharge: HOME | End: 2023-07-07

## 2023-07-07 ENCOUNTER — TRANSCRIPTION ENCOUNTER (OUTPATIENT)
Age: 58
End: 2023-07-07

## 2023-07-07 VITALS
TEMPERATURE: 98 F | OXYGEN SATURATION: 100 % | HEART RATE: 81 BPM | SYSTOLIC BLOOD PRESSURE: 155 MMHG | RESPIRATION RATE: 16 BRPM | DIASTOLIC BLOOD PRESSURE: 90 MMHG

## 2023-07-07 VITALS
RESPIRATION RATE: 16 BRPM | SYSTOLIC BLOOD PRESSURE: 149 MMHG | HEIGHT: 62 IN | DIASTOLIC BLOOD PRESSURE: 92 MMHG | HEART RATE: 82 BPM | OXYGEN SATURATION: 100 % | TEMPERATURE: 98 F | WEIGHT: 152.12 LBS

## 2023-07-07 DIAGNOSIS — N82.3 FISTULA OF VAGINA TO LARGE INTESTINE: Chronic | ICD-10-CM

## 2023-07-07 DIAGNOSIS — Z98.890 OTHER SPECIFIED POSTPROCEDURAL STATES: Chronic | ICD-10-CM

## 2023-07-07 DIAGNOSIS — K22.2 ESOPHAGEAL OBSTRUCTION: ICD-10-CM

## 2023-07-07 DIAGNOSIS — S96.812A STRAIN OF OTHER SPECIFIED MUSCLES AND TENDONS AT ANKLE AND FOOT LEVEL, LEFT FOOT, INITIAL ENCOUNTER: Chronic | ICD-10-CM

## 2023-07-07 DIAGNOSIS — Z90.49 ACQUIRED ABSENCE OF OTHER SPECIFIED PARTS OF DIGESTIVE TRACT: Chronic | ICD-10-CM

## 2023-07-07 DIAGNOSIS — Z41.9 ENCOUNTER FOR PROCEDURE FOR PURPOSES OTHER THAN REMEDYING HEALTH STATE, UNSPECIFIED: Chronic | ICD-10-CM

## 2023-07-07 DIAGNOSIS — N81.10 CYSTOCELE, UNSPECIFIED: Chronic | ICD-10-CM

## 2023-07-07 DIAGNOSIS — Z98.84 BARIATRIC SURGERY STATUS: Chronic | ICD-10-CM

## 2023-07-07 PROCEDURE — 43247 EGD REMOVE FOREIGN BODY: CPT | Mod: GC

## 2023-07-07 NOTE — ASU PATIENT PROFILE, ADULT - FALL HARM RISK - UNIVERSAL INTERVENTIONS
Bed in lowest position, wheels locked, appropriate side rails in place/Call bell, personal items and telephone in reach/Instruct patient to call for assistance before getting out of bed or chair/Non-slip footwear when patient is out of bed/[place] to call system/Physically safe environment - no spills, clutter or unnecessary equipment/Purposeful Proactive Rounding/Room/bathroom lighting operational, light cord in reach

## 2023-07-07 NOTE — ASU DISCHARGE PLAN (ADULT/PEDIATRIC) - NS MD DC FALL RISK RISK
For information on Fall & Injury Prevention, visit: [email or web address] OR  [email or web address] OR  https://www.cdc.gov/steadi/patient.html

## 2023-07-07 NOTE — ASU PATIENT PROFILE, ADULT - NSALCOHOLFREQ_GEN_A_CORE_SD
"   06/28/22 1550   Final Note   Assessment Type Discharge Planning Assessment   Anticipated Discharge Disposition Home   What phone number can be called within the next 1-3 days to see how you are doing after discharge? [phone]   Post-Acute Status   Discharge Delays None known at this time     Patient transferred home by family/wife. He had no interest in an appointment with his   PCP. He will follow up with Dr. Roth in his office in 1 week.  Per Dr. Roth, patient needs crutches, no walker, PWB up to 20 pounds max,   Keep bandage dry, needs prescription for ASA and pain meds, no home health needed."  Dr. Salguero wrote orders for   listed needs.     Patient's crutches were delivered to him bedside prior to d/c.  " monthly or less

## 2023-07-07 NOTE — ASU PREOP CHECKLIST - BOWEL PREP
Called patient as we received a call from scheduling as patient was inquiring for an annual mammogram. Spoke to patient and verified that she is good to return for mammogram at age 37 unless new symptoms develop. Patient verbalized to understanding and no further questions.
n/a

## 2023-07-12 DIAGNOSIS — Z80.52 FAMILY HISTORY OF MALIGNANT NEOPLASM OF BLADDER: ICD-10-CM

## 2023-07-12 DIAGNOSIS — Z90.49 ACQUIRED ABSENCE OF OTHER SPECIFIED PARTS OF DIGESTIVE TRACT: ICD-10-CM

## 2023-07-12 DIAGNOSIS — Z98.84 BARIATRIC SURGERY STATUS: ICD-10-CM

## 2023-07-12 DIAGNOSIS — Z80.49 FAMILY HISTORY OF MALIGNANT NEOPLASM OF OTHER GENITAL ORGANS: ICD-10-CM

## 2023-07-12 DIAGNOSIS — Z88.0 ALLERGY STATUS TO PENICILLIN: ICD-10-CM

## 2023-07-12 DIAGNOSIS — K21.9 GASTRO-ESOPHAGEAL REFLUX DISEASE WITHOUT ESOPHAGITIS: ICD-10-CM

## 2023-07-12 DIAGNOSIS — Z88.6 ALLERGY STATUS TO ANALGESIC AGENT: ICD-10-CM

## 2023-07-12 DIAGNOSIS — Z88.1 ALLERGY STATUS TO OTHER ANTIBIOTIC AGENTS STATUS: ICD-10-CM

## 2023-07-12 DIAGNOSIS — Z46.59 ENCOUNTER FOR FITTING AND ADJUSTMENT OF OTHER GASTROINTESTINAL APPLIANCE AND DEVICE: ICD-10-CM

## 2023-07-12 DIAGNOSIS — Z87.891 PERSONAL HISTORY OF NICOTINE DEPENDENCE: ICD-10-CM

## 2023-07-12 DIAGNOSIS — I10 ESSENTIAL (PRIMARY) HYPERTENSION: ICD-10-CM

## 2023-07-12 DIAGNOSIS — Z90.710 ACQUIRED ABSENCE OF BOTH CERVIX AND UTERUS: ICD-10-CM

## 2023-07-12 DIAGNOSIS — Z88.2 ALLERGY STATUS TO SULFONAMIDES: ICD-10-CM

## 2023-08-04 NOTE — H&P PST ADULT - WEIGHT IN KG
Left a message for the patient that he is due for a follow up but last note states the patient is moving back to [place] not sure if this has taken place or not.    86.2

## 2023-08-29 NOTE — H&P PST ADULT - PAIN LOCATION, PROFILE
abdomen

## 2023-09-14 ENCOUNTER — RESULT REVIEW (OUTPATIENT)
Age: 58
End: 2023-09-14

## 2023-09-14 ENCOUNTER — OUTPATIENT (OUTPATIENT)
Dept: OUTPATIENT SERVICES | Facility: HOSPITAL | Age: 58
LOS: 1 days | End: 2023-09-14
Payer: COMMERCIAL

## 2023-09-14 ENCOUNTER — APPOINTMENT (OUTPATIENT)
Dept: MAMMOGRAPHY | Facility: CLINIC | Age: 58
End: 2023-09-14
Payer: COMMERCIAL

## 2023-09-14 ENCOUNTER — APPOINTMENT (OUTPATIENT)
Dept: RADIOLOGY | Facility: CLINIC | Age: 58
End: 2023-09-14
Payer: COMMERCIAL

## 2023-09-14 ENCOUNTER — APPOINTMENT (OUTPATIENT)
Dept: ULTRASOUND IMAGING | Facility: CLINIC | Age: 58
End: 2023-09-14
Payer: COMMERCIAL

## 2023-09-14 DIAGNOSIS — Z98.890 OTHER SPECIFIED POSTPROCEDURAL STATES: Chronic | ICD-10-CM

## 2023-09-14 DIAGNOSIS — S96.812A STRAIN OF OTHER SPECIFIED MUSCLES AND TENDONS AT ANKLE AND FOOT LEVEL, LEFT FOOT, INITIAL ENCOUNTER: Chronic | ICD-10-CM

## 2023-09-14 DIAGNOSIS — Z98.84 BARIATRIC SURGERY STATUS: Chronic | ICD-10-CM

## 2023-09-14 DIAGNOSIS — N82.3 FISTULA OF VAGINA TO LARGE INTESTINE: Chronic | ICD-10-CM

## 2023-09-14 DIAGNOSIS — N81.10 CYSTOCELE, UNSPECIFIED: Chronic | ICD-10-CM

## 2023-09-14 DIAGNOSIS — Z41.9 ENCOUNTER FOR PROCEDURE FOR PURPOSES OTHER THAN REMEDYING HEALTH STATE, UNSPECIFIED: Chronic | ICD-10-CM

## 2023-09-14 DIAGNOSIS — Z90.49 ACQUIRED ABSENCE OF OTHER SPECIFIED PARTS OF DIGESTIVE TRACT: Chronic | ICD-10-CM

## 2023-09-14 DIAGNOSIS — Z00.00 ENCOUNTER FOR GENERAL ADULT MEDICAL EXAMINATION WITHOUT ABNORMAL FINDINGS: ICD-10-CM

## 2023-09-14 PROCEDURE — 77063 BREAST TOMOSYNTHESIS BI: CPT | Mod: 26

## 2023-09-14 PROCEDURE — 77080 DXA BONE DENSITY AXIAL: CPT

## 2023-09-14 PROCEDURE — 77067 SCR MAMMO BI INCL CAD: CPT | Mod: 26

## 2023-09-14 PROCEDURE — 77080 DXA BONE DENSITY AXIAL: CPT | Mod: 26

## 2023-09-14 PROCEDURE — 76641 ULTRASOUND BREAST COMPLETE: CPT | Mod: 26,50

## 2023-09-14 PROCEDURE — 77063 BREAST TOMOSYNTHESIS BI: CPT

## 2023-09-14 PROCEDURE — 77067 SCR MAMMO BI INCL CAD: CPT

## 2023-09-14 PROCEDURE — 76641 ULTRASOUND BREAST COMPLETE: CPT

## 2023-09-18 NOTE — CONSULT NOTE ADULT - PROVIDER SPECIALTY LIST ADULT
Patient has questions regarding pain medication. He has been getting constipated. Please advise.  
Spoke with patient, Dr. Rm will not increase pain meds. Recommend add in OTC Tylenol and/or NSAID's for apin. Not to max out more than 3200 mg of Ibuprofen and 3000 mg of Tylenol. Rudi Junior verbalized understanding and appreciation.     
Spoke with patient, he is 1 wk post-op bilateral quad tendon repair. He had a bowel movement 2 days ago and is passing gas. We discussed increasing fluids, fiber, prunes or prune juice. Can take over the counter stool softener as well. Patient states his pain has been increasing as he is moving more. He asked if his pain meds can be increased. He has not tried over the counter meds. Will discuss refill with provider and call patient back. Rudi Junior verbalized understanding and appreciation.     
Hospitalist

## 2023-10-17 ENCOUNTER — OUTPATIENT (OUTPATIENT)
Dept: OUTPATIENT SERVICES | Facility: HOSPITAL | Age: 58
LOS: 1 days | End: 2023-10-17
Payer: COMMERCIAL

## 2023-10-17 VITALS
DIASTOLIC BLOOD PRESSURE: 78 MMHG | TEMPERATURE: 98 F | HEIGHT: 64 IN | HEART RATE: 78 BPM | RESPIRATION RATE: 16 BRPM | WEIGHT: 149.91 LBS | SYSTOLIC BLOOD PRESSURE: 120 MMHG | OXYGEN SATURATION: 100 %

## 2023-10-17 DIAGNOSIS — S96.812A STRAIN OF OTHER SPECIFIED MUSCLES AND TENDONS AT ANKLE AND FOOT LEVEL, LEFT FOOT, INITIAL ENCOUNTER: Chronic | ICD-10-CM

## 2023-10-17 DIAGNOSIS — Z41.9 ENCOUNTER FOR PROCEDURE FOR PURPOSES OTHER THAN REMEDYING HEALTH STATE, UNSPECIFIED: Chronic | ICD-10-CM

## 2023-10-17 DIAGNOSIS — Z98.890 OTHER SPECIFIED POSTPROCEDURAL STATES: Chronic | ICD-10-CM

## 2023-10-17 DIAGNOSIS — K22.2 ESOPHAGEAL OBSTRUCTION: ICD-10-CM

## 2023-10-17 DIAGNOSIS — Z01.818 ENCOUNTER FOR OTHER PREPROCEDURAL EXAMINATION: ICD-10-CM

## 2023-10-17 DIAGNOSIS — K22.70 BARRETT'S ESOPHAGUS WITHOUT DYSPLASIA: ICD-10-CM

## 2023-10-17 DIAGNOSIS — Z90.49 ACQUIRED ABSENCE OF OTHER SPECIFIED PARTS OF DIGESTIVE TRACT: Chronic | ICD-10-CM

## 2023-10-17 DIAGNOSIS — N82.3 FISTULA OF VAGINA TO LARGE INTESTINE: Chronic | ICD-10-CM

## 2023-10-17 DIAGNOSIS — Z98.84 BARIATRIC SURGERY STATUS: Chronic | ICD-10-CM

## 2023-10-17 DIAGNOSIS — N81.10 CYSTOCELE, UNSPECIFIED: Chronic | ICD-10-CM

## 2023-10-17 LAB
A1C WITH ESTIMATED AVERAGE GLUCOSE RESULT: 5.6 % — SIGNIFICANT CHANGE UP (ref 4–5.6)
A1C WITH ESTIMATED AVERAGE GLUCOSE RESULT: 5.6 % — SIGNIFICANT CHANGE UP (ref 4–5.6)
ALBUMIN SERPL ELPH-MCNC: 3.4 G/DL — SIGNIFICANT CHANGE UP (ref 3.3–5)
ALBUMIN SERPL ELPH-MCNC: 3.4 G/DL — SIGNIFICANT CHANGE UP (ref 3.3–5)
ANION GAP SERPL CALC-SCNC: 3 MMOL/L — LOW (ref 5–17)
ANION GAP SERPL CALC-SCNC: 3 MMOL/L — LOW (ref 5–17)
APPEARANCE UR: CLEAR — SIGNIFICANT CHANGE UP
APPEARANCE UR: CLEAR — SIGNIFICANT CHANGE UP
APTT BLD: 33.3 SEC — SIGNIFICANT CHANGE UP (ref 24.5–35.6)
APTT BLD: 33.3 SEC — SIGNIFICANT CHANGE UP (ref 24.5–35.6)
BASOPHILS # BLD AUTO: 0.04 K/UL — SIGNIFICANT CHANGE UP (ref 0–0.2)
BASOPHILS # BLD AUTO: 0.04 K/UL — SIGNIFICANT CHANGE UP (ref 0–0.2)
BASOPHILS NFR BLD AUTO: 0.7 % — SIGNIFICANT CHANGE UP (ref 0–2)
BASOPHILS NFR BLD AUTO: 0.7 % — SIGNIFICANT CHANGE UP (ref 0–2)
BILIRUB UR-MCNC: NEGATIVE — SIGNIFICANT CHANGE UP
BILIRUB UR-MCNC: NEGATIVE — SIGNIFICANT CHANGE UP
BLOOD GAS SOURCE: SIGNIFICANT CHANGE UP
BLOOD GAS SOURCE: SIGNIFICANT CHANGE UP
BUN SERPL-MCNC: 7 MG/DL — SIGNIFICANT CHANGE UP (ref 7–23)
BUN SERPL-MCNC: 7 MG/DL — SIGNIFICANT CHANGE UP (ref 7–23)
CALCIUM SERPL-MCNC: 9.2 MG/DL — SIGNIFICANT CHANGE UP (ref 8.5–10.1)
CALCIUM SERPL-MCNC: 9.2 MG/DL — SIGNIFICANT CHANGE UP (ref 8.5–10.1)
CHLORIDE SERPL-SCNC: 109 MMOL/L — HIGH (ref 96–108)
CHLORIDE SERPL-SCNC: 109 MMOL/L — HIGH (ref 96–108)
CO2 SERPL-SCNC: 31 MMOL/L — SIGNIFICANT CHANGE UP (ref 22–31)
CO2 SERPL-SCNC: 31 MMOL/L — SIGNIFICANT CHANGE UP (ref 22–31)
COHGB MFR BLDV: 1.2 % — SIGNIFICANT CHANGE UP
COHGB MFR BLDV: 1.2 % — SIGNIFICANT CHANGE UP
COLOR SPEC: YELLOW — SIGNIFICANT CHANGE UP
COLOR SPEC: YELLOW — SIGNIFICANT CHANGE UP
CREAT SERPL-MCNC: 0.89 MG/DL — SIGNIFICANT CHANGE UP (ref 0.5–1.3)
CREAT SERPL-MCNC: 0.89 MG/DL — SIGNIFICANT CHANGE UP (ref 0.5–1.3)
DIFF PNL FLD: NEGATIVE — SIGNIFICANT CHANGE UP
DIFF PNL FLD: NEGATIVE — SIGNIFICANT CHANGE UP
EGFR: 76 ML/MIN/1.73M2 — SIGNIFICANT CHANGE UP
EGFR: 76 ML/MIN/1.73M2 — SIGNIFICANT CHANGE UP
EOSINOPHIL # BLD AUTO: 0.09 K/UL — SIGNIFICANT CHANGE UP (ref 0–0.5)
EOSINOPHIL # BLD AUTO: 0.09 K/UL — SIGNIFICANT CHANGE UP (ref 0–0.5)
EOSINOPHIL NFR BLD AUTO: 1.5 % — SIGNIFICANT CHANGE UP (ref 0–6)
EOSINOPHIL NFR BLD AUTO: 1.5 % — SIGNIFICANT CHANGE UP (ref 0–6)
ESTIMATED AVERAGE GLUCOSE: 114 MG/DL — SIGNIFICANT CHANGE UP (ref 68–114)
ESTIMATED AVERAGE GLUCOSE: 114 MG/DL — SIGNIFICANT CHANGE UP (ref 68–114)
GLUCOSE SERPL-MCNC: 101 MG/DL — HIGH (ref 70–99)
GLUCOSE SERPL-MCNC: 101 MG/DL — HIGH (ref 70–99)
GLUCOSE UR QL: NEGATIVE MG/DL — SIGNIFICANT CHANGE UP
GLUCOSE UR QL: NEGATIVE MG/DL — SIGNIFICANT CHANGE UP
HCT VFR BLD CALC: 39 % — SIGNIFICANT CHANGE UP (ref 34.5–45)
HCT VFR BLD CALC: 39 % — SIGNIFICANT CHANGE UP (ref 34.5–45)
HGB BLD-MCNC: 13.1 G/DL — SIGNIFICANT CHANGE UP (ref 11.5–15.5)
HGB BLD-MCNC: 13.1 G/DL — SIGNIFICANT CHANGE UP (ref 11.5–15.5)
IMM GRANULOCYTES NFR BLD AUTO: 0.2 % — SIGNIFICANT CHANGE UP (ref 0–0.9)
IMM GRANULOCYTES NFR BLD AUTO: 0.2 % — SIGNIFICANT CHANGE UP (ref 0–0.9)
INR BLD: 0.98 RATIO — SIGNIFICANT CHANGE UP (ref 0.85–1.18)
INR BLD: 0.98 RATIO — SIGNIFICANT CHANGE UP (ref 0.85–1.18)
KETONES UR-MCNC: NEGATIVE MG/DL — SIGNIFICANT CHANGE UP
KETONES UR-MCNC: NEGATIVE MG/DL — SIGNIFICANT CHANGE UP
LEUKOCYTE ESTERASE UR-ACNC: ABNORMAL
LEUKOCYTE ESTERASE UR-ACNC: ABNORMAL
LYMPHOCYTES # BLD AUTO: 2.25 K/UL — SIGNIFICANT CHANGE UP (ref 1–3.3)
LYMPHOCYTES # BLD AUTO: 2.25 K/UL — SIGNIFICANT CHANGE UP (ref 1–3.3)
LYMPHOCYTES # BLD AUTO: 38.7 % — SIGNIFICANT CHANGE UP (ref 13–44)
LYMPHOCYTES # BLD AUTO: 38.7 % — SIGNIFICANT CHANGE UP (ref 13–44)
MCHC RBC-ENTMCNC: 29.4 PG — SIGNIFICANT CHANGE UP (ref 27–34)
MCHC RBC-ENTMCNC: 29.4 PG — SIGNIFICANT CHANGE UP (ref 27–34)
MCHC RBC-ENTMCNC: 33.6 GM/DL — SIGNIFICANT CHANGE UP (ref 32–36)
MCHC RBC-ENTMCNC: 33.6 GM/DL — SIGNIFICANT CHANGE UP (ref 32–36)
MCV RBC AUTO: 87.4 FL — SIGNIFICANT CHANGE UP (ref 80–100)
MCV RBC AUTO: 87.4 FL — SIGNIFICANT CHANGE UP (ref 80–100)
MONOCYTES # BLD AUTO: 0.4 K/UL — SIGNIFICANT CHANGE UP (ref 0–0.9)
MONOCYTES # BLD AUTO: 0.4 K/UL — SIGNIFICANT CHANGE UP (ref 0–0.9)
MONOCYTES NFR BLD AUTO: 6.9 % — SIGNIFICANT CHANGE UP (ref 2–14)
MONOCYTES NFR BLD AUTO: 6.9 % — SIGNIFICANT CHANGE UP (ref 2–14)
NEUTROPHILS # BLD AUTO: 3.03 K/UL — SIGNIFICANT CHANGE UP (ref 1.8–7.4)
NEUTROPHILS # BLD AUTO: 3.03 K/UL — SIGNIFICANT CHANGE UP (ref 1.8–7.4)
NEUTROPHILS NFR BLD AUTO: 52 % — SIGNIFICANT CHANGE UP (ref 43–77)
NEUTROPHILS NFR BLD AUTO: 52 % — SIGNIFICANT CHANGE UP (ref 43–77)
NITRITE UR-MCNC: NEGATIVE — SIGNIFICANT CHANGE UP
NITRITE UR-MCNC: NEGATIVE — SIGNIFICANT CHANGE UP
PH UR: 5.5 — SIGNIFICANT CHANGE UP (ref 5–8)
PH UR: 5.5 — SIGNIFICANT CHANGE UP (ref 5–8)
PLATELET # BLD AUTO: 202 K/UL — SIGNIFICANT CHANGE UP (ref 150–400)
PLATELET # BLD AUTO: 202 K/UL — SIGNIFICANT CHANGE UP (ref 150–400)
POTASSIUM SERPL-MCNC: 4.3 MMOL/L — SIGNIFICANT CHANGE UP (ref 3.5–5.3)
POTASSIUM SERPL-MCNC: 4.3 MMOL/L — SIGNIFICANT CHANGE UP (ref 3.5–5.3)
POTASSIUM SERPL-SCNC: 4.3 MMOL/L — SIGNIFICANT CHANGE UP (ref 3.5–5.3)
POTASSIUM SERPL-SCNC: 4.3 MMOL/L — SIGNIFICANT CHANGE UP (ref 3.5–5.3)
PROT UR-MCNC: NEGATIVE MG/DL — SIGNIFICANT CHANGE UP
PROT UR-MCNC: NEGATIVE MG/DL — SIGNIFICANT CHANGE UP
PROTHROM AB SERPL-ACNC: 11.1 SEC — SIGNIFICANT CHANGE UP (ref 9.5–13)
PROTHROM AB SERPL-ACNC: 11.1 SEC — SIGNIFICANT CHANGE UP (ref 9.5–13)
RBC # BLD: 4.46 M/UL — SIGNIFICANT CHANGE UP (ref 3.8–5.2)
RBC # BLD: 4.46 M/UL — SIGNIFICANT CHANGE UP (ref 3.8–5.2)
RBC # FLD: 12.1 % — SIGNIFICANT CHANGE UP (ref 10.3–14.5)
RBC # FLD: 12.1 % — SIGNIFICANT CHANGE UP (ref 10.3–14.5)
SODIUM SERPL-SCNC: 143 MMOL/L — SIGNIFICANT CHANGE UP (ref 135–145)
SODIUM SERPL-SCNC: 143 MMOL/L — SIGNIFICANT CHANGE UP (ref 135–145)
SP GR SPEC: 1.02 — SIGNIFICANT CHANGE UP (ref 1–1.03)
SP GR SPEC: 1.02 — SIGNIFICANT CHANGE UP (ref 1–1.03)
UROBILINOGEN FLD QL: 0.2 MG/DL — SIGNIFICANT CHANGE UP (ref 0.2–1)
UROBILINOGEN FLD QL: 0.2 MG/DL — SIGNIFICANT CHANGE UP (ref 0.2–1)
WBC # BLD: 5.82 K/UL — SIGNIFICANT CHANGE UP (ref 3.8–10.5)
WBC # BLD: 5.82 K/UL — SIGNIFICANT CHANGE UP (ref 3.8–10.5)
WBC # FLD AUTO: 5.82 K/UL — SIGNIFICANT CHANGE UP (ref 3.8–10.5)
WBC # FLD AUTO: 5.82 K/UL — SIGNIFICANT CHANGE UP (ref 3.8–10.5)

## 2023-10-17 PROCEDURE — 36415 COLL VENOUS BLD VENIPUNCTURE: CPT

## 2023-10-17 PROCEDURE — 81001 URINALYSIS AUTO W/SCOPE: CPT

## 2023-10-17 PROCEDURE — 86921 COMPATIBILITY TEST INCUBATE: CPT

## 2023-10-17 PROCEDURE — 85025 COMPLETE CBC W/AUTO DIFF WBC: CPT

## 2023-10-17 PROCEDURE — 83036 HEMOGLOBIN GLYCOSYLATED A1C: CPT

## 2023-10-17 PROCEDURE — 82040 ASSAY OF SERUM ALBUMIN: CPT

## 2023-10-17 PROCEDURE — 99214 OFFICE O/P EST MOD 30 MIN: CPT | Mod: 25

## 2023-10-17 PROCEDURE — 86850 RBC ANTIBODY SCREEN: CPT

## 2023-10-17 PROCEDURE — 85730 THROMBOPLASTIN TIME PARTIAL: CPT

## 2023-10-17 PROCEDURE — 86922 COMPATIBILITY TEST ANTIGLOB: CPT

## 2023-10-17 PROCEDURE — 86920 COMPATIBILITY TEST SPIN: CPT

## 2023-10-17 PROCEDURE — 82375 ASSAY CARBOXYHB QUANT: CPT

## 2023-10-17 PROCEDURE — 86900 BLOOD TYPING SEROLOGIC ABO: CPT

## 2023-10-17 PROCEDURE — 86901 BLOOD TYPING SEROLOGIC RH(D): CPT

## 2023-10-17 PROCEDURE — 80048 BASIC METABOLIC PNL TOTAL CA: CPT

## 2023-10-17 PROCEDURE — 85610 PROTHROMBIN TIME: CPT

## 2023-10-17 NOTE — H&P PST ADULT - HISTORY OF PRESENT ILLNESS
56 y/o female with hx of Ahn's esophagus, obesity s/p gastric bypass and multiple stent placement for gastric stricture last done 7/2023. Patient now presents to PST for scheduled revision for robotic gastrojejunostomy  anastomosis on 10/25/23. She reports she only can tolerate food when stent is placed and was advised to have this procedure for long term results.

## 2023-10-17 NOTE — H&P PST ADULT - ASSESSMENT
Patient now presents to [facility], [place] for scheduled revision for robotic gastrojejunostomy  anastomosis on 10/25/23.     CAPRINI SCORE    AGE RELATED RISK FACTORS                                                       MOBILITY RELATED FACTORS  [x ] Age 41-60 years                                            (1 Point)                  [ ] Bed rest                                                        (1 Point)  [ ] Age: 61-74 years                                           (2 Points)                [ ] Plaster cast                                                   (2 Points)  [ ] Age= 75 years                                              (3 Points)                 [ ] Bed bound for more than 72 hours                   (2 Points)    DISEASE RELATED RISK FACTORS                                               GENDER SPECIFIC FACTORS  [ ] Edema in the lower extremities                       (1 Point)                  [ ] Pregnancy                                                     (1 Point)  [ ] Varicose veins                                               (1 Point)                  [ ] Post-partum < 6 weeks                                   (1 Point)             [ ] BMI > 25 Kg/m2                                            (1 Point)                  [ ] Hormonal therapy  or oral contraception            (1 Point)                 [ ] Sepsis (in the previous month)                        (1 Point)                  [ ] History of pregnancy complications  [ ] Pneumonia or serious lung disease                                               [ ] Unexplained or recurrent                       (1 Point)           (in the previous month)                               (1 Point)  [ ] Abnormal pulmonary function test                     (1 Point)                 SURGERY RELATED RISK FACTORS  [ ] Acute myocardial infarction                              (1 Point)                 [ ]  Section                                            (1 Point)  [ ] Congestive heart failure (in the previous month)  (1 Point)                 [ ] Minor surgery                                                 (1 Point)   [ ] Inflammatory bowel disease                             (1 Point)                 [ ] Arthroscopic surgery                                        (2 Points)  [ ] Central venous access                                    (2 Points)                [x ] General surgery lasting more than 45 minutes   (2 Points)       [ ] Stroke (in the previous month)                          (5 Points)               [ ] Elective arthroplasty                                        (5 Points)            [ ] malignancy                                                             (2 points)                                                                                                                                 HEMATOLOGY RELATED FACTORS                                                 TRAUMA RELATED RISK FACTORS  [ ] Prior episodes of VTE                                     (3 Points)                 [ ] Fracture of the hip, pelvis, or leg                       (5 Points)  [ ] Positive family history for VTE                         (3 Points)                 [ ] Acute spinal cord injury (in the previous month)  (5 Points)  [ ] Prothrombin 79692 A                                      (3 Points)                 [ ] Paralysis  (less than 1 month)                          (5 Points)  [ ] Factor V Leiden                                             (3 Points)                 [ ] Multiple Trauma within 1 month                         (5 Points)  [ ] Lupus anticoagulants                                     (3 Points)                                                           [ ] Anticardiolipin antibodies                                (3 Points)                                                       [ ] High homocysteine in the blood                      (3 Points)                                             [ ] Other congenital or acquired thrombophilia       (3 Points)                                                [ ] Heparin induced thrombocytopenia                  (3 Points)                                          Total Score [     3     ]  The Caprini score indicates this patient is at risk for a VTE event (score 3-5).  Most surgical patients in this group would benefit from pharmacologic prophylaxis.  The surgical team will determine the balance between VTE risk and bleeding risk

## 2023-10-17 NOTE — H&P PST ADULT - PROBLEM SELECTOR PLAN 1
Pre op and chlorhexidine instructions given and explained.  Avoid NSAIDs and OTC supplements.   Patient verbalized understanding  medical consult requested by surgeon- pending

## 2023-10-18 DIAGNOSIS — Z01.818 ENCOUNTER FOR OTHER PREPROCEDURAL EXAMINATION: ICD-10-CM

## 2023-10-18 DIAGNOSIS — K22.70 BARRETT'S ESOPHAGUS WITHOUT DYSPLASIA: ICD-10-CM

## 2023-10-19 RX ORDER — OXYCODONE HYDROCHLORIDE 5 MG/1
5 TABLET ORAL EVERY 4 HOURS
Refills: 0 | Status: DISCONTINUED | OUTPATIENT
Start: 2023-10-25 | End: 2023-10-26

## 2023-10-19 RX ORDER — SODIUM CHLORIDE 9 MG/ML
1000 INJECTION, SOLUTION INTRAVENOUS
Refills: 0 | Status: DISCONTINUED | OUTPATIENT
Start: 2023-10-25 | End: 2023-10-26

## 2023-10-19 RX ORDER — OXYCODONE HYDROCHLORIDE 5 MG/1
10 TABLET ORAL EVERY 4 HOURS
Refills: 0 | Status: DISCONTINUED | OUTPATIENT
Start: 2023-10-25 | End: 2023-10-26

## 2023-10-19 RX ORDER — ONDANSETRON 8 MG/1
4 TABLET, FILM COATED ORAL EVERY 6 HOURS
Refills: 0 | Status: DISCONTINUED | OUTPATIENT
Start: 2023-10-25 | End: 2023-10-26

## 2023-10-19 RX ORDER — ENOXAPARIN SODIUM 100 MG/ML
40 INJECTION SUBCUTANEOUS EVERY 12 HOURS
Refills: 0 | Status: DISCONTINUED | OUTPATIENT
Start: 2023-10-25 | End: 2023-10-26

## 2023-10-19 RX ORDER — PANTOPRAZOLE SODIUM 20 MG/1
40 TABLET, DELAYED RELEASE ORAL EVERY 24 HOURS
Refills: 0 | Status: DISCONTINUED | OUTPATIENT
Start: 2023-10-25 | End: 2023-10-26

## 2023-10-25 ENCOUNTER — TRANSCRIPTION ENCOUNTER (OUTPATIENT)
Age: 58
End: 2023-10-25

## 2023-10-25 ENCOUNTER — INPATIENT (INPATIENT)
Facility: HOSPITAL | Age: 58
LOS: 0 days | Discharge: HOME | DRG: 327 | End: 2023-10-26
Attending: SURGERY | Admitting: SURGERY
Payer: COMMERCIAL

## 2023-10-25 VITALS
DIASTOLIC BLOOD PRESSURE: 73 MMHG | TEMPERATURE: 99 F | HEART RATE: 78 BPM | WEIGHT: 149.91 LBS | SYSTOLIC BLOOD PRESSURE: 140 MMHG | HEIGHT: 64 IN | OXYGEN SATURATION: 98 % | RESPIRATION RATE: 16 BRPM

## 2023-10-25 DIAGNOSIS — Z98.890 OTHER SPECIFIED POSTPROCEDURAL STATES: Chronic | ICD-10-CM
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
## 2023-10-25 DIAGNOSIS — N81.10 CYSTOCELE, UNSPECIFIED: Chronic | ICD-10-CM

## 2023-10-25 DIAGNOSIS — I10 ESSENTIAL (PRIMARY) HYPERTENSION: ICD-10-CM

## 2023-10-25 DIAGNOSIS — K44.9 DIAPHRAGMATIC HERNIA WITHOUT OBSTRUCTION OR GANGRENE: ICD-10-CM

## 2023-10-25 DIAGNOSIS — Z41.9 ENCOUNTER FOR PROCEDURE FOR PURPOSES OTHER THAN REMEDYING HEALTH STATE, UNSPECIFIED: Chronic | ICD-10-CM

## 2023-10-25 DIAGNOSIS — K56.50 INTESTINAL ADHESIONS [BANDS], UNSPECIFIED AS TO PARTIAL VERSUS COMPLETE OBSTRUCTION: ICD-10-CM
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
## 2023-10-25 DIAGNOSIS — K22.70 BARRETT'S ESOPHAGUS WITHOUT DYSPLASIA: ICD-10-CM

## 2023-10-25 DIAGNOSIS — Z90.49 ACQUIRED ABSENCE OF OTHER SPECIFIED PARTS OF DIGESTIVE TRACT: Chronic | ICD-10-CM

## 2023-10-25 DIAGNOSIS — E78.5 HYPERLIPIDEMIA, UNSPECIFIED: ICD-10-CM

## 2023-10-25 DIAGNOSIS — K22.2 ESOPHAGEAL OBSTRUCTION: ICD-10-CM

## 2023-10-25 DIAGNOSIS — K95.89 OTHER COMPLICATIONS OF OTHER BARIATRIC PROCEDURE: ICD-10-CM

## 2023-10-25 DIAGNOSIS — Z98.84 BARIATRIC SURGERY STATUS: Chronic | ICD-10-CM

## 2023-10-25 DIAGNOSIS — N82.3 FISTULA OF VAGINA TO LARGE INTESTINE: Chronic | ICD-10-CM

## 2023-10-25 DIAGNOSIS — S96.812A STRAIN OF OTHER SPECIFIED MUSCLES AND TENDONS AT ANKLE AND FOOT LEVEL, LEFT FOOT, INITIAL ENCOUNTER: Chronic | ICD-10-CM

## 2023-10-25 LAB
GLUCOSE BLDC GLUCOMTR-MCNC: 127 MG/DL — HIGH (ref 70–99)
GLUCOSE BLDC GLUCOMTR-MCNC: 127 MG/DL — HIGH (ref 70–99)
GLUCOSE BLDC GLUCOMTR-MCNC: 160 MG/DL — HIGH (ref 70–99)
GLUCOSE BLDC GLUCOMTR-MCNC: 160 MG/DL — HIGH (ref 70–99)
GLUCOSE BLDC GLUCOMTR-MCNC: 94 MG/DL — SIGNIFICANT CHANGE UP (ref 70–99)
GLUCOSE BLDC GLUCOMTR-MCNC: 94 MG/DL — SIGNIFICANT CHANGE UP (ref 70–99)

## 2023-10-25 PROCEDURE — 80048 BASIC METABOLIC PNL TOTAL CA: CPT

## 2023-10-25 PROCEDURE — 74240 X-RAY XM UPR GI TRC 1CNTRST: CPT

## 2023-10-25 PROCEDURE — C9113: CPT

## 2023-10-25 PROCEDURE — 36415 COLL VENOUS BLD VENIPUNCTURE: CPT

## 2023-10-25 PROCEDURE — 90686 IIV4 VACC NO PRSV 0.5 ML IM: CPT

## 2023-10-25 PROCEDURE — S2900: CPT

## 2023-10-25 PROCEDURE — 82962 GLUCOSE BLOOD TEST: CPT

## 2023-10-25 PROCEDURE — 85025 COMPLETE CBC W/AUTO DIFF WBC: CPT

## 2023-10-25 PROCEDURE — C9399: CPT

## 2023-10-25 PROCEDURE — 99253 IP/OBS CNSLTJ NEW/EST LOW 45: CPT

## 2023-10-25 PROCEDURE — 90471 IMMUNIZATION ADMIN: CPT

## 2023-10-25 PROCEDURE — C1889: CPT

## 2023-10-25 PROCEDURE — C9290: CPT

## 2023-10-25 RX ORDER — INFLUENZA VIRUS VACCINE 15; 15; 15; 15 UG/.5ML; UG/.5ML; UG/.5ML; UG/.5ML
0.5 SUSPENSION INTRAMUSCULAR ONCE
Refills: 0 | Status: COMPLETED | OUTPATIENT
Start: 2023-10-25 | End: 2023-10-26

## 2023-10-25 RX ORDER — OXYCODONE HYDROCHLORIDE 5 MG/1
10 TABLET ORAL ONCE
Refills: 0 | Status: DISCONTINUED | OUTPATIENT
Start: 2023-10-25 | End: 2023-10-25

## 2023-10-25 RX ORDER — KETOROLAC TROMETHAMINE 30 MG/ML
30 SYRINGE (ML) INJECTION EVERY 6 HOURS
Refills: 0 | Status: DISCONTINUED | OUTPATIENT
Start: 2023-10-25 | End: 2023-10-26

## 2023-10-25 RX ORDER — SODIUM CHLORIDE 9 MG/ML
1000 INJECTION, SOLUTION INTRAVENOUS
Refills: 0 | Status: DISCONTINUED | OUTPATIENT
Start: 2023-10-25 | End: 2023-10-25

## 2023-10-25 RX ORDER — HEPARIN SODIUM 5000 [USP'U]/ML
5000 INJECTION INTRAVENOUS; SUBCUTANEOUS ONCE
Refills: 0 | Status: COMPLETED | OUTPATIENT
Start: 2023-10-25 | End: 2023-10-25

## 2023-10-25 RX ORDER — ONDANSETRON 8 MG/1
4 TABLET, FILM COATED ORAL ONCE
Refills: 0 | Status: DISCONTINUED | OUTPATIENT
Start: 2023-10-25 | End: 2023-10-25

## 2023-10-25 RX ORDER — HYDROMORPHONE HYDROCHLORIDE 2 MG/ML
0.5 INJECTION INTRAMUSCULAR; INTRAVENOUS; SUBCUTANEOUS
Refills: 0 | Status: DISCONTINUED | OUTPATIENT
Start: 2023-10-25 | End: 2023-10-25

## 2023-10-25 RX ORDER — APREPITANT 80 MG/1
80 CAPSULE ORAL ONCE
Refills: 0 | Status: COMPLETED | OUTPATIENT
Start: 2023-10-25 | End: 2023-10-25

## 2023-10-25 RX ORDER — ACETAMINOPHEN 500 MG
1000 TABLET ORAL ONCE
Refills: 0 | Status: COMPLETED | OUTPATIENT
Start: 2023-10-25 | End: 2023-10-25

## 2023-10-25 RX ADMIN — Medication 30 MILLIGRAM(S): at 22:13

## 2023-10-25 RX ADMIN — ENOXAPARIN SODIUM 40 MILLIGRAM(S): 100 INJECTION SUBCUTANEOUS at 22:13

## 2023-10-25 RX ADMIN — APREPITANT 80 MILLIGRAM(S): 80 CAPSULE ORAL at 10:33

## 2023-10-25 RX ADMIN — HYDROMORPHONE HYDROCHLORIDE 0.5 MILLIGRAM(S): 2 INJECTION INTRAMUSCULAR; INTRAVENOUS; SUBCUTANEOUS at 16:15

## 2023-10-25 RX ADMIN — HYDROMORPHONE HYDROCHLORIDE 0.5 MILLIGRAM(S): 2 INJECTION INTRAMUSCULAR; INTRAVENOUS; SUBCUTANEOUS at 18:33

## 2023-10-25 RX ADMIN — HYDROMORPHONE HYDROCHLORIDE 0.5 MILLIGRAM(S): 2 INJECTION INTRAMUSCULAR; INTRAVENOUS; SUBCUTANEOUS at 16:00

## 2023-10-25 RX ADMIN — Medication 400 MILLIGRAM(S): at 22:12

## 2023-10-25 RX ADMIN — Medication 1000 MILLIGRAM(S): at 22:12

## 2023-10-25 RX ADMIN — HYDROMORPHONE HYDROCHLORIDE 0.5 MILLIGRAM(S): 2 INJECTION INTRAMUSCULAR; INTRAVENOUS; SUBCUTANEOUS at 20:30

## 2023-10-25 RX ADMIN — OXYCODONE HYDROCHLORIDE 10 MILLIGRAM(S): 5 TABLET ORAL at 22:13

## 2023-10-25 RX ADMIN — OXYCODONE HYDROCHLORIDE 10 MILLIGRAM(S): 5 TABLET ORAL at 16:10

## 2023-10-25 RX ADMIN — SODIUM CHLORIDE 150 MILLILITER(S): 9 INJECTION, SOLUTION INTRAVENOUS at 15:35

## 2023-10-25 RX ADMIN — HYDROMORPHONE HYDROCHLORIDE 0.5 MILLIGRAM(S): 2 INJECTION INTRAMUSCULAR; INTRAVENOUS; SUBCUTANEOUS at 20:05

## 2023-10-25 RX ADMIN — HYDROMORPHONE HYDROCHLORIDE 0.5 MILLIGRAM(S): 2 INJECTION INTRAMUSCULAR; INTRAVENOUS; SUBCUTANEOUS at 15:35

## 2023-10-25 RX ADMIN — OXYCODONE HYDROCHLORIDE 10 MILLIGRAM(S): 5 TABLET ORAL at 17:00

## 2023-10-25 RX ADMIN — PANTOPRAZOLE SODIUM 40 MILLIGRAM(S): 20 TABLET, DELAYED RELEASE ORAL at 15:35

## 2023-10-25 RX ADMIN — OXYCODONE HYDROCHLORIDE 10 MILLIGRAM(S): 5 TABLET ORAL at 22:43

## 2023-10-25 RX ADMIN — HEPARIN SODIUM 5000 UNIT(S): 5000 INJECTION INTRAVENOUS; SUBCUTANEOUS at 10:33

## 2023-10-25 RX ADMIN — HYDROMORPHONE HYDROCHLORIDE 0.5 MILLIGRAM(S): 2 INJECTION INTRAMUSCULAR; INTRAVENOUS; SUBCUTANEOUS at 19:05

## 2023-10-25 NOTE — CONSULT NOTE ADULT - SUBJECTIVE AND OBJECTIVE BOX
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 Patient underwent robot gastrotomy repair with ROZINA, Hospitalist consulted for postop medical management    PMH- as above  PSH  Cystocele with rectocele 5/11/2017  Elective surgery removal of hepatic adenoma 2006  H/O elbow surgery   H/O endoscopy   H/O gastric bypass 3/2022  H/O hernia repair   Hepatic adenoma resected 2006  History of bilateral breast reduction surgery   History of biliary duct stent placement   History of cholecystectomy   History of rectal surgery 2013 interstimulator for rectal incontinence  later removed 2016  History of vaginal surgery   Rectovaginal fistula surgery - 07/2017  S/P appendectomy 1979  S/P colonoscopy multiple  S/P exploratory laparotomy cholecystectomy, ROZINA, endometriosis  S/P foot surgery, right   S/P hysterectomy with oophorectomy with mesh for stress incontinence  S/P LASIK surgery of both eyes 2000  S/P Nissen fundoplication (with gastrostomy tube placement) for barrets esophagus 2006  S/P tonsillectomy 1979  Stress incontinence, female right side of mesh "cut" adjusted  Tendon rupture, post-op repaired right elbow 2011  Traumatic rupture of plantar fascia of left foot, initial encounter surgery.     Soc hx- denies smoking, no alcohol    FAMILY HISTORY:  Father  Still living? No  Family history of heart attack, Age at diagnosis: Age Unknown  Family history of hypertension, Age at diagnosis: Age Unknown    Mother  Still living? No  Family history of hypertension, Age at diagnosis: Age Unknown  Family history of uterine cancer, Age at diagnosis: Age Unknown    Sibling  Still living? Unknown  FHx: bladder cancer, Age at diagnosis: Age Unknown.    10/25/23- seen postop in PACU, some minimal gas pains    Review of system- All 10 systems reviewed and is as per HPI otherwise negative.     T(C): 36.5 (10-25-23 @ 15:15), Max: 37.2 (10-25-23 @ 10:03)  HR: 104 (10-25-23 @ 18:00) (78 - 104)  BP: 130/70 (10-25-23 @ 18:00) (124/62 - 140/73)  RR: 14 (10-25-23 @ 18:00) (11 - 16)  SpO2: 99% (10-25-23 @ 18:00) (98% - 100%)  Wt(kg): --    LABS:    CAPILLARY BLOOD GLUCOSE  POCT Blood Glucose.: 127 mg/dL (25 Oct 2023 15:15)  POCT Blood Glucose.: 94 mg/dL (25 Oct 2023 10:09)    RADIOLOGY & ADDITIONAL TESTS:      PHYSICAL EXAM:  GENERAL: NAD, well-groomed, well-developed  HEAD:  Atraumatic, Normocephalic  EYES: EOMI, PERRLA, conjunctiva and sclera clear  HEENT: Moist mucous membranes  NECK: Supple, No JVD  NERVOUS SYSTEM:  Alert & Oriented X3, Motor Strength 5/5 B/L upper and lower extremities; DTRs 2+ intact and symmetric  CHEST/LUNG: Clear to auscultation bilaterally; No rales, rhonchi, wheezing, or rubs  HEART: Regular rate and rhythm; No murmurs, rubs, or gallops  ABDOMEN: Soft, Nontender, Nondistended; Bowel sounds present, robotic sites are clean  GENITOURINARY- Voiding, no palpable bladder  EXTREMITIES:  2+ Peripheral Pulses, No clubbing, cyanosis, or edema  MUSCULOSKELTAL- No muscle tenderness, Muscle tone normal, No joint tenderness, no Joint swelling, Joint range of motion-normal  SKIN-no rash, no lesion  CNS- alert, oriented X3, non focal     Daily Height in cm: 162.56 (25 Oct 2023 10:03)      HYDROmorphone  Injectable 0.5 milliGRAM(s) IV Push every 10 minutes PRN  influenza   Vaccine 0.5 milliLiter(s) IntraMuscular once  lactated ringers. 1000 milliLiter(s) IV Continuous <Continuous>  lactated ringers. 1000 milliLiter(s) IV Continuous <Continuous>  LORazepam   Injectable 0.5 milliGRAM(s) IV Push once PRN  ondansetron Injectable 4 milliGRAM(s) IV Push once PRN  pantoprazole  Injectable 40 milliGRAM(s) IV Push every 24 hours    Assessment/Plan  #Gastrojejunal stricture s/p robotic gastrotomy repair with ROZINA  Surg team following  Tr elaine per protocol  Pain meds prn  PPI  Monitor postop HH  Incentive spirometry  OOB to ambulate    #DVT proph- per surg team    #Dispo- thank you for consult, will follow

## 2023-10-25 NOTE — BRIEF OPERATIVE NOTE - NSICDXBRIEFPROCEDURE_GEN_ALL_CORE_FT
PROCEDURES:  Closure of gastrotomy 25-Oct-2023 15:24:28  Mami Evans  Laparoscopic lysis of abdominal adhesions 25-Oct-2023 15:25:49 Robotic assisted Mmai Evans  EGD, intraoperative 25-Oct-2023 15:26:01  Mami Evans

## 2023-10-25 NOTE — PATIENT PROFILE ADULT - FALL HARM RISK - UNIVERSAL INTERVENTIONS
Bed in lowest position, wheels locked, appropriate side rails in place/Call bell, personal items and telephone in reach/Instruct patient to call for assistance before getting out of bed or chair/Non-slip footwear when patient is out of bed/[place] to call system/Physically safe environment - no spills, clutter or unnecessary equipment/Purposeful Proactive Rounding/Room/bathroom lighting operational, light cord in reach

## 2023-10-26 ENCOUNTER — TRANSCRIPTION ENCOUNTER (OUTPATIENT)
Age: 58
End: 2023-10-26

## 2023-10-26 VITALS
SYSTOLIC BLOOD PRESSURE: 140 MMHG | RESPIRATION RATE: 18 BRPM | DIASTOLIC BLOOD PRESSURE: 69 MMHG | TEMPERATURE: 98 F | HEART RATE: 86 BPM | OXYGEN SATURATION: 96 %

## 2023-10-26 DIAGNOSIS — Z98.84 BARIATRIC SURGERY STATUS: ICD-10-CM

## 2023-10-26 LAB
ANION GAP SERPL CALC-SCNC: 6 MMOL/L — SIGNIFICANT CHANGE UP (ref 5–17)
ANION GAP SERPL CALC-SCNC: 6 MMOL/L — SIGNIFICANT CHANGE UP (ref 5–17)
BASOPHILS # BLD AUTO: 0.03 K/UL — SIGNIFICANT CHANGE UP (ref 0–0.2)
BASOPHILS # BLD AUTO: 0.03 K/UL — SIGNIFICANT CHANGE UP (ref 0–0.2)
BASOPHILS NFR BLD AUTO: 0.2 % — SIGNIFICANT CHANGE UP (ref 0–2)
BASOPHILS NFR BLD AUTO: 0.2 % — SIGNIFICANT CHANGE UP (ref 0–2)
BUN SERPL-MCNC: 9 MG/DL — SIGNIFICANT CHANGE UP (ref 7–23)
BUN SERPL-MCNC: 9 MG/DL — SIGNIFICANT CHANGE UP (ref 7–23)
CALCIUM SERPL-MCNC: 8.8 MG/DL — SIGNIFICANT CHANGE UP (ref 8.5–10.1)
CALCIUM SERPL-MCNC: 8.8 MG/DL — SIGNIFICANT CHANGE UP (ref 8.5–10.1)
CHLORIDE SERPL-SCNC: 106 MMOL/L — SIGNIFICANT CHANGE UP (ref 96–108)
CHLORIDE SERPL-SCNC: 106 MMOL/L — SIGNIFICANT CHANGE UP (ref 96–108)
CO2 SERPL-SCNC: 28 MMOL/L — SIGNIFICANT CHANGE UP (ref 22–31)
CO2 SERPL-SCNC: 28 MMOL/L — SIGNIFICANT CHANGE UP (ref 22–31)
CREAT SERPL-MCNC: 0.75 MG/DL — SIGNIFICANT CHANGE UP (ref 0.5–1.3)
CREAT SERPL-MCNC: 0.75 MG/DL — SIGNIFICANT CHANGE UP (ref 0.5–1.3)
EGFR: 93 ML/MIN/1.73M2 — SIGNIFICANT CHANGE UP
EGFR: 93 ML/MIN/1.73M2 — SIGNIFICANT CHANGE UP
EOSINOPHIL # BLD AUTO: 0 K/UL — SIGNIFICANT CHANGE UP (ref 0–0.5)
EOSINOPHIL # BLD AUTO: 0 K/UL — SIGNIFICANT CHANGE UP (ref 0–0.5)
EOSINOPHIL NFR BLD AUTO: 0 % — SIGNIFICANT CHANGE UP (ref 0–6)
EOSINOPHIL NFR BLD AUTO: 0 % — SIGNIFICANT CHANGE UP (ref 0–6)
GLUCOSE BLDC GLUCOMTR-MCNC: 113 MG/DL — HIGH (ref 70–99)
GLUCOSE BLDC GLUCOMTR-MCNC: 113 MG/DL — HIGH (ref 70–99)
GLUCOSE SERPL-MCNC: 104 MG/DL — HIGH (ref 70–99)
GLUCOSE SERPL-MCNC: 104 MG/DL — HIGH (ref 70–99)
HCT VFR BLD CALC: 36.8 % — SIGNIFICANT CHANGE UP (ref 34.5–45)
HCT VFR BLD CALC: 36.8 % — SIGNIFICANT CHANGE UP (ref 34.5–45)
HGB BLD-MCNC: 12.4 G/DL — SIGNIFICANT CHANGE UP (ref 11.5–15.5)
HGB BLD-MCNC: 12.4 G/DL — SIGNIFICANT CHANGE UP (ref 11.5–15.5)
IMM GRANULOCYTES NFR BLD AUTO: 0.3 % — SIGNIFICANT CHANGE UP (ref 0–0.9)
IMM GRANULOCYTES NFR BLD AUTO: 0.3 % — SIGNIFICANT CHANGE UP (ref 0–0.9)
LYMPHOCYTES # BLD AUTO: 1.03 K/UL — SIGNIFICANT CHANGE UP (ref 1–3.3)
LYMPHOCYTES # BLD AUTO: 1.03 K/UL — SIGNIFICANT CHANGE UP (ref 1–3.3)
LYMPHOCYTES # BLD AUTO: 7.5 % — LOW (ref 13–44)
LYMPHOCYTES # BLD AUTO: 7.5 % — LOW (ref 13–44)
MCHC RBC-ENTMCNC: 29.6 PG — SIGNIFICANT CHANGE UP (ref 27–34)
MCHC RBC-ENTMCNC: 29.6 PG — SIGNIFICANT CHANGE UP (ref 27–34)
MCHC RBC-ENTMCNC: 33.7 GM/DL — SIGNIFICANT CHANGE UP (ref 32–36)
MCHC RBC-ENTMCNC: 33.7 GM/DL — SIGNIFICANT CHANGE UP (ref 32–36)
MCV RBC AUTO: 87.8 FL — SIGNIFICANT CHANGE UP (ref 80–100)
MCV RBC AUTO: 87.8 FL — SIGNIFICANT CHANGE UP (ref 80–100)
MONOCYTES # BLD AUTO: 0.83 K/UL — SIGNIFICANT CHANGE UP (ref 0–0.9)
MONOCYTES # BLD AUTO: 0.83 K/UL — SIGNIFICANT CHANGE UP (ref 0–0.9)
MONOCYTES NFR BLD AUTO: 6.1 % — SIGNIFICANT CHANGE UP (ref 2–14)
MONOCYTES NFR BLD AUTO: 6.1 % — SIGNIFICANT CHANGE UP (ref 2–14)
NEUTROPHILS # BLD AUTO: 11.76 K/UL — HIGH (ref 1.8–7.4)
NEUTROPHILS # BLD AUTO: 11.76 K/UL — HIGH (ref 1.8–7.4)
NEUTROPHILS NFR BLD AUTO: 85.9 % — HIGH (ref 43–77)
NEUTROPHILS NFR BLD AUTO: 85.9 % — HIGH (ref 43–77)
PLATELET # BLD AUTO: 199 K/UL — SIGNIFICANT CHANGE UP (ref 150–400)
PLATELET # BLD AUTO: 199 K/UL — SIGNIFICANT CHANGE UP (ref 150–400)
POTASSIUM SERPL-MCNC: 4.2 MMOL/L — SIGNIFICANT CHANGE UP (ref 3.5–5.3)
POTASSIUM SERPL-MCNC: 4.2 MMOL/L — SIGNIFICANT CHANGE UP (ref 3.5–5.3)
POTASSIUM SERPL-SCNC: 4.2 MMOL/L — SIGNIFICANT CHANGE UP (ref 3.5–5.3)
POTASSIUM SERPL-SCNC: 4.2 MMOL/L — SIGNIFICANT CHANGE UP (ref 3.5–5.3)
RBC # BLD: 4.19 M/UL — SIGNIFICANT CHANGE UP (ref 3.8–5.2)
RBC # BLD: 4.19 M/UL — SIGNIFICANT CHANGE UP (ref 3.8–5.2)
RBC # FLD: 12.3 % — SIGNIFICANT CHANGE UP (ref 10.3–14.5)
RBC # FLD: 12.3 % — SIGNIFICANT CHANGE UP (ref 10.3–14.5)
SODIUM SERPL-SCNC: 140 MMOL/L — SIGNIFICANT CHANGE UP (ref 135–145)
SODIUM SERPL-SCNC: 140 MMOL/L — SIGNIFICANT CHANGE UP (ref 135–145)
WBC # BLD: 13.69 K/UL — HIGH (ref 3.8–10.5)
WBC # BLD: 13.69 K/UL — HIGH (ref 3.8–10.5)
WBC # FLD AUTO: 13.69 K/UL — HIGH (ref 3.8–10.5)
WBC # FLD AUTO: 13.69 K/UL — HIGH (ref 3.8–10.5)

## 2023-10-26 PROCEDURE — 74240 X-RAY XM UPR GI TRC 1CNTRST: CPT | Mod: 26

## 2023-10-26 PROCEDURE — 99232 SBSQ HOSP IP/OBS MODERATE 35: CPT

## 2023-10-26 RX ORDER — ACETAMINOPHEN 500 MG
1000 TABLET ORAL EVERY 6 HOURS
Refills: 0 | Status: DISCONTINUED | OUTPATIENT
Start: 2023-10-26 | End: 2023-10-26

## 2023-10-26 RX ORDER — CYCLOBENZAPRINE HYDROCHLORIDE 10 MG/1
5 TABLET, FILM COATED ORAL ONCE
Refills: 0 | Status: COMPLETED | OUTPATIENT
Start: 2023-10-26 | End: 2023-10-26

## 2023-10-26 RX ORDER — ACETAMINOPHEN 500 MG
1000 TABLET ORAL EVERY 6 HOURS
Refills: 0 | Status: COMPLETED | OUTPATIENT
Start: 2023-10-26 | End: 2023-10-26

## 2023-10-26 RX ORDER — OXYCODONE HYDROCHLORIDE 5 MG/1
1 TABLET ORAL
Qty: 0 | Refills: 0 | DISCHARGE

## 2023-10-26 RX ORDER — GABAPENTIN 400 MG/1
300 CAPSULE ORAL THREE TIMES A DAY
Refills: 0 | Status: DISCONTINUED | OUTPATIENT
Start: 2023-10-26 | End: 2023-10-26

## 2023-10-26 RX ADMIN — Medication 0.5 MILLIGRAM(S): at 00:13

## 2023-10-26 RX ADMIN — Medication 30 MILLIGRAM(S): at 05:15

## 2023-10-26 RX ADMIN — ENOXAPARIN SODIUM 40 MILLIGRAM(S): 100 INJECTION SUBCUTANEOUS at 09:47

## 2023-10-26 RX ADMIN — OXYCODONE HYDROCHLORIDE 10 MILLIGRAM(S): 5 TABLET ORAL at 11:09

## 2023-10-26 RX ADMIN — OXYCODONE HYDROCHLORIDE 10 MILLIGRAM(S): 5 TABLET ORAL at 12:00

## 2023-10-26 RX ADMIN — Medication 30 MILLIGRAM(S): at 11:09

## 2023-10-26 RX ADMIN — Medication 400 MILLIGRAM(S): at 09:46

## 2023-10-26 RX ADMIN — INFLUENZA VIRUS VACCINE 0.5 MILLILITER(S): 15; 15; 15; 15 SUSPENSION INTRAMUSCULAR at 12:00

## 2023-10-26 RX ADMIN — Medication 1000 MILLIGRAM(S): at 10:00

## 2023-10-26 RX ADMIN — OXYCODONE HYDROCHLORIDE 10 MILLIGRAM(S): 5 TABLET ORAL at 03:22

## 2023-10-26 RX ADMIN — PANTOPRAZOLE SODIUM 40 MILLIGRAM(S): 20 TABLET, DELAYED RELEASE ORAL at 09:47

## 2023-10-26 RX ADMIN — OXYCODONE HYDROCHLORIDE 10 MILLIGRAM(S): 5 TABLET ORAL at 02:52

## 2023-10-26 RX ADMIN — CYCLOBENZAPRINE HYDROCHLORIDE 5 MILLIGRAM(S): 10 TABLET, FILM COATED ORAL at 02:50

## 2023-10-26 RX ADMIN — SODIUM CHLORIDE 150 MILLILITER(S): 9 INJECTION, SOLUTION INTRAVENOUS at 05:15

## 2023-10-26 RX ADMIN — Medication 30 MILLIGRAM(S): at 11:10

## 2023-10-26 NOTE — PROGRESS NOTE ADULT - PROBLEM SELECTOR PLAN 1
The patient is s/p Laparoscopic robotic assisted revision of gastric bypass, GJ anastomosis, ROZINA and doing very well. All discharge instructions were given to the patient, as well as potential signs of complications.  The patient will follow up in 2 weeks.  [facility]

## 2023-10-26 NOTE — PROGRESS NOTE ADULT - ASSESSMENT
Patient is a 56 yo F that is s/p laparoscopic robotic assisted revision of gastric bypass, GJ anastomosis, ROZINA

## 2023-10-26 NOTE — DISCHARGE NOTE PROVIDER - NSDCCPTREATMENT_GEN_ALL_CORE_FT
PRINCIPAL PROCEDURE  Procedure: Robot-assisted revision of gastric bypass  Findings and Treatment:

## 2023-10-26 NOTE — DISCHARGE NOTE NURSING/CASE MANAGEMENT/SOCIAL WORK - NSDCVIVACCINE_GEN_ALL_CORE_FT
influenza, injectable, quadrivalent, preservative free; 04-Jan-2023 16:37; Chloe Murillo (RN); [employer]; [id number] (Exp. Date: 30-Jun-2023); IntraMuscular; Deltoid Left.; 0.5 milliLiter(s); VIS (VIS Published: 06-Aug-2021, VIS Presented: 04-Jan-2023);   influenza, injectable, quadrivalent, preservative free; 26-Oct-2023 12:00; Merced Jones (STANISLAV); [employer]; [id number] (Exp. Date: 26-Jul-2024); IntraMuscular; Deltoid Left.; 0.5 milliLiter(s); VIS (VIS Published: 06-Aug-2021, VIS Presented: 26-Oct-2023);

## 2023-10-26 NOTE — DISCHARGE NOTE PROVIDER - NSDCFUSCHEDAPPT_GEN_ALL_CORE_FT
[facility]  [facility]  Scheduled Appointment: 11/08/2023

## 2023-10-26 NOTE — PROGRESS NOTE ADULT - SUBJECTIVE AND OBJECTIVE BOX
Post Op Day#: 1  Procedure:  Laparoscopic robotic assisted revision of gastric bypass, GJ anastomosis, ROZINA  The patient is doing well without complaints.    Vital Signs Last 24 Hrs  T(C): 36.8 (26 Oct 2023 08:00), Max: 37.3 (25 Oct 2023 21:22)  T(F): 98.2 (26 Oct 2023 08:00), Max: 99.1 (25 Oct 2023 21:22)  HR: 85 (26 Oct 2023 08:00) (85 - 110)  BP: 124/68 (26 Oct 2023 08:00) (121/66 - 143/72)  BP(mean): --  RR: 18 (26 Oct 2023 08:00) (11 - 20)  SpO2: 99% (26 Oct 2023 08:00) (92% - 100%)    Parameters below as of 26 Oct 2023 08:00  Patient On (Oxygen Delivery Method): room air        PHYSICAL EXAM:  General: NAD.  HEENT: no JVD, no jaundice.  LUNGS: CTAB.  Heart: S1 S2 RRR  Abd: soft nt/nd   Wounds: clean dry and intact                          12.4   13.69 )-----------( 199      ( 26 Oct 2023 07:49 )             36.8       10-26    140  |  106  |  9   ----------------------------<  104<H>  4.2   |  28  |  0.75    Ca    8.8      26 Oct 2023 07:49          
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 Patient underwent robot gastrotomy repair with ROZINA, Hospitalist consulted for postop medical management.     pt seen and examined. Doing well. Tolerating liquids. Ambulating without difficulty. no dizziness/lighthededness. voiding. no sob/chest pain.       Review of system- All 10 systems reviewed and is as per HPI otherwise negative.     Vital Signs Last 24 Hrs  T(C): 36.9 (26 Oct 2023 12:00), Max: 37.3 (25 Oct 2023 21:22)  T(F): 98.4 (26 Oct 2023 12:00), Max: 99.1 (25 Oct 2023 21:22)  HR: 86 (26 Oct 2023 12:00) (85 - 110)  BP: 140/69 (26 Oct 2023 12:00) (121/66 - 143/72)  RR: 18 (26 Oct 2023 12:00) (11 - 20)  SpO2: 96% (26 Oct 2023 12:00) (92% - 100%)    Parameters below as of 26 Oct 2023 12:00  Patient On (Oxygen Delivery Method): room air      PHYSICAL EXAM:    GENERAL: Comfortable, no acute distress   HEAD:  Normocephalic, atraumatic  EYES: EOMI, PERRLA  HEENT: Moist mucous membranes  NECK: Supple, No JVD  NERVOUS SYSTEM:  Alert & Oriented X3, Motor Strength 5/5 B/L upper and lower extremities  CHEST/LUNG: Clear to auscultation bilaterally  HEART: Regular rate and rhythm  ABDOMEN: Soft, appropriate post op tenderness, Nondistended, Bowel sounds present  GENITOURINARY: Voiding, no palpable bladder  EXTREMITIES:   No clubbing, cyanosis, or edema  MUSCULOSKELETAL- No muscle tenderness, no joint tenderness  SKIN-no rash    LABS:                        12.4   13.69 )-----------( 199      ( 26 Oct 2023 07:49 )             36.8     10-26    140  |  106  |  9   ----------------------------<  104<H>  4.2   |  28  |  0.75    Ca    8.8      26 Oct 2023 07:49        Urinalysis Basic - ( 26 Oct 2023 07:49 )    Color: x / Appearance: x / SG: x / pH: x  Gluc: 104 mg/dL / Ketone: x  / Bili: x / Urobili: x   Blood: x / Protein: x / Nitrite: x   Leuk Esterase: x / RBC: x / WBC x   Sq Epi: x / Non Sq Epi: x / Bacteria: x      MEDICATIONS  (STANDING):  enoxaparin Injectable 40 milliGRAM(s) SubCutaneous every 12 hours  gabapentin 300 milliGRAM(s) Oral three times a day  ketorolac   Injectable 30 milliGRAM(s) IV Push every 6 hours  lactated ringers. 1000 milliLiter(s) (150 mL/Hr) IV Continuous <Continuous>  pantoprazole  Injectable 40 milliGRAM(s) IV Push every 24 hours    MEDICATIONS  (PRN):  acetaminophen   IVPB .. 1000 milliGRAM(s) IV Intermittent every 6 hours PRN Mild Pain (1 - 3)  ondansetron Injectable 4 milliGRAM(s) IV Push every 6 hours PRN Nausea and/or Vomiting  oxyCODONE    Solution 10 milliGRAM(s) Oral every 4 hours PRN Moderate Pain (4 - 6)  oxyCODONE    Solution 5 milliGRAM(s) Oral every 4 hours PRN Mild Pain (1 - 3)    Assessment/Plan  #Gastrojejunal stricture s/p robotic gastrotomy repair with ROZINA cronin clears  -UGIS showed Patent gastrojejunostomy without leak  -ppi  -ambulate  -pain control.     #DVT proph  - lovenox per surg team

## 2023-10-26 NOTE — DISCHARGE NOTE PROVIDER - NSDCMRMEDTOKEN_GEN_ALL_CORE_FT
FLUoxetine 20 mg oral capsule: 1 cap(s) orally once a day  ZTlido 1.8% topical film: Apply topically to affected area once a day

## 2023-10-26 NOTE — DISCHARGE NOTE PROVIDER - CARE PROVIDER_API CALL
Mark Horan  Surgery  [address] [place] [address], [address]  [place] [address]  Phone: [phone]  Fax: [phone]  Follow Up Time:

## 2023-10-26 NOTE — DISCHARGE NOTE NURSING/CASE MANAGEMENT/SOCIAL WORK - PATIENT PORTAL LINK FT
You can access the FollowMyHealth Patient Portal offered by [facility] by registering at the following website: [email or web address]. By joining [email or web address]’s FollowMyHealth portal, you will also be able to view your health information using other applications (apps) compatible with our system.

## 2023-10-26 NOTE — DISCHARGE NOTE NURSING/CASE MANAGEMENT/SOCIAL WORK - CAREGIVER ADDRESS
same as pt
Patient educated verbally regarding LE weight bearing status, d/c plan, DME needs & role of OT. Pt educated regarding fall prevention in hospital and recommendation to use call bell/ask for assistance with all ADL's/transfers etc./oriented to person, place, time and situation

## 2023-10-26 NOTE — DISCHARGE NOTE PROVIDER - NSDCCPCAREPLAN_GEN_ALL_CORE_FT
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

## 2023-10-26 NOTE — DISCHARGE NOTE PROVIDER - HOSPITAL COURSE
Patient is a 58 yo F that underwent laparoscopic robotic assisted revision of gastric bypass , closure of gastrotomy due to GJ anastomosis with ROZINA without any complications. Patient is currently doing very well, pain controlled, and is tolerating phase I bariatric diet. Patient has had uncomplicated hospital course and is stable for discharge home with follow-up in the office in 2 weeks.

## 2023-11-08 ENCOUNTER — APPOINTMENT (OUTPATIENT)
Dept: OBGYN | Facility: CLINIC | Age: 58
End: 2023-11-08
Payer: COMMERCIAL

## 2023-11-08 ENCOUNTER — RESULT CHARGE (OUTPATIENT)
Age: 58
End: 2023-11-08

## 2023-11-08 VITALS
HEIGHT: 62 IN | SYSTOLIC BLOOD PRESSURE: 111 MMHG | BODY MASS INDEX: 25.21 KG/M2 | HEART RATE: 86 BPM | DIASTOLIC BLOOD PRESSURE: 64 MMHG | WEIGHT: 137 LBS

## 2023-11-08 DIAGNOSIS — Z00.00 ENCOUNTER FOR GENERAL ADULT MEDICAL EXAMINATION W/OUT ABNORMAL FINDINGS: ICD-10-CM

## 2023-11-08 LAB
BILIRUB UR QL STRIP: NORMAL
CLARITY UR: CLEAR
COLLECTION METHOD: NORMAL
DATE COLLECTED: NORMAL
GLUCOSE UR-MCNC: NORMAL
HCG UR QL: 0 EU/DL
HEMOCCULT SP1 STL QL: NEGATIVE
HEMOGLOBIN: 11.3
HGB UR QL STRIP.AUTO: ABNORMAL
KETONES UR-MCNC: NORMAL
LEUKOCYTE ESTERASE UR QL STRIP: NORMAL
NITRITE UR QL STRIP: POSITIVE
PH UR STRIP: 5
PROT UR STRIP-MCNC: NORMAL
QUALITY CONTROL: YES
SP GR UR STRIP: 1

## 2023-11-08 PROCEDURE — 99396 PREV VISIT EST AGE 40-64: CPT

## 2023-11-08 PROCEDURE — 81003 URINALYSIS AUTO W/O SCOPE: CPT | Mod: QW

## 2023-11-08 PROCEDURE — 82270 OCCULT BLOOD FECES: CPT

## 2023-11-08 RX ORDER — NITROFURANTOIN (MONOHYDRATE/MACROCRYSTALS) 25; 75 MG/1; MG/1
100 CAPSULE ORAL
Qty: 14 | Refills: 0 | Status: DISCONTINUED | COMMUNITY
Start: 2022-08-05 | End: 2023-11-08

## 2023-11-08 RX ORDER — NITROFURANTOIN MACROCRYSTALS 50 MG/1
50 CAPSULE ORAL
Qty: 50 | Refills: 1 | Status: ACTIVE | COMMUNITY
Start: 2022-11-02 | End: 1900-01-01

## 2023-11-08 RX ORDER — ESTRADIOL 0.1 MG/G
0.1 CREAM VAGINAL
Qty: 1 | Refills: 3 | Status: ACTIVE | COMMUNITY
Start: 2022-11-02 | End: 1900-01-01

## 2023-11-13 LAB — CYTOLOGY CVX/VAG DOC THIN PREP: NORMAL

## 2023-11-19 NOTE — CHART NOTE - NSCHARTNOTEFT_GEN_A_CORE
Operative Note    Patient: Karol Miranda  : Dec 8, 1965  Surgery date: 2023  Pre-Operative Diagnosis: Dysphagia due to stricture at the anatomosis    Post-operative Diagnosis: Dysphagia due to adhesions from the bryon limb to the esophagus.  Enterotomy    Primary Procedure  [48295] Lap, Enterolysis     Secondary Procedure(s)  [17061] Uppr Gi Endoscopy, Diagnosis   [72875] Transversus Abdominis Plan (TAP) Block Bilateral   [05774] Laparoscope Proc, Stom     Surgeon: Mark Horan M.D., FACS   Assistant surgeon: Pierre Ashby MD     Anesthesia type: General Anesthesia     ICD9 Code   Diagnosis   [K22.7]   BARRETTS ESOPHAGUS (Stable)   [K95.89]   OTH COMP OTH BARIATRIC PROC (Stable)   [Z68.26]   BODY MASS INDEX 26.0-26.9 ADULT (Stable)   [Z98.84]   BARIATRIC SURGERY STATUS (Stable)     Estimated blood loss: 30 ml       DRAINS: none    COMPLICATIONS: none    INDICATIONS FOR THE PROCEDURE:  The patient is a 57-year-old female who had a Laparoscopic Gastric Bypass in 2022 and developed svere dysphagia. The patient had an endoscopy which showed a stricture at the GJ anastomosis. The stricture was stented and she strictured down another two times and was stented both times. After three stents the patient was deemed that she would recur again and that her anastomosis should be revised. The patient meets NIH criteria fro bariatric surgery and underwent appropriate preoperative workup, education, and signed the consent form freely. The patient had risks and benefits explained including, but not limited to, bleeding, leak, infection, disability, injury to transient structures, aspiration, DVT, pulmonary embolism, and death. The patient understood and agreed to proceed with surgery.     DESCRIPTION OF PROCEDURE: The patient was identified properly via a time-out. The patient was brought into the operating room and was placed on the operating room table in supine position. The patient was then given general endotracheal anesthesia and was given preoperative antibiotics. Preoperative heparin was given in the ambulatory surgery unit. The patient was then prepped and draped in the usual sterile fashion. An Exparel mixture was mixed at the back table with 20 mL of Exparel, 30 mL of 0.25% Marcaine and 150 mL of normal saline. A Veress needle was placed in the left upper quadrant. The abdomen was insufflated to 15 millimeters of mercury. Once the abdomen was insufflated, the Exparel mixture was given subcutaneously at the sites of incision. An incision was made within the umbilicus and a 8-millimeter robotic trocar was placed in the abdomen. The laparoscope was inserted and there was no injury on initial trocar placement or initial Veress needle placement. A Transversus Abdominus Plane Block was then conducted by placing 20 mL of the Exparel mixture preperitoneal at the distribution of the spinal nerves on the lateral abdominal wall. This was started at the costal margin at the mid axillary line. 20cc of the Exparel mixture was placed in this area. Another 20 mL was placed four centimeters caudal to this area. Another 20 mL was placed four centimeters caudal to this area and another 15 mL was placed four centimeters caudal to this area. This was repeated on the opposite side of the body in a similar fashion. The rest of the Exparel was placed into the subcutaneous tissues and preperitoneal at every trocar site. A 12-millimeter robotic trocar was placed in the right upper quadrant. A 12mm and an 8 millimeter robotic trochars were placed in the left upper quadrant. All of the trochars were placed at the same level on the abdomen. An incision was made in subxiphoid area and a liver retractor was placed to hold the liver anteriorly and superiorly and was held in place using Mediflex device. The patient was then placed into steep reverse Trendelenburg position. At this point to the robot was docked to the robotic trochars. A codier and a tip up grasper were used to retract the stomach. The stomach was dissected off of the liver. At this time, dissection was done at the angle of His to create a window into the lesser sac using the vessel sealer. With the stomach off of the left crura, a moderate sized hiatal hernia was identified. The stomach is gently retracted into the abdomen to assess its degree of tethering in the thorax. The peritoneum overlying the right jose a is incised, and the plane along the hernia sac is developed. The dissection is extended anteriorly and laterally to the left jose a. The base of the crural confluence is dissected free of adhesions. The hernia sac is carefully dissected into the mediastinum with caudal traction. An upper endoscopy was conducted to see the anastomosis. It appeared that the bryon limb was adhesed to the esophagus which appeared to cause the stricture. The entire bryon limb was then dissected free from the esophagus and all the adhesions from the esophagus were taken down. While dissecting the bryon limb off the esophagus an enterotomy was created in the bryon limb. We then completely dissected the bryon limb off and the n repaired the enterotomy with a 3-0 vlock absorbable suture in a continuous fashion in 2 layers. We then conducted another endoscopy to perform a leak test. The pouch was placed under water aand there was no leak from the repair and the stricture has completely resolved itself. The abdominal cavity was irrigated and suctioned. Satisfied with the surgery at this point, the liver retractor was removed under direct vision. The remaining trocars were then removed. The skin was then closed with running Monocryl sutures and dermabond was applied over that. The patient tolerated the procedure well. The patient was extubated in the operating room.    Disposition  To recovery room, VS stable.

## 2023-12-06 NOTE — PATIENT PROFILE ADULT - NSPRESCRALCSCORE_GEN_A_NUR_CAL
Continue Regimen: Mupirocin 2% ointment with aquaphor Render In Strict Bullet Format?: No Detail Level: Zone 1

## 2023-12-19 ENCOUNTER — EMERGENCY (EMERGENCY)
Facility: HOSPITAL | Age: 58
LOS: 0 days | Discharge: HOME | End: 2023-12-19
Attending: STUDENT IN AN ORGANIZED HEALTH CARE EDUCATION/TRAINING PROGRAM
Payer: COMMERCIAL

## 2023-12-19 VITALS
SYSTOLIC BLOOD PRESSURE: 150 MMHG | DIASTOLIC BLOOD PRESSURE: 75 MMHG | HEART RATE: 69 BPM | TEMPERATURE: 98 F | OXYGEN SATURATION: 97 % | RESPIRATION RATE: 18 BRPM

## 2023-12-19 VITALS — WEIGHT: 134.92 LBS | HEIGHT: 64 IN

## 2023-12-19 DIAGNOSIS — Z90.49 ACQUIRED ABSENCE OF OTHER SPECIFIED PARTS OF DIGESTIVE TRACT: Chronic | ICD-10-CM

## 2023-12-19 DIAGNOSIS — Z88.0 ALLERGY STATUS TO PENICILLIN: ICD-10-CM

## 2023-12-19 DIAGNOSIS — Z98.890 OTHER SPECIFIED POSTPROCEDURAL STATES: Chronic | ICD-10-CM

## 2023-12-19 DIAGNOSIS — K22.70 BARRETT'S ESOPHAGUS WITHOUT DYSPLASIA: ICD-10-CM

## 2023-12-19 DIAGNOSIS — N80.9 ENDOMETRIOSIS, UNSPECIFIED: ICD-10-CM

## 2023-12-19 DIAGNOSIS — K21.9 GASTRO-ESOPHAGEAL REFLUX DISEASE WITHOUT ESOPHAGITIS: ICD-10-CM

## 2023-12-19 DIAGNOSIS — Z41.9 ENCOUNTER FOR PROCEDURE FOR PURPOSES OTHER THAN REMEDYING HEALTH STATE, UNSPECIFIED: Chronic | ICD-10-CM

## 2023-12-19 DIAGNOSIS — N82.3 FISTULA OF VAGINA TO LARGE INTESTINE: Chronic | ICD-10-CM

## 2023-12-19 DIAGNOSIS — Z88.1 ALLERGY STATUS TO OTHER ANTIBIOTIC AGENTS STATUS: ICD-10-CM

## 2023-12-19 DIAGNOSIS — N81.10 CYSTOCELE, UNSPECIFIED: Chronic | ICD-10-CM

## 2023-12-19 DIAGNOSIS — R10.13 EPIGASTRIC PAIN: ICD-10-CM

## 2023-12-19 DIAGNOSIS — R11.0 NAUSEA: ICD-10-CM

## 2023-12-19 DIAGNOSIS — S96.812A STRAIN OF OTHER SPECIFIED MUSCLES AND TENDONS AT ANKLE AND FOOT LEVEL, LEFT FOOT, INITIAL ENCOUNTER: Chronic | ICD-10-CM

## 2023-12-19 DIAGNOSIS — Z90.49 ACQUIRED ABSENCE OF OTHER SPECIFIED PARTS OF DIGESTIVE TRACT: ICD-10-CM

## 2023-12-19 DIAGNOSIS — Z98.84 BARIATRIC SURGERY STATUS: ICD-10-CM

## 2023-12-19 DIAGNOSIS — F11.20 OPIOID DEPENDENCE, UNCOMPLICATED: ICD-10-CM

## 2023-12-19 DIAGNOSIS — Z98.84 BARIATRIC SURGERY STATUS: Chronic | ICD-10-CM

## 2023-12-19 DIAGNOSIS — Z88.2 ALLERGY STATUS TO SULFONAMIDES: ICD-10-CM

## 2023-12-19 LAB
ALBUMIN SERPL ELPH-MCNC: 3.3 G/DL — SIGNIFICANT CHANGE UP (ref 3.3–5)
ALBUMIN SERPL ELPH-MCNC: 3.3 G/DL — SIGNIFICANT CHANGE UP (ref 3.3–5)
ALP SERPL-CCNC: 113 U/L — SIGNIFICANT CHANGE UP (ref 40–120)
ALP SERPL-CCNC: 113 U/L — SIGNIFICANT CHANGE UP (ref 40–120)
ALT FLD-CCNC: 14 U/L — SIGNIFICANT CHANGE UP (ref 12–78)
ALT FLD-CCNC: 14 U/L — SIGNIFICANT CHANGE UP (ref 12–78)
ANION GAP SERPL CALC-SCNC: 2 MMOL/L — LOW (ref 5–17)
ANION GAP SERPL CALC-SCNC: 2 MMOL/L — LOW (ref 5–17)
APPEARANCE UR: CLEAR — SIGNIFICANT CHANGE UP
APPEARANCE UR: CLEAR — SIGNIFICANT CHANGE UP
APTT BLD: 36.4 SEC — HIGH (ref 24.5–35.6)
APTT BLD: 36.4 SEC — HIGH (ref 24.5–35.6)
AST SERPL-CCNC: 18 U/L — SIGNIFICANT CHANGE UP (ref 15–37)
AST SERPL-CCNC: 18 U/L — SIGNIFICANT CHANGE UP (ref 15–37)
BACTERIA # UR AUTO: ABNORMAL /HPF
BACTERIA # UR AUTO: ABNORMAL /HPF
BASOPHILS # BLD AUTO: 0.04 K/UL — SIGNIFICANT CHANGE UP (ref 0–0.2)
BASOPHILS # BLD AUTO: 0.04 K/UL — SIGNIFICANT CHANGE UP (ref 0–0.2)
BASOPHILS NFR BLD AUTO: 0.5 % — SIGNIFICANT CHANGE UP (ref 0–2)
BASOPHILS NFR BLD AUTO: 0.5 % — SIGNIFICANT CHANGE UP (ref 0–2)
BILIRUB SERPL-MCNC: 0.5 MG/DL — SIGNIFICANT CHANGE UP (ref 0.2–1.2)
BILIRUB SERPL-MCNC: 0.5 MG/DL — SIGNIFICANT CHANGE UP (ref 0.2–1.2)
BILIRUB UR-MCNC: NEGATIVE — SIGNIFICANT CHANGE UP
BILIRUB UR-MCNC: NEGATIVE — SIGNIFICANT CHANGE UP
BLD GP AB SCN SERPL QL: SIGNIFICANT CHANGE UP
BLD GP AB SCN SERPL QL: SIGNIFICANT CHANGE UP
BUN SERPL-MCNC: 5 MG/DL — LOW (ref 7–23)
BUN SERPL-MCNC: 5 MG/DL — LOW (ref 7–23)
CALCIUM SERPL-MCNC: 8.6 MG/DL — SIGNIFICANT CHANGE UP (ref 8.5–10.1)
CALCIUM SERPL-MCNC: 8.6 MG/DL — SIGNIFICANT CHANGE UP (ref 8.5–10.1)
CAST: 0 /LPF — SIGNIFICANT CHANGE UP (ref 0–4)
CAST: 0 /LPF — SIGNIFICANT CHANGE UP (ref 0–4)
CHLORIDE SERPL-SCNC: 106 MMOL/L — SIGNIFICANT CHANGE UP (ref 96–108)
CHLORIDE SERPL-SCNC: 106 MMOL/L — SIGNIFICANT CHANGE UP (ref 96–108)
CO2 SERPL-SCNC: 31 MMOL/L — SIGNIFICANT CHANGE UP (ref 22–31)
CO2 SERPL-SCNC: 31 MMOL/L — SIGNIFICANT CHANGE UP (ref 22–31)
COLOR SPEC: YELLOW — SIGNIFICANT CHANGE UP
COLOR SPEC: YELLOW — SIGNIFICANT CHANGE UP
CREAT SERPL-MCNC: 0.75 MG/DL — SIGNIFICANT CHANGE UP (ref 0.5–1.3)
CREAT SERPL-MCNC: 0.75 MG/DL — SIGNIFICANT CHANGE UP (ref 0.5–1.3)
DIFF PNL FLD: NEGATIVE — SIGNIFICANT CHANGE UP
DIFF PNL FLD: NEGATIVE — SIGNIFICANT CHANGE UP
EGFR: 93 ML/MIN/1.73M2 — SIGNIFICANT CHANGE UP
EGFR: 93 ML/MIN/1.73M2 — SIGNIFICANT CHANGE UP
EOSINOPHIL # BLD AUTO: 0.1 K/UL — SIGNIFICANT CHANGE UP (ref 0–0.5)
EOSINOPHIL # BLD AUTO: 0.1 K/UL — SIGNIFICANT CHANGE UP (ref 0–0.5)
EOSINOPHIL NFR BLD AUTO: 1.3 % — SIGNIFICANT CHANGE UP (ref 0–6)
EOSINOPHIL NFR BLD AUTO: 1.3 % — SIGNIFICANT CHANGE UP (ref 0–6)
GLUCOSE SERPL-MCNC: 102 MG/DL — HIGH (ref 70–99)
GLUCOSE SERPL-MCNC: 102 MG/DL — HIGH (ref 70–99)
GLUCOSE UR QL: NEGATIVE MG/DL — SIGNIFICANT CHANGE UP
GLUCOSE UR QL: NEGATIVE MG/DL — SIGNIFICANT CHANGE UP
HCT VFR BLD CALC: 38.7 % — SIGNIFICANT CHANGE UP (ref 34.5–45)
HCT VFR BLD CALC: 38.7 % — SIGNIFICANT CHANGE UP (ref 34.5–45)
HGB BLD-MCNC: 13.1 G/DL — SIGNIFICANT CHANGE UP (ref 11.5–15.5)
HGB BLD-MCNC: 13.1 G/DL — SIGNIFICANT CHANGE UP (ref 11.5–15.5)
IMM GRANULOCYTES NFR BLD AUTO: 0.3 % — SIGNIFICANT CHANGE UP (ref 0–0.9)
IMM GRANULOCYTES NFR BLD AUTO: 0.3 % — SIGNIFICANT CHANGE UP (ref 0–0.9)
INR BLD: 1.02 RATIO — SIGNIFICANT CHANGE UP (ref 0.85–1.18)
INR BLD: 1.02 RATIO — SIGNIFICANT CHANGE UP (ref 0.85–1.18)
KETONES UR-MCNC: ABNORMAL MG/DL
KETONES UR-MCNC: ABNORMAL MG/DL
LEUKOCYTE ESTERASE UR-ACNC: ABNORMAL
LEUKOCYTE ESTERASE UR-ACNC: ABNORMAL
LIDOCAIN IGE QN: 14 U/L — SIGNIFICANT CHANGE UP (ref 13–75)
LIDOCAIN IGE QN: 14 U/L — SIGNIFICANT CHANGE UP (ref 13–75)
LYMPHOCYTES # BLD AUTO: 2 K/UL — SIGNIFICANT CHANGE UP (ref 1–3.3)
LYMPHOCYTES # BLD AUTO: 2 K/UL — SIGNIFICANT CHANGE UP (ref 1–3.3)
LYMPHOCYTES # BLD AUTO: 25.3 % — SIGNIFICANT CHANGE UP (ref 13–44)
LYMPHOCYTES # BLD AUTO: 25.3 % — SIGNIFICANT CHANGE UP (ref 13–44)
MCHC RBC-ENTMCNC: 29.7 PG — SIGNIFICANT CHANGE UP (ref 27–34)
MCHC RBC-ENTMCNC: 29.7 PG — SIGNIFICANT CHANGE UP (ref 27–34)
MCHC RBC-ENTMCNC: 33.9 GM/DL — SIGNIFICANT CHANGE UP (ref 32–36)
MCHC RBC-ENTMCNC: 33.9 GM/DL — SIGNIFICANT CHANGE UP (ref 32–36)
MCV RBC AUTO: 87.8 FL — SIGNIFICANT CHANGE UP (ref 80–100)
MCV RBC AUTO: 87.8 FL — SIGNIFICANT CHANGE UP (ref 80–100)
MONOCYTES # BLD AUTO: 0.49 K/UL — SIGNIFICANT CHANGE UP (ref 0–0.9)
MONOCYTES # BLD AUTO: 0.49 K/UL — SIGNIFICANT CHANGE UP (ref 0–0.9)
MONOCYTES NFR BLD AUTO: 6.2 % — SIGNIFICANT CHANGE UP (ref 2–14)
MONOCYTES NFR BLD AUTO: 6.2 % — SIGNIFICANT CHANGE UP (ref 2–14)
NEUTROPHILS # BLD AUTO: 5.25 K/UL — SIGNIFICANT CHANGE UP (ref 1.8–7.4)
NEUTROPHILS # BLD AUTO: 5.25 K/UL — SIGNIFICANT CHANGE UP (ref 1.8–7.4)
NEUTROPHILS NFR BLD AUTO: 66.4 % — SIGNIFICANT CHANGE UP (ref 43–77)
NEUTROPHILS NFR BLD AUTO: 66.4 % — SIGNIFICANT CHANGE UP (ref 43–77)
NITRITE UR-MCNC: NEGATIVE — SIGNIFICANT CHANGE UP
NITRITE UR-MCNC: NEGATIVE — SIGNIFICANT CHANGE UP
PH UR: 5.5 — SIGNIFICANT CHANGE UP (ref 5–8)
PH UR: 5.5 — SIGNIFICANT CHANGE UP (ref 5–8)
PLATELET # BLD AUTO: 251 K/UL — SIGNIFICANT CHANGE UP (ref 150–400)
PLATELET # BLD AUTO: 251 K/UL — SIGNIFICANT CHANGE UP (ref 150–400)
POTASSIUM SERPL-MCNC: 3.3 MMOL/L — LOW (ref 3.5–5.3)
POTASSIUM SERPL-MCNC: 3.3 MMOL/L — LOW (ref 3.5–5.3)
POTASSIUM SERPL-SCNC: 3.3 MMOL/L — LOW (ref 3.5–5.3)
POTASSIUM SERPL-SCNC: 3.3 MMOL/L — LOW (ref 3.5–5.3)
PROT SERPL-MCNC: 7.1 GM/DL — SIGNIFICANT CHANGE UP (ref 6–8.3)
PROT SERPL-MCNC: 7.1 GM/DL — SIGNIFICANT CHANGE UP (ref 6–8.3)
PROT UR-MCNC: NEGATIVE MG/DL — SIGNIFICANT CHANGE UP
PROT UR-MCNC: NEGATIVE MG/DL — SIGNIFICANT CHANGE UP
PROTHROM AB SERPL-ACNC: 11.5 SEC — SIGNIFICANT CHANGE UP (ref 9.5–13)
PROTHROM AB SERPL-ACNC: 11.5 SEC — SIGNIFICANT CHANGE UP (ref 9.5–13)
RBC # BLD: 4.41 M/UL — SIGNIFICANT CHANGE UP (ref 3.8–5.2)
RBC # BLD: 4.41 M/UL — SIGNIFICANT CHANGE UP (ref 3.8–5.2)
RBC # FLD: 13.7 % — SIGNIFICANT CHANGE UP (ref 10.3–14.5)
RBC # FLD: 13.7 % — SIGNIFICANT CHANGE UP (ref 10.3–14.5)
RBC CASTS # UR COMP ASSIST: 0 /HPF — SIGNIFICANT CHANGE UP (ref 0–4)
RBC CASTS # UR COMP ASSIST: 0 /HPF — SIGNIFICANT CHANGE UP (ref 0–4)
SODIUM SERPL-SCNC: 139 MMOL/L — SIGNIFICANT CHANGE UP (ref 135–145)
SODIUM SERPL-SCNC: 139 MMOL/L — SIGNIFICANT CHANGE UP (ref 135–145)
SP GR SPEC: 1.02 — SIGNIFICANT CHANGE UP (ref 1–1.03)
SP GR SPEC: 1.02 — SIGNIFICANT CHANGE UP (ref 1–1.03)
SQUAMOUS # UR AUTO: 5 /HPF — SIGNIFICANT CHANGE UP (ref 0–5)
SQUAMOUS # UR AUTO: 5 /HPF — SIGNIFICANT CHANGE UP (ref 0–5)
TROPONIN I, HIGH SENSITIVITY RESULT: 5.71 NG/L — SIGNIFICANT CHANGE UP
TROPONIN I, HIGH SENSITIVITY RESULT: 5.71 NG/L — SIGNIFICANT CHANGE UP
UROBILINOGEN FLD QL: 0.2 MG/DL — SIGNIFICANT CHANGE UP (ref 0.2–1)
UROBILINOGEN FLD QL: 0.2 MG/DL — SIGNIFICANT CHANGE UP (ref 0.2–1)
WBC # BLD: 7.9 K/UL — SIGNIFICANT CHANGE UP (ref 3.8–10.5)
WBC # BLD: 7.9 K/UL — SIGNIFICANT CHANGE UP (ref 3.8–10.5)
WBC # FLD AUTO: 7.9 K/UL — SIGNIFICANT CHANGE UP (ref 3.8–10.5)
WBC # FLD AUTO: 7.9 K/UL — SIGNIFICANT CHANGE UP (ref 3.8–10.5)
WBC UR QL: 8 /HPF — HIGH (ref 0–5)
WBC UR QL: 8 /HPF — HIGH (ref 0–5)

## 2023-12-19 PROCEDURE — 86850 RBC ANTIBODY SCREEN: CPT

## 2023-12-19 PROCEDURE — 85025 COMPLETE CBC W/AUTO DIFF WBC: CPT

## 2023-12-19 PROCEDURE — 86901 BLOOD TYPING SEROLOGIC RH(D): CPT

## 2023-12-19 PROCEDURE — 36415 COLL VENOUS BLD VENIPUNCTURE: CPT

## 2023-12-19 PROCEDURE — 74177 CT ABD & PELVIS W/CONTRAST: CPT | Mod: ME

## 2023-12-19 PROCEDURE — 86900 BLOOD TYPING SEROLOGIC ABO: CPT

## 2023-12-19 PROCEDURE — 96374 THER/PROPH/DIAG INJ IV PUSH: CPT | Mod: XU

## 2023-12-19 PROCEDURE — 81001 URINALYSIS AUTO W/SCOPE: CPT

## 2023-12-19 PROCEDURE — 99284 EMERGENCY DEPT VISIT MOD MDM: CPT | Mod: 25

## 2023-12-19 PROCEDURE — 87086 URINE CULTURE/COLONY COUNT: CPT

## 2023-12-19 PROCEDURE — 96375 TX/PRO/DX INJ NEW DRUG ADDON: CPT

## 2023-12-19 PROCEDURE — G1004: CPT

## 2023-12-19 PROCEDURE — 85610 PROTHROMBIN TIME: CPT

## 2023-12-19 PROCEDURE — 80053 COMPREHEN METABOLIC PANEL: CPT

## 2023-12-19 PROCEDURE — 96376 TX/PRO/DX INJ SAME DRUG ADON: CPT

## 2023-12-19 PROCEDURE — 99285 EMERGENCY DEPT VISIT HI MDM: CPT

## 2023-12-19 PROCEDURE — 74177 CT ABD & PELVIS W/CONTRAST: CPT | Mod: 26,ME

## 2023-12-19 PROCEDURE — 85730 THROMBOPLASTIN TIME PARTIAL: CPT

## 2023-12-19 PROCEDURE — 84484 ASSAY OF TROPONIN QUANT: CPT

## 2023-12-19 PROCEDURE — 83690 ASSAY OF LIPASE: CPT

## 2023-12-19 RX ORDER — MORPHINE SULFATE 50 MG/1
4 CAPSULE, EXTENDED RELEASE ORAL ONCE
Refills: 0 | Status: DISCONTINUED | OUTPATIENT
Start: 2023-12-19 | End: 2023-12-19

## 2023-12-19 RX ORDER — SODIUM CHLORIDE 9 MG/ML
1000 INJECTION INTRAMUSCULAR; INTRAVENOUS; SUBCUTANEOUS ONCE
Refills: 0 | Status: COMPLETED | OUTPATIENT
Start: 2023-12-19 | End: 2023-12-19

## 2023-12-19 RX ORDER — POTASSIUM CHLORIDE 20 MEQ
20 PACKET (EA) ORAL ONCE
Refills: 0 | Status: COMPLETED | OUTPATIENT
Start: 2023-12-19 | End: 2023-12-19

## 2023-12-19 RX ORDER — FAMOTIDINE 10 MG/ML
20 INJECTION INTRAVENOUS ONCE
Refills: 0 | Status: COMPLETED | OUTPATIENT
Start: 2023-12-19 | End: 2023-12-19

## 2023-12-19 RX ADMIN — MORPHINE SULFATE 4 MILLIGRAM(S): 50 CAPSULE, EXTENDED RELEASE ORAL at 13:05

## 2023-12-19 RX ADMIN — SODIUM CHLORIDE 1000 MILLILITER(S): 9 INJECTION INTRAMUSCULAR; INTRAVENOUS; SUBCUTANEOUS at 11:32

## 2023-12-19 RX ADMIN — MORPHINE SULFATE 4 MILLIGRAM(S): 50 CAPSULE, EXTENDED RELEASE ORAL at 11:32

## 2023-12-19 RX ADMIN — FAMOTIDINE 20 MILLIGRAM(S): 10 INJECTION INTRAVENOUS at 13:44

## 2023-12-19 RX ADMIN — Medication 20 MILLIEQUIVALENT(S): at 12:09

## 2023-12-19 NOTE — ED ADULT NURSE NOTE - CAS TRG GENERAL NORM CIRC DET
Left patient message informing him that I have sent TriMix #9 prescription to the [facility] pharmacy.  Once he receives his prescription in the mail to call our scheduling number to make injection teaching appointment with myself.  Dr. Lawson would like patient to follow up with him annually or sooner if having issues.  Left scheduling number.    Melonie Burch RN, BSN  [facility]  [phone]    
[facility] Call Center    Phone Message    May a detailed message be left on voicemail: yes     Reason for Call: Other: .  Pt calling stating he needs a prescription for Trimix and is requesting Lawson or care team member to also explain to him how to administer it. Please call pt     Action Taken: Message routed to:  Other: Uro    Travel Screening: Not Applicable                                                                      
Strong peripheral pulses

## 2023-12-19 NOTE — ED ADULT NURSE NOTE - NSFALLUNIVINTERV_ED_ALL_ED
Bed/Stretcher in lowest position, wheels locked, appropriate side rails in place/Call bell, personal items and telephone in reach/Instruct patient to call for assistance before getting out of bed/chair/stretcher/Non-slip footwear applied when patient is off stretcher/[place] to call system/Physically safe environment - no spills, clutter or unnecessary equipment/Purposeful proactive rounding/Room/bathroom lighting operational, light cord in reach Bed/Stretcher in lowest position, wheels locked, appropriate side rails in place/Call bell, personal items and telephone in reach/Instruct patient to call for assistance before getting out of bed/chair/stretcher/Non-slip footwear applied when patient is off stretcher/[place] to call system/Physically safe environment - no spills, clutter or unnecessary equipment/Purposeful proactive rounding/Room/bathroom lighting operational, light cord in reach

## 2023-12-19 NOTE — ED STATDOCS - OBJECTIVE STATEMENT
59 y/o F with PMHx of opiod dependence, endometriosis, pelvic floor dysfunction, vitamin D deficiency, hormone replacement therapy, thyroid cyst, pudendal neuralgia, hepatic adenoma presents to the ED c/o epigastric pain, states she is a pt of  and was instructed to come in for further evaluation at this time, denies fevers, nausea, diarrhea, states she took Percocet prior to coming in. Last bowel movement yesterday. Pt says she has chills yesterday as well. Allergies to penicillin and cephalosporins.

## 2023-12-19 NOTE — ED STATDOCS - CLINICAL SUMMARY MEDICAL DECISION MAKING FREE TEXT BOX
58-year-old female with complicated gastric bypass surgery presents to the emergency department for epigastric pain and nausea.  Seen by primary surgeon, concern for complications secondary to her surgeries.  Will evaluate with preop blood work, CT with p.o. and IV contrast, urinalysis, pain control, discussion with primary surgeon, reassess for dispo.

## 2023-12-19 NOTE — ED STATDOCS - PROGRESS NOTE DETAILS
58 year old female s/p gastric bypass 3/2022 by Dr. Horan with APC procedure done 10/2023 sent to ED by Dr. Horan for CT scan for epigastric abdominal pain. Endorses some chills last night, denies fevers, nausea, vomiting, constipation, dysuria. Last BM yesterday, no hematochezia or melena. VSS on arrival, physical exam demonstrating epigastric tenderness to palpation, no guarding or rebound, no distension, rest of exam unremarkable. Will follow labs, CT A/P with po and IV contrast, urine, reeval, reach out to Dr. Horan. - Shayla Sanchez PA-C Labs and imaging reviewed. K 3.3, rest of labs within normal limits. CT demonstrated esophageal thickening, no bowel obstruction or hernia appreciated. Discussed findings with Aung, states patient can be discharged and f/u with GI outpatient. Discussed with patient. Sxs likely secondary to GERD. Pt notes she has a h/o Ahn's esophagus and is on 40mg Prilosec daily. Will give dose of pepcid/maalox and dc home with f/u with Alok. ADvised to continue pepcid PRN. Pt understands and agrees to plan. Strict return precautions were given. All questions and concerns were addressed. - Shayla Sanchez PA-C

## 2023-12-19 NOTE — ED STATDOCS - ATTENDING APP SHARED VISIT CONTRIBUTION OF CARE
I, Naun Chamberlain DO, personally saw the patient with ACP.  I have personally performed a face to face diagnostic evaluation on this patient.  I have reviewed the ACP note and agree with the history, exam, and plan of care, except as noted.  The initial assessment was performed by myself and then the patient was handed off to the ACP. The patient was followed and re-evaluated by the ACP. All labs, imaging and procedures were evaluated and performed by the ACP and I was available for consultation if any questions in the patients care came up.

## 2023-12-19 NOTE — ED ADULT NURSE NOTE - OBJECTIVE STATEMENT
57 y/o female presenting to ER with c/o abdominal pain associated with nausea but no vomiting, diarrhea, fevers. Pt of Dr. Horan who instructed pt to present to ED for further evaluation. 59 y/o female presenting to ER with c/o abdominal pain associated with nausea but no vomiting, diarrhea, fevers. Pt of Dr. Horan who instructed pt to present to ED for further evaluation.

## 2023-12-19 NOTE — ED STATDOCS - PHYSICAL EXAMINATION
Constitutional: NAD AAOx3  Eyes: PERRLA EOMI  Head: Normocephalic atraumatic  Mouth: MMM  Cardiac: regular rate   Resp: unlabored breathing  GI: Abd s/nd, epigastric ttp  Neuro: grossly normal and intact  Skin: No visible rashes

## 2023-12-19 NOTE — ED STATDOCS - PATIENT PORTAL LINK FT
You can access the FollowMyHealth Patient Portal offered by [facility] by registering at the following website: [email or web address]. By joining [employer]’s FollowMyHealth portal, you will also be able to view your health information using other applications (apps) compatible with our system. You can access the FollowMyHealth Patient Portal offered by [facility] by registering at the following website: [email or web address]. By joining "[employer]’s FollowMyHealth portal, you will also be able to view your health information using other applications (apps) compatible with our system.

## 2023-12-19 NOTE — ED ADULT TRIAGE NOTE - CHIEF COMPLAINT QUOTE
PT presents to er with complaints of epigastric pain, states she is a pt of  and was instructed to come in for further evaluation at this time, denies fevers, nausea, diarrhea, states she took Percocet prior to coming in.

## 2023-12-20 LAB
CULTURE RESULTS: SIGNIFICANT CHANGE UP
CULTURE RESULTS: SIGNIFICANT CHANGE UP
SPECIMEN SOURCE: SIGNIFICANT CHANGE UP
SPECIMEN SOURCE: SIGNIFICANT CHANGE UP

## 2024-01-17 ENCOUNTER — NON-APPOINTMENT (OUTPATIENT)
Age: 59
End: 2024-01-17

## 2024-02-28 NOTE — H&P PST ADULT - PROBLEM/PLAN-1
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 Pt is a vasculopath, sp open Abd aneurysm repair with peripheral disease.   - schedule ALBER to assess heart valves once cleared by GI for ALBER. - given high surgical risk, transcatheter intervention is recommended.    I, Dr. Chelo Menard, personally performed the evaluation and management (E/M) services for this new patient. That E/M includes conducting the clinically appropriate initial history &/or exam, assessing all conditions, and establishing the plan of care. Today, my TAYLOR, was here to observe my evaluation and management service for this patient & follow plan of care established by me going forward.  DISPLAY PLAN FREE TEXT

## 2024-05-02 ENCOUNTER — OUTPATIENT (OUTPATIENT)
Dept: OUTPATIENT SERVICES | Facility: HOSPITAL | Age: 59
LOS: 1 days | Discharge: HOME | End: 2024-05-02
Payer: COMMERCIAL

## 2024-05-02 VITALS
SYSTOLIC BLOOD PRESSURE: 141 MMHG | OXYGEN SATURATION: 100 % | HEART RATE: 78 BPM | HEIGHT: 62 IN | TEMPERATURE: 98 F | DIASTOLIC BLOOD PRESSURE: 80 MMHG | RESPIRATION RATE: 16 BRPM | WEIGHT: 139.99 LBS

## 2024-05-02 DIAGNOSIS — N82.3 FISTULA OF VAGINA TO LARGE INTESTINE: Chronic | ICD-10-CM

## 2024-05-02 DIAGNOSIS — Z98.890 OTHER SPECIFIED POSTPROCEDURAL STATES: Chronic | ICD-10-CM

## 2024-05-02 DIAGNOSIS — Z41.9 ENCOUNTER FOR PROCEDURE FOR PURPOSES OTHER THAN REMEDYING HEALTH STATE, UNSPECIFIED: Chronic | ICD-10-CM

## 2024-05-02 DIAGNOSIS — K21.9 GASTRO-ESOPHAGEAL REFLUX DISEASE WITHOUT ESOPHAGITIS: ICD-10-CM

## 2024-05-02 DIAGNOSIS — S96.812A STRAIN OF OTHER SPECIFIED MUSCLES AND TENDONS AT ANKLE AND FOOT LEVEL, LEFT FOOT, INITIAL ENCOUNTER: Chronic | ICD-10-CM

## 2024-05-02 DIAGNOSIS — Z98.84 BARIATRIC SURGERY STATUS: Chronic | ICD-10-CM

## 2024-05-02 DIAGNOSIS — N81.10 CYSTOCELE, UNSPECIFIED: Chronic | ICD-10-CM

## 2024-05-02 DIAGNOSIS — R11.10 VOMITING, UNSPECIFIED: ICD-10-CM

## 2024-05-02 DIAGNOSIS — Z90.49 ACQUIRED ABSENCE OF OTHER SPECIFIED PARTS OF DIGESTIVE TRACT: Chronic | ICD-10-CM

## 2024-05-02 PROCEDURE — 88312 SPECIAL STAINS GROUP 1: CPT

## 2024-05-02 PROCEDURE — 88305 TISSUE EXAM BY PATHOLOGIST: CPT | Mod: 26

## 2024-05-02 PROCEDURE — 88305 TISSUE EXAM BY PATHOLOGIST: CPT

## 2024-05-02 PROCEDURE — 88312 SPECIAL STAINS GROUP 1: CPT | Mod: 26

## 2024-05-02 RX ORDER — PANTOPRAZOLE SODIUM 20 MG/1
1 TABLET, DELAYED RELEASE ORAL
Refills: 0 | DISCHARGE

## 2024-05-02 RX ORDER — LIDOCAINE 4 G/100G
1 CREAM TOPICAL
Refills: 0 | DISCHARGE

## 2024-05-02 RX ORDER — FAMOTIDINE 10 MG/ML
1 INJECTION INTRAVENOUS
Refills: 0 | DISCHARGE

## 2024-05-02 RX ORDER — FLUOXETINE HCL 10 MG
1 CAPSULE ORAL
Qty: 0 | Refills: 0 | DISCHARGE

## 2024-05-02 RX ORDER — OXYCODONE HYDROCHLORIDE 5 MG/1
1 TABLET ORAL
Refills: 0 | DISCHARGE

## 2024-05-02 NOTE — ASU PATIENT PROFILE, ADULT - FALL HARM RISK - UNIVERSAL INTERVENTIONS
Bed in lowest position, wheels locked, appropriate side rails in place/Call bell, personal items and telephone in reach/Instruct patient to call for assistance before getting out of bed or chair/Non-slip footwear when patient is out of bed/[place] to call system/Physically safe environment - no spills, clutter or unnecessary equipment/Purposeful Proactive Rounding/Room/bathroom lighting operational, light cord in reach

## 2024-05-06 LAB — SURGICAL PATHOLOGY STUDY: SIGNIFICANT CHANGE UP

## 2024-05-07 DIAGNOSIS — I10 ESSENTIAL (PRIMARY) HYPERTENSION: ICD-10-CM

## 2024-05-07 DIAGNOSIS — Z88.2 ALLERGY STATUS TO SULFONAMIDES: ICD-10-CM

## 2024-05-07 DIAGNOSIS — R10.13 EPIGASTRIC PAIN: ICD-10-CM

## 2024-05-07 DIAGNOSIS — K21.9 GASTRO-ESOPHAGEAL REFLUX DISEASE WITHOUT ESOPHAGITIS: ICD-10-CM

## 2024-05-07 DIAGNOSIS — Z98.84 BARIATRIC SURGERY STATUS: ICD-10-CM

## 2024-05-07 DIAGNOSIS — K29.50 UNSPECIFIED CHRONIC GASTRITIS WITHOUT BLEEDING: ICD-10-CM

## 2024-05-07 DIAGNOSIS — Z87.891 PERSONAL HISTORY OF NICOTINE DEPENDENCE: ICD-10-CM

## 2024-05-17 NOTE — H&P PST ADULT - NSICDXFAMILYHX_GEN_ALL_CORE_FT
Awake/Alert/Cooperative
FAMILY HISTORY:  Father  Still living? No  Family history of heart attack, Age at diagnosis: Age Unknown  Family history of hypertension, Age at diagnosis: Age Unknown    Mother  Still living? No  Family history of hypertension, Age at diagnosis: Age Unknown  Family history of uterine cancer, Age at diagnosis: Age Unknown    Sibling  Still living? Unknown  FHx: bladder cancer, Age at diagnosis: Age Unknown

## 2024-06-17 NOTE — ASU PATIENT PROFILE, ADULT - NS PRO AD PATIENT TYPE
T/C to patient to discuss medication (Wegovy) and to ask if he is tolerating it well. No answer and LVM. MyChart message sent.    Health Care Proxy (HCP)

## 2024-07-14 NOTE — ASU PREOP CHECKLIST - NSWEIGHTCALCTOOLDRUG_GEN_A_CORE
Problem: Pain  Goal: Acceptable pain level achieved/maintained at rest using appropriate pain scale for the patient  Outcome: Monitoring/Evaluating progress  Goal: Acceptable pain level achieved/maintained with activity using appropriate pain scale for the patient  Outcome: Monitoring/Evaluating progress  Goal: Acceptable pain level achieved/maintained without oversedation  Outcome: Monitoring/Evaluating progress     Problem: At Risk for Falls  Goal: Patient does not fall  Outcome: Monitoring/Evaluating progress  Goal: Patient takes action to control fall-related risks  Outcome: Monitoring/Evaluating progress     Problem: Delirium, Risk for  Goal: # No symptoms of delirium  Description: Evaluate delirium symptoms under active problem when present  Outcome: Monitoring/Evaluating progress  Goal: # Verbalizes understanding of delirium preventive strategies  Description: Document on Patient Education Activity   Outcome: Monitoring/Evaluating progress     Problem: Impaired Physical Mobility  Goal: Functional status is maintained or returned to baseline during hospitalization  Outcome: Monitoring/Evaluating progress  Goal: Tolerates activity for discharge setting with no abnormal symptoms  Outcome: Monitoring/Evaluating progress     
 used

## 2024-08-22 NOTE — ASU PATIENT PROFILE, ADULT - HARM RISK FACTORS
What Type Of Note Output Would You Prefer (Optional)?: Standard Output
What Is The Reason For Today's Visit?: Full Body Skin Examination with No Concerns
What Is The Reason For Today's Visit? (Being Monitored For X): concerning skin lesions on an annual basis
How Severe Are Your Spot(S)?: mild
Additional History: Patient this here for her full skin check, so has no immediate lesions of concern. Her dad has had multiple non melanoma skin cancers removed. \\nShe was last seen in 2022.
no

## 2024-10-09 ENCOUNTER — NON-APPOINTMENT (OUTPATIENT)
Age: 59
End: 2024-10-09

## 2024-10-22 NOTE — H&P PST ADULT - NS PRO AD PATIENT TYPE ON CHART
Patient:  Radha Vaughan 59 y.o. female     10/22/24      Chiefcomplaint:   Chief Complaint   Patient presents with    Cough     X 2 weeks     History of Present Illness   Non productive cough for 2 weeks  Started prior to cruise  No sinus congestion    CT abd - incidental nodule  Impression    1. No acute findings.  2. Very small, nodular density right middle lobe, indeterminate.     CXR  IMPRESSION:  No acute process.     Mild right pleural effusion.    Health Maintenance Due   Topic Date Due    HIV screen  Never done    Hepatitis C screen  Never done    Hepatitis B vaccine (1 of 3 - 19+ 3-dose series) Never done    Cervical cancer screen  Never done    Breast cancer screen  Never done    Colorectal Cancer Screen  Never done    Shingles vaccine (1 of 2) Never done    COVID-19 Vaccine (3 - 2023-24 season) 09/01/2024      History:  Prior to Visit Medications    Medication Sig Taking? Authorizing Provider   valACYclovir (VALTREX) 1 g tablet Take 1 tablet by mouth daily Yes Juve Gleason DO   diclofenac-miSOPROStol (ARTHROTEC 50) 50-0.2 MG per tablet Take 1 tablet by mouth 2 times daily for 14 days  Juve Gleason DO   estradiol (ESTRACE) 0.1 MG/GM vaginal cream insert 1 gram vaginally TWICE A WEEK  ProviderNabil MD   acetaminophen (TYLENOL) 500 MG tablet Take 1 tablet by mouth in the morning and at bedtime 2 tabs  Provider, MD Nabil      No past medical history on file.  Physical Exam   Vitals: /79   Pulse 79   Temp 97.9 °F (36.6 °C)   Resp 18   Ht 1.6 m (5' 3\")   Wt 72.5 kg (159 lb 12.8 oz)   SpO2 99%   BMI 28.31 kg/m²   General Appearance: Alert, oriented, no acute distress  HEENT: No scleral icterus. No visible discharge from eyes. Nasal mucosa is pale.  Neck: Not rigid. No visible masses  Chest wall/Lung: Clear to auscultation bilaterally,  respirations unlabored. No ronchi/wheezing/rales  Heart: RRR, no murmur  Abdomen: Soft, nontender  Extremities:  No edema  Skin: No rashes.  Health Care Proxy (HCP)

## 2024-11-11 ENCOUNTER — APPOINTMENT (OUTPATIENT)
Dept: OBGYN | Facility: CLINIC | Age: 59
End: 2024-11-11
Payer: COMMERCIAL

## 2024-11-11 VITALS
DIASTOLIC BLOOD PRESSURE: 80 MMHG | HEIGHT: 62 IN | HEART RATE: 67 BPM | SYSTOLIC BLOOD PRESSURE: 123 MMHG | BODY MASS INDEX: 27.6 KG/M2 | WEIGHT: 150 LBS

## 2024-11-11 DIAGNOSIS — Z00.00 ENCOUNTER FOR GENERAL ADULT MEDICAL EXAMINATION W/OUT ABNORMAL FINDINGS: ICD-10-CM

## 2024-11-11 DIAGNOSIS — Z01.419 ENCOUNTER FOR GYNECOLOGICAL EXAMINATION (GENERAL) (ROUTINE) W/OUT ABNORMAL FINDINGS: ICD-10-CM

## 2024-11-11 DIAGNOSIS — D64.9 ANEMIA, UNSPECIFIED: ICD-10-CM

## 2024-11-11 DIAGNOSIS — R92.30 DENSE BREASTS, UNSPECIFIED: ICD-10-CM

## 2024-11-11 DIAGNOSIS — N95.2 POSTMENOPAUSAL ATROPHIC VAGINITIS: ICD-10-CM

## 2024-11-11 DIAGNOSIS — Z12.4 ENCOUNTER FOR SCREENING FOR MALIGNANT NEOPLASM OF CERVIX: ICD-10-CM

## 2024-11-11 LAB
BILIRUB UR QL STRIP: NEGATIVE
CLARITY UR: CLEAR
COLLECTION METHOD: NORMAL
DATE COLLECTED: NORMAL
GLUCOSE UR-MCNC: NEGATIVE
HCG UR QL: 0 EU/DL
HEMOCCULT SP1 STL QL: NEGATIVE
HEMOGLOBIN: 10.8
HGB UR QL STRIP.AUTO: NEGATIVE
KETONES UR-MCNC: NEGATIVE
LEUKOCYTE ESTERASE UR QL STRIP: NORMAL
NITRITE UR QL STRIP: NEGATIVE
PH UR STRIP: 5
PROT UR STRIP-MCNC: NEGATIVE
QUALITY CONTROL: YES
SP GR UR STRIP: 1

## 2024-11-11 PROCEDURE — 99396 PREV VISIT EST AGE 40-64: CPT

## 2024-11-11 PROCEDURE — 85018 HEMOGLOBIN: CPT | Mod: QW

## 2024-11-11 PROCEDURE — 81003 URINALYSIS AUTO W/O SCOPE: CPT | Mod: QW

## 2024-11-11 PROCEDURE — 99459 PELVIC EXAMINATION: CPT

## 2024-11-11 PROCEDURE — 82270 OCCULT BLOOD FECES: CPT

## 2024-11-12 LAB
BASOPHILS # BLD AUTO: 0.07 K/UL
BASOPHILS NFR BLD AUTO: 1.1 %
EOSINOPHIL # BLD AUTO: 0.26 K/UL
EOSINOPHIL NFR BLD AUTO: 4.2 %
HCT VFR BLD CALC: 35.4 %
HGB BLD-MCNC: 11.6 G/DL
IMM GRANULOCYTES NFR BLD AUTO: 0.2 %
LYMPHOCYTES # BLD AUTO: 2.76 K/UL
LYMPHOCYTES NFR BLD AUTO: 44.3 %
MAN DIFF?: NORMAL
MCHC RBC-ENTMCNC: 29.3 PG
MCHC RBC-ENTMCNC: 32.8 G/DL
MCV RBC AUTO: 89.4 FL
MONOCYTES # BLD AUTO: 0.46 K/UL
MONOCYTES NFR BLD AUTO: 7.4 %
NEUTROPHILS # BLD AUTO: 2.67 K/UL
NEUTROPHILS NFR BLD AUTO: 42.8 %
PLATELET # BLD AUTO: 223 K/UL
RBC # BLD: 3.96 M/UL
RBC # FLD: 13.3 %
WBC # FLD AUTO: 6.23 K/UL

## 2024-11-15 LAB — CYTOLOGY CVX/VAG DOC THIN PREP: NORMAL

## 2024-11-25 NOTE — H&P PST ADULT - PATIENT'S PREFERRED PRONOUN
Called and left patient a message to call me directly about testing that's scheduled.    Angie Arroyo  Lung Nurse Navigator   [facility]  [phone]  [email or web address]      Her/She

## 2025-02-13 ENCOUNTER — RX RENEWAL (OUTPATIENT)
Age: 60
End: 2025-02-13

## 2025-05-20 ENCOUNTER — RX RENEWAL (OUTPATIENT)
Age: 60
End: 2025-05-20